# Patient Record
Sex: FEMALE | Race: WHITE | NOT HISPANIC OR LATINO | Employment: UNEMPLOYED | URBAN - METROPOLITAN AREA
[De-identification: names, ages, dates, MRNs, and addresses within clinical notes are randomized per-mention and may not be internally consistent; named-entity substitution may affect disease eponyms.]

---

## 2018-03-18 ENCOUNTER — APPOINTMENT (OUTPATIENT)
Dept: GENERAL RADIOLOGY | Facility: HOSPITAL | Age: 38
End: 2018-03-18

## 2018-03-18 PROCEDURE — 72050 X-RAY EXAM NECK SPINE 4/5VWS: CPT | Performed by: EMERGENCY MEDICINE

## 2018-05-31 ENCOUNTER — APPOINTMENT (OUTPATIENT)
Dept: OTHER | Facility: HOSPITAL | Age: 38
End: 2018-05-31

## 2018-05-31 ENCOUNTER — CONSULT (OUTPATIENT)
Dept: ONCOLOGY | Facility: CLINIC | Age: 38
End: 2018-05-31

## 2018-05-31 VITALS
DIASTOLIC BLOOD PRESSURE: 67 MMHG | WEIGHT: 164.2 LBS | RESPIRATION RATE: 16 BRPM | HEIGHT: 65 IN | BODY MASS INDEX: 27.36 KG/M2 | TEMPERATURE: 98.7 F | OXYGEN SATURATION: 100 % | HEART RATE: 76 BPM | SYSTOLIC BLOOD PRESSURE: 101 MMHG

## 2018-05-31 DIAGNOSIS — I63.9 CEREBROVASCULAR ACCIDENT (CVA), UNSPECIFIED MECHANISM (HCC): ICD-10-CM

## 2018-05-31 DIAGNOSIS — R21 SKIN MACULE OR MACULAR RASH: ICD-10-CM

## 2018-05-31 DIAGNOSIS — D64.9 ANEMIA, UNSPECIFIED TYPE: Primary | ICD-10-CM

## 2018-05-31 DIAGNOSIS — H53.8 BLURRY VISION, BILATERAL: ICD-10-CM

## 2018-05-31 DIAGNOSIS — D50.9 IRON DEFICIENCY ANEMIA, UNSPECIFIED IRON DEFICIENCY ANEMIA TYPE: Primary | ICD-10-CM

## 2018-05-31 DIAGNOSIS — E53.8 B12 DEFICIENCY: ICD-10-CM

## 2018-05-31 LAB
ALBUMIN SERPL-MCNC: 4.7 G/DL (ref 3.5–5.2)
ALBUMIN/GLOB SERPL: 1.4 G/DL
ALP SERPL-CCNC: 73 U/L (ref 39–117)
ALT SERPL W P-5'-P-CCNC: 13 U/L (ref 1–33)
ANION GAP SERPL CALCULATED.3IONS-SCNC: 12.3 MMOL/L
AST SERPL-CCNC: 17 U/L (ref 1–32)
BASOPHILS # BLD AUTO: 0.05 10*3/MM3 (ref 0–0.2)
BASOPHILS NFR BLD AUTO: 0.6 % (ref 0–1.5)
BILIRUB SERPL-MCNC: 0.8 MG/DL (ref 0.1–1.2)
BUN BLD-MCNC: 8 MG/DL (ref 6–20)
BUN/CREAT SERPL: 12.7 (ref 7–25)
CALCIUM SPEC-SCNC: 9.2 MG/DL (ref 8.6–10.5)
CHLORIDE SERPL-SCNC: 101 MMOL/L (ref 98–107)
CHROMATIN AB SERPL-ACNC: <10 IU/ML (ref 0–14)
CO2 SERPL-SCNC: 25.7 MMOL/L (ref 22–29)
CREAT BLD-MCNC: 0.63 MG/DL (ref 0.57–1)
DEPRECATED RDW RBC AUTO: 40.5 FL (ref 37–54)
EOSINOPHIL # BLD AUTO: 0.19 10*3/MM3 (ref 0–0.7)
EOSINOPHIL NFR BLD AUTO: 2.1 % (ref 0.3–6.2)
ERYTHROCYTE [DISTWIDTH] IN BLOOD BY AUTOMATED COUNT: 13.2 % (ref 11.7–13)
F5 GENE MUT ANL BLD/T: NORMAL
FACTOR II, DNA ANALYSIS: NORMAL
FERRITIN SERPL-MCNC: 12.5 NG/ML (ref 13–150)
FOLATE SERPL-MCNC: >20 NG/ML (ref 4.78–24.2)
GFR SERPL CREATININE-BSD FRML MDRD: 106 ML/MIN/1.73
GLOBULIN UR ELPH-MCNC: 3.4 GM/DL
GLUCOSE BLD-MCNC: 83 MG/DL (ref 65–99)
HCT VFR BLD AUTO: 36 % (ref 35.6–45.5)
HGB BLD-MCNC: 12 G/DL (ref 11.9–15.5)
IMM GRANULOCYTES # BLD: 0.02 10*3/MM3 (ref 0–0.03)
IMM GRANULOCYTES NFR BLD: 0.2 % (ref 0–0.5)
IRON 24H UR-MRATE: 56 MCG/DL (ref 37–145)
IRON SATN MFR SERPL: 12 % (ref 20–50)
LYMPHOCYTES # BLD AUTO: 2.85 10*3/MM3 (ref 0.9–4.8)
LYMPHOCYTES NFR BLD AUTO: 31.6 % (ref 19.6–45.3)
MCH RBC QN AUTO: 27.9 PG (ref 26.9–32)
MCHC RBC AUTO-ENTMCNC: 33.3 G/DL (ref 32.4–36.3)
MCV RBC AUTO: 83.7 FL (ref 80.5–98.2)
MONOCYTES # BLD AUTO: 0.56 10*3/MM3 (ref 0.2–1.2)
MONOCYTES NFR BLD AUTO: 6.2 % (ref 5–12)
NEUTROPHILS # BLD AUTO: 5.34 10*3/MM3 (ref 1.9–8.1)
NEUTROPHILS NFR BLD AUTO: 59.3 % (ref 42.7–76)
NRBC BLD MANUAL-RTO: 0 /100 WBC (ref 0–0)
PLATELET # BLD AUTO: 293 10*3/MM3 (ref 140–500)
PMV BLD AUTO: 9.5 FL (ref 6–12)
POTASSIUM BLD-SCNC: 3.7 MMOL/L (ref 3.5–5.2)
PROT SERPL-MCNC: 8.1 G/DL (ref 6–8.5)
RBC # BLD AUTO: 4.3 10*6/MM3 (ref 3.9–5.2)
SODIUM BLD-SCNC: 139 MMOL/L (ref 136–145)
TIBC SERPL-MCNC: 463 MCG/DL (ref 298–536)
TRANSFERRIN SERPL-MCNC: 311 MG/DL (ref 200–360)
VIT B12 BLD-MCNC: 255 PG/ML (ref 211–946)
WBC NRBC COR # BLD: 9.01 10*3/MM3 (ref 4.5–10.7)

## 2018-05-31 PROCEDURE — 86235 NUCLEAR ANTIGEN ANTIBODY: CPT | Performed by: INTERNAL MEDICINE

## 2018-05-31 PROCEDURE — 82728 ASSAY OF FERRITIN: CPT | Performed by: INTERNAL MEDICINE

## 2018-05-31 PROCEDURE — 99245 OFF/OP CONSLTJ NEW/EST HI 55: CPT | Performed by: INTERNAL MEDICINE

## 2018-05-31 PROCEDURE — 85303 CLOT INHIBIT PROT C ACTIVITY: CPT | Performed by: INTERNAL MEDICINE

## 2018-05-31 PROCEDURE — 86147 CARDIOLIPIN ANTIBODY EA IG: CPT | Performed by: INTERNAL MEDICINE

## 2018-05-31 PROCEDURE — 81241 F5 GENE: CPT | Performed by: INTERNAL MEDICINE

## 2018-05-31 PROCEDURE — 86038 ANTINUCLEAR ANTIBODIES: CPT | Performed by: INTERNAL MEDICINE

## 2018-05-31 PROCEDURE — 86431 RHEUMATOID FACTOR QUANT: CPT | Performed by: INTERNAL MEDICINE

## 2018-05-31 PROCEDURE — 82607 VITAMIN B-12: CPT | Performed by: INTERNAL MEDICINE

## 2018-05-31 PROCEDURE — 36415 COLL VENOUS BLD VENIPUNCTURE: CPT

## 2018-05-31 PROCEDURE — 85025 COMPLETE CBC W/AUTO DIFF WBC: CPT | Performed by: INTERNAL MEDICINE

## 2018-05-31 PROCEDURE — 86146 BETA-2 GLYCOPROTEIN ANTIBODY: CPT | Performed by: INTERNAL MEDICINE

## 2018-05-31 PROCEDURE — 84466 ASSAY OF TRANSFERRIN: CPT | Performed by: INTERNAL MEDICINE

## 2018-05-31 PROCEDURE — 85613 RUSSELL VIPER VENOM DILUTED: CPT | Performed by: INTERNAL MEDICINE

## 2018-05-31 PROCEDURE — 85300 ANTITHROMBIN III ACTIVITY: CPT | Performed by: INTERNAL MEDICINE

## 2018-05-31 PROCEDURE — 83540 ASSAY OF IRON: CPT | Performed by: INTERNAL MEDICINE

## 2018-05-31 PROCEDURE — 81240 F2 GENE: CPT | Performed by: INTERNAL MEDICINE

## 2018-05-31 PROCEDURE — 85306 CLOT INHIBIT PROT S FREE: CPT | Performed by: INTERNAL MEDICINE

## 2018-05-31 PROCEDURE — 82746 ASSAY OF FOLIC ACID SERUM: CPT | Performed by: INTERNAL MEDICINE

## 2018-05-31 PROCEDURE — 85732 THROMBOPLASTIN TIME PARTIAL: CPT | Performed by: INTERNAL MEDICINE

## 2018-05-31 PROCEDURE — 80053 COMPREHEN METABOLIC PANEL: CPT | Performed by: INTERNAL MEDICINE

## 2018-05-31 PROCEDURE — 86148 ANTI-PHOSPHOLIPID ANTIBODY: CPT | Performed by: INTERNAL MEDICINE

## 2018-05-31 PROCEDURE — 86225 DNA ANTIBODY NATIVE: CPT | Performed by: INTERNAL MEDICINE

## 2018-06-02 LAB
CARDIOLIPIN IGG SER IA-ACNC: <9 GPL U/ML (ref 0–14)
CARDIOLIPIN IGM SER IA-ACNC: 14 MPL U/ML (ref 0–12)

## 2018-06-03 LAB
AT III PPP CHRO-ACNC: 103 % (ref 90–134)
PROT C PPP-ACNC: 117 % (ref 86–163)
PROT S ACT/NOR PPP: 81 % (ref 70–127)
PROT S FREE PPP-ACNC: 90 % (ref 49–138)

## 2018-06-04 LAB
ANA SER QL: NEGATIVE
CENTROMERE B AB SER-ACNC: <0.2 AI (ref 0–0.9)
CHROMATIN AB SERPL-ACNC: 0.2 AI (ref 0–0.9)
DSDNA AB SER-ACNC: 2 IU/ML (ref 0–9)
ENA JO1 AB SER-ACNC: <0.2 AI (ref 0–0.9)
ENA RNP AB SER-ACNC: 0.3 AI (ref 0–0.9)
ENA SCL70 AB SER-ACNC: 0.5 AI (ref 0–0.9)
ENA SM AB SER-ACNC: <0.2 AI (ref 0–0.9)
ENA SS-A AB SER-ACNC: <0.2 AI (ref 0–0.9)
ENA SS-B AB SER-ACNC: <0.2 AI (ref 0–0.9)
Lab: NORMAL

## 2018-06-04 NOTE — PROGRESS NOTES
.     REASON FOR CONSULTATION:     Provide an opinion on any further workup or treatment of anemia and possible blood clots                             REQUESTING PHYSICIAN: Marii Bravo MD     RECORDS OBTAINED:  Records of the patients history including those obtained from the referring provider were reviewed and summarized in detail.    HISTORY OF PRESENT ILLNESS:  The patient is a 38 y.o. year old female  who is here for follow-up with the above-mentioned history.    Patient presented for evaluation reported by her primary care physician Dr. Barvo because of recurrent blurry visions.  Patient also has abnormal brain MRI examination on multiple occasions.  She was origin of diagnosis of multiple sclerosis, however was told by urologist Dr. Khanna that she did not have multiple sclerosis.  Most recently patient was seen by a different neurologist Dr. Scott that her MRI images did not fit with typical MS changes.  This patient has chronic migraine headache, and was taking Topamax low-dose with good results but with side effects and the medicine was recently changed to Trokendl XR.     This patient also has history of iron deficiency for which she has been taking oral iron for many years.  She also had a vitamin B12 deficiency.  Her laboratory study on 11/20/2017 clearly reported iron deficiency with a ferritin 8 ng/mL, and that she was already mildly anemic with hemoglobin 11.3, and MCV 82.9. She had a normal WBC 6700 including neutrophils 3850 and lymphocytes 2190, monocytes 420.  Her platelets was 323,000.     Laboratory study on 3/23/2018 reported hemoglobin 11.6, MCV 83.1 MCHC 33.6.  Her WBC was 7900 including neutrophils 5240, lymphocytes 1980, monocytes 500.  Her platelets were 341,000.  Her iron studies reported free iron 106 TIBC 365 and iron saturation 29%, vitamin B12 at 242 pg/mL and vitamin D 20 ng/mL.  There was no ferritin level nor folic acid level.          Past Medical History:    Diagnosis Date   • Anemia     B12 anemia during pregnancy in    • Blurry vision, bilateral    • Cervical cancer 1965    Stage 0    • H/O HPV in female    • H/O Incomplete RBBB 2013   • H/O Raynaud phenomenon     Probable at age 31 during pregnancy   • History of prior pregnancies     x3   • History of syncope    • MS (multiple sclerosis)    • POTS (postural orthostatic tachycardia syndrome)    • Stroke     Blood clot, age 26   • SVT (supraventricular tachycardia)      Past Surgical History:   Procedure Laterality Date   • CERVICAL BIOPSY  W/ LOOP ELECTRODE EXCISION       OB/GYN history.  Menarche age 17.  .  No use of birth control pill use.  No IUD.     HEMATOLOGIC/ONCOLOGIC HISTORY:  (History from previous dates can be found in the separate document.)  See history of present illness.      MEDICATIONS    Current Outpatient Prescriptions:   •  BABY ASPIRIN PO, Take  by mouth., Disp: , Rfl:   •  CRANBERRY PO, Take  by mouth., Disp: , Rfl:   •  IRON PO, Take  by mouth., Disp: , Rfl:     ALLERGIES:     Allergies   Allergen Reactions   • Codeine Rash     vomiting       SOCIAL HISTORY:       Social History     Social History   • Marital status:      Spouse name: Bebo   • Number of children: 3   • Years of education: College      Occupational History   • , Artist       Social History Main Topics   • Smoking status: Never Smoker   • Smokeless tobacco: Never Used   • Alcohol use Yes      Comment: wine Usually in holidays and bodies, less than 1 drink every 2 weeks    • Drug use: No    • Sexual activity: Not on file         FAMILY HISTORY:  Family History   Problem Relation Age of Onset   • Cancer Mother Diagnosed of uterine cancer at age 38 post surgery.  Currently age 64 in 2018 no details.      • Other Mother, Also has chronic anemia.  Gallbladder stone.           Heart murmur   • ADD / ADHD Brother    Family history of strokes in grandmother also had Alzheimer's disease.   Gallbladder  "stone in mother, grandmother and 3 aunts.     REVIEW OF SYSTEMS:  Review of Systems   Constitutional: Negative for chills, fatigue and fever.   HENT: Positive for sore throat. Negative for congestion, facial swelling and rhinorrhea.    Eyes: Positive for visual disturbance.   Respiratory: Positive for cough and shortness of breath.    Cardiovascular: Positive for chest pain, palpitations and leg swelling.   Gastrointestinal: Negative for abdominal pain, blood in stool, diarrhea and nausea.   Endocrine: Negative for polyuria.   Genitourinary: Negative for dysuria, frequency and hematuria.   Musculoskeletal: Positive for back pain. Negative for arthralgias and joint swelling.   Allergic/Immunologic: Negative for immunocompromised state.   Neurological: Positive for dizziness, numbness and headaches. Negative for syncope and speech difficulty.   Hematological: Negative for adenopathy. Does not bruise/bleed easily.   Psychiatric/Behavioral: Negative for agitation and confusion.              Vitals:    05/31/18 1349   BP: 101/67   Pulse: 76   Resp: 16   Temp: 98.7 °F (37.1 °C)   TempSrc: Oral   SpO2: 100%   Weight: 74.5 kg (164 lb 3.2 oz)   Height: 164.5 cm (64.76\")  Comment: new ht   PainSc: 0-No pain     Current Status 5/31/2018   ECOG score 0      PHYSICAL EXAM:      CONSTITUTIONAL:  Well-developed and well-nourished young female.  No distress, looks comfortable.  EYES:  Conjunctiva and lids unremarkable.  PERRLA  EARS,NOSE,MOUTH,THROAT:  Ears and nose appear unremarkable.  Lips, teeth, gums appear unremarkable.  RESPIRATORY:  Normal respiratory effort.  Lungs clear to auscultation bilaterally.  CARDIOVASCULAR:  Normal S1, S2.  No murmurs rubs or gallops.  GASTROINTESTINAL: Abdomen appears unremarkable.  Nontender.  No hepatomegaly.  No splenomegaly.   LYMPHATIC:  No cervical, supraclavicular, axillary lymphadenopathy.  MUSCULOSKELETAL:  No significant lower extremity edema.   Unremarkable gait.  No cyanosis or " clubbing.  SKIN:  Warm.  Patient has malar rashes on her face  PSYCHIATRIC:  Normal judgment and insight.  Normal mood and affect.      RECENT LABS:    Lab Results   Component Value Date    WBC 9.01 05/31/2018    HGB 12.0 05/31/2018    HCT 36.0 05/31/2018    MCV 83.7 05/31/2018     05/31/2018     Lab Results   Component Value Date    NEUTROABS 5.34 05/31/2018           Assessment/Plan      1.  Blurry vision, bilateral, ? Cerebrovascular accident (CVA), multiple sclerosis?  This patient has multiple major complaints, and was thought related to multiple sclerosis, however she was told by 2 different neurologists that the MRI changes did not not fit with multiple sclerosis.  Instead it may be more of stroke.  This patient also has intermittent lower extremity edema although no previous evidence of venous thrombosis.  I do agree this patient needs workup for hypercoagulable status for further evaluation.      - Factor V Leiden mutation  - Factor II, DNA Analysis  - Protein C Activity  - Protein S Functional  - Anticardiolipin Antibody, IgG / M, Qn  - Lupus Anticoagulant Reflex  - Beta-2 Glycoprotein Antibodies  - Antithrombin III  - Protein S Antigen, Free  - Phosphatidylserine Antibodies  - Comprehensive Metabolic Panel      2.  Iron deficiency anemia, unspecified iron deficiency anemia type.  Patient reports she is being on oral iron treatment for several years and has been compliant.  However she has persistent iron deficiency.  I recommended checking her iron studies again.  This patient may need intravenous iron therapy as she likely has poor oral iron absorption.   - Ferritin  - Iron Profile  - Comprehensive Metabolic Panel    3.  B12 deficiency.  This patient has history of B12 deficiency and the was given B12 injection previously.  However she has not received a B12 in the past couple of years.  She was recently instructed to start taking oral vitamin B12, however she only took it irregularly.  We'll  check a B12 level together with folic acid level.  - Vitamin B12  - Folate    4.  Facial skin malar rash.  This is a very faint and the patient is not well aware of this.  She denies pruritus.  Suspect patient may have autoimmune disease.  We'll check laboratory study for evaluation.  - Antinuclear Antibodies Direct  - WANG Comprehensive Panel  - Rheumatoid Factor, Quant      PLAN:   1.  Laboratory study as mentioned above.  2.  Patient returns in 2 weeks for reevaluation.      More than 80 min was spent for patient to care, over half of that time were for counseling.    CRISTIAN YANG M.D., Ph.D.    5/31/2018      CC: Marii Bravo MD        Dictated using Dragon Dictation.

## 2018-06-05 LAB
B2 GLYCOPROT1 IGA SER-ACNC: <9 GPI IGA UNITS (ref 0–25)
B2 GLYCOPROT1 IGG SER-ACNC: <9 GPI IGG UNITS (ref 0–20)
B2 GLYCOPROT1 IGM SER-ACNC: <9 GPI IGM UNITS (ref 0–32)
PS IGA SER-ACNC: 2 APS IGA (ref 0–20)
PS IGG SER-ACNC: 2 GPS IGG (ref 0–11)
PS IGM SER-ACNC: 57 MPS IGM (ref 0–25)

## 2018-06-06 LAB
LA NT PLATELET PPP: 45.9 SEC (ref 0–51.9)
LUPUS ANTICOAGULANT REFLEX: NORMAL
SCREEN DRVVT: 45.8 SEC (ref 0–47)

## 2018-06-14 ENCOUNTER — APPOINTMENT (OUTPATIENT)
Dept: OTHER | Facility: HOSPITAL | Age: 38
End: 2018-06-14

## 2018-06-14 ENCOUNTER — OFFICE VISIT (OUTPATIENT)
Dept: ONCOLOGY | Facility: CLINIC | Age: 38
End: 2018-06-14

## 2018-06-14 VITALS
DIASTOLIC BLOOD PRESSURE: 69 MMHG | TEMPERATURE: 99.5 F | RESPIRATION RATE: 16 BRPM | SYSTOLIC BLOOD PRESSURE: 104 MMHG | HEART RATE: 90 BPM | BODY MASS INDEX: 27.19 KG/M2 | WEIGHT: 163.2 LBS | HEIGHT: 65 IN | OXYGEN SATURATION: 99 %

## 2018-06-14 DIAGNOSIS — H53.8 BLURRY VISION, BILATERAL: ICD-10-CM

## 2018-06-14 DIAGNOSIS — D68.59 HYPERCOAGULATION SYNDROME (HCC): Primary | ICD-10-CM

## 2018-06-14 DIAGNOSIS — I63.9 CEREBROVASCULAR ACCIDENT (CVA), UNSPECIFIED MECHANISM (HCC): ICD-10-CM

## 2018-06-14 DIAGNOSIS — D50.9 IRON DEFICIENCY ANEMIA, UNSPECIFIED IRON DEFICIENCY ANEMIA TYPE: ICD-10-CM

## 2018-06-14 DIAGNOSIS — E53.8 B12 DEFICIENCY: ICD-10-CM

## 2018-06-14 DIAGNOSIS — R21 SKIN MACULE OR MACULAR RASH: ICD-10-CM

## 2018-06-14 DIAGNOSIS — E53.8 B12 DEFICIENCY: Primary | ICD-10-CM

## 2018-06-14 LAB
BASOPHILS # BLD AUTO: 0.03 10*3/MM3 (ref 0–0.2)
BASOPHILS NFR BLD AUTO: 0.4 % (ref 0–1.5)
DEPRECATED RDW RBC AUTO: 39.5 FL (ref 37–54)
EOSINOPHIL # BLD AUTO: 0.15 10*3/MM3 (ref 0–0.7)
EOSINOPHIL NFR BLD AUTO: 1.9 % (ref 0.3–6.2)
ERYTHROCYTE [DISTWIDTH] IN BLOOD BY AUTOMATED COUNT: 13.2 % (ref 11.7–13)
HCT VFR BLD AUTO: 35.2 % (ref 35.6–45.5)
HGB BLD-MCNC: 11.9 G/DL (ref 11.9–15.5)
IMM GRANULOCYTES # BLD: 0.03 10*3/MM3 (ref 0–0.03)
IMM GRANULOCYTES NFR BLD: 0.4 % (ref 0–0.5)
LYMPHOCYTES # BLD AUTO: 2.42 10*3/MM3 (ref 0.9–4.8)
LYMPHOCYTES NFR BLD AUTO: 30.6 % (ref 19.6–45.3)
MCH RBC QN AUTO: 28.2 PG (ref 26.9–32)
MCHC RBC AUTO-ENTMCNC: 33.8 G/DL (ref 32.4–36.3)
MCV RBC AUTO: 83.4 FL (ref 80.5–98.2)
MONOCYTES # BLD AUTO: 0.44 10*3/MM3 (ref 0.2–1.2)
MONOCYTES NFR BLD AUTO: 5.6 % (ref 5–12)
NEUTROPHILS # BLD AUTO: 4.84 10*3/MM3 (ref 1.9–8.1)
NEUTROPHILS NFR BLD AUTO: 61.1 % (ref 42.7–76)
NRBC BLD MANUAL-RTO: 0 /100 WBC (ref 0–0)
PLATELET # BLD AUTO: 297 10*3/MM3 (ref 140–500)
PMV BLD AUTO: 9.3 FL (ref 6–12)
RBC # BLD AUTO: 4.22 10*6/MM3 (ref 3.9–5.2)
WBC NRBC COR # BLD: 7.91 10*3/MM3 (ref 4.5–10.7)

## 2018-06-14 PROCEDURE — 36415 COLL VENOUS BLD VENIPUNCTURE: CPT

## 2018-06-14 PROCEDURE — 99215 OFFICE O/P EST HI 40 MIN: CPT | Performed by: INTERNAL MEDICINE

## 2018-06-14 PROCEDURE — 85025 COMPLETE CBC W/AUTO DIFF WBC: CPT | Performed by: INTERNAL MEDICINE

## 2018-06-14 NOTE — PROGRESS NOTES
.     REASON FOR CONSULTATION:     1.  Provide an opinion on any further workup or treatment of anemia and possible blood clots with suspected episodes of strokes.   2.  Laboratory study on 5/31/2018 reported significantly elevated anti-phosphatidylserine IgM 57, and marginally elevated anticardiolipin IgM 14.  All others were negative.   3.  Laboratory tests confirmed vitamin B12 deficiency and iron deficiency on 5/31/2018.                                   HISTORY OF PRESENT ILLNESS:  The patient is a 38 y.o. year old female who is here for follow-up with the above-mentioned history.    The patient presented today for reevaluation and to discuss laboratory results obtained on 05/31/2018 when I saw her for the 1st time for evaluation of possible thrombosis as well as persistent anemia.  The patient reports no changes in her clinical condition the past 6 days.  No recent episodes of blurry vision.  She is on low-dose aspirin 81 mg daily.  The patient also has been on low-dose oral iron 28 mg daily with good compliance.  She was not able to tolerate the large dose of ferrous sulfate at 65 mg tablets because of significant constipation.  The patient is not really compliant with oral vitamin B12 supplementation.    Laboratory studies we obtained on 05/31/2018 showed significantly elevated antiphosphatidylserine antibody IgM subtype at 57, but negative for IgG and IgA subtype.  She is also marginally positive for anticardiolipin IgM at 14, but negative for IgG subtype.  She was tested negative for other hypercoagulable tests including anti-Beta-2 glycoprotein 1 antibodies, negative for lupus anticoagulant and normal antithrombin 103%, normal protein C activity 117%, normal protein S activity 81% with free protein S antigen 90%.  She is also negative for factor II mutation and factor V Leiden mutation.  We also checked her for comprehensive rheumatoid panel which is all negative as well as negative for the rheumatoid  factor and direct WANG.  She also had iron deficiency, ferritin 12.5, iron saturation 12% 2018.         Past Medical History:   Diagnosis Date   • Anemia     B12 anemia during pregnancy in    • Blurry vision, bilateral    • Cervical cancer 1965    Stage 0    • H/O HPV in female    • H/O Incomplete RBBB 2013   • H/O Raynaud phenomenon     Probable at age 31 during pregnancy   • History of prior pregnancies     x3   • History of syncope    • MS (multiple sclerosis)    • POTS (postural orthostatic tachycardia syndrome)    • Stroke     Blood clot, age 26   • SVT (supraventricular tachycardia)    • Tattoos      Past Surgical History:   Procedure Laterality Date   • CERVICAL BIOPSY  W/ LOOP ELECTRODE EXCISION       OB/GYN history.  Menarche age 17.  .  No use of birth control pill use.  No IUD.     HEMATOLOGIC/ONCOLOGIC HISTORY:  The patient is a 38 y.o. year old female who is here for initial evaluation with the above-mentioned history on 2018.    Patient presented for evaluation reported by her primary care physician Dr. Bravo because of recurrent episodes of blurry visions.  Patient also has abnormal brain MRI examination on multiple occasions.  She was origin of diagnosis of multiple sclerosis, however was told by urologist Dr. Khanna that she did not have multiple sclerosis.  Most recently patient was seen by a different neurologist Dr. Scott that her MRI images did not fit with typical MS changes, instead it may be changes related to thrombotic stroke.  This patient has chronic migraine headache, and was taking Topamax low-dose with good results but with side effects and the medicine was recently changed to Trokendl XR.     This patient also has history of iron deficiency for which she has been taking oral iron for many years.  She also had vitamin B12 deficiency.  Her laboratory study on 2017 clearly reported iron deficiency with ferritin 8 ng/mL, and she was already mildly anemic  with hemoglobin 11.3, and MCV 82.9. She had a normal WBC 6700 including neutrophils 3850 and lymphocytes 2190, monocytes 420.  Her platelets was 323,000.     Laboratory study on 3/23/2018 reported hemoglobin 11.6, MCV 83.1 MCHC 33.6.  Her WBC was 7900 including neutrophils 5240, lymphocytes 1980, monocytes 500.  Her platelets were 341,000.  Her iron studies reported free iron 106 TIBC 365 and iron saturation 29%, vitamin B12 at 242 pg/mL and vitamin D 20 ng/mL.  There was no ferritin level nor folic acid level.          MEDICATIONS    Current Outpatient Prescriptions:   •  BABY ASPIRIN PO, Take  by mouth., Disp: , Rfl:   •  CRANBERRY PO, Take  by mouth., Disp: , Rfl:   •  IRON PO, Take  by mouth., Disp: , Rfl:     ALLERGIES:     Allergies   Allergen Reactions   • Codeine Rash     vomiting       SOCIAL HISTORY:       Social History     Social History   • Marital status:      Spouse name: Bebo   • Number of children: 3   • Years of education: College      Occupational History   • , Artist       Social History Main Topics   • Smoking status: Never Smoker   • Smokeless tobacco: Never Used   • Alcohol use Yes      Comment: wine Usually in holidays and bodies, less than 1 drink every 2 weeks    • Drug use: No    • Sexual activity: Not on file         FAMILY HISTORY:  Family History   Problem Relation Age of Onset   • Cancer Mother Diagnosed of uterine cancer at age 38 post surgery.  Currently age 64 in 2018 no details.      • Other Mother, Also has chronic anemia.  Gallbladder stone.           Heart murmur   • ADD / ADHD Brother    Family history of strokes in grandmother also had Alzheimer's disease.   Gallbladder stone in mother, grandmother and 3 aunts.     REVIEW OF SYSTEMS:  Review of Systems   Constitutional: Negative for chills, fatigue and fever.   HENT: Positive for sore throat. Negative for congestion, facial swelling and rhinorrhea.    Eyes: Positive for visual disturbance.   Respiratory:  "Positive for cough and shortness of breath.    Cardiovascular: Positive for chest pain, palpitations and leg swelling.   Gastrointestinal: Negative for abdominal pain, blood in stool, diarrhea and nausea.   Endocrine: Negative for polyuria.   Genitourinary: Negative for dysuria, frequency and hematuria.   Musculoskeletal: Positive for back pain. Negative for arthralgias and joint swelling.   Allergic/Immunologic: Negative for immunocompromised state.   Neurological: Positive for dizziness, numbness and headaches. Negative for syncope and speech difficulty.   Hematological: Negative for adenopathy. Does not bruise/bleed easily.   Psychiatric/Behavioral: Negative for agitation and confusion.              Vitals:    06/14/18 0930   BP: 104/69   Pulse: 90   Resp: 16   Temp: 99.5 °F (37.5 °C)   TempSrc: Oral   SpO2: 99%   Weight: 74 kg (163 lb 3.2 oz)   Height: 164.5 cm (64.76\")   PainSc: 0-No pain     Current Status 6/14/2018   ECOG score 0      PHYSICAL EXAM:      CONSTITUTIONAL:  Well-developed and well-nourished young female.  No distress, looks comfortable.  EYES:  Conjunctiva and lids unremarkable.  PERRLA  EARS,NOSE,MOUTH,THROAT:  Ears and nose appear unremarkable.  Lips, teeth, gums appear unremarkable.  RESPIRATORY:  Normal respiratory effort.  Lungs clear to auscultation bilaterally.  CARDIOVASCULAR:  Normal S1, S2.  No murmurs rubs or gallops.  GASTROINTESTINAL: Abdomen appears unremarkable.  Nontender.  No hepatomegaly.  No splenomegaly.   LYMPHATIC:  No cervical, supraclavicular, axillary lymphadenopathy.  MUSCULOSKELETAL:  No significant lower extremity edema.   Unremarkable gait.  No cyanosis or clubbing.  SKIN:  Warm.  Patient has malar rashes on her face  PSYCHIATRIC:  Normal judgment and insight.  Normal mood and affect.      RECENT LABS:    Lab Results   Component Value Date    WBC 7.91 06/14/2018    HGB 11.9 06/14/2018    HCT 35.2 (L) 06/14/2018    MCV 83.4 06/14/2018     06/14/2018     Lab " Results   Component Value Date    NEUTROABS 4.84 06/14/2018     Lab Results   Component Value Date    WCIZYBTM79 255 05/31/2018     Lab Results   Component Value Date    FOLATE >20.00 05/31/2018     Lab Results   Component Value Date    IRON 56 05/31/2018    TIBC 463 05/31/2018    FERRITIN 12.50 (L) 05/31/2018   Iron saturation 12%      Assessment/Plan      1.  Blurry vision, bilateral, ? Cerebrovascular accident (CVA), multiple sclerosis?  This patient has multiple major complaints, and was thought related to multiple sclerosis, however she was told by 2 different neurologists that the MRI changes did not not fit with multiple sclerosis.  Instead it may be more of stroke.  This patient also has intermittent lower extremity edema although no previous evidence of venous thrombosis.     I discussed with the patient, with her clinical symptomology with multiple episodes of blurred vision and MRI examination showed possible effect from previous strokes together with abnormal laboratory tests with significantly elevated antiphosphatidylserine antibody IGM, she is highly suspicious for antiphospholipid syndrome.  I did point out that this abnormality needs to be retested in 3 months per recommendation, only if she is persistently positive for the antibody, she will meet criteria for diagnosis.  Nevertheless, based on her symptomology I would start her on anticoagulation.  I discussed with her the options and I recommended starting Eliquis 5 mg twice a day without a loading dose since she does not have acute episodes of stroke.  I did point out that if in the future she becomes pregnant, she would notify us as soon as possible and we will switch her to Lovenox since Eliquis and other new generation oral anticoagulant medication has no safety records in pregnant women.  She voiced understanding.      2.  Iron deficiency anemia.      This patient has been on low-dose oral iron once a day for a few years with good compliance.   She could not tolerate high-dose ferrous sulfate because of significant constipation.  She does have persistent iron deficiency and clearly shown by laboratory study on 05/31/2018 with ferritin 12.5, iron saturation 12%.  I asked her to increase the oral iron to 3 times a day.  She could use a stool softener with Senokot 2 tablets twice a day and on top of that could still use MiraLAX or other such as fiber and she voiced understanding.  If she is not able to tolerate the increased dose of oral iron treatment, we will arrange for the patient to have intravenous iron therapy.  I did point out there are small incidents of allergic reaction associated with intravenous iron therapy.     , unspecified iron deficiency anemia type.  Patient reports she is being on oral iron treatment for several years and has been compliant.  However she has persistent iron deficiency.  I recommended checking her iron studies again.  This patient may need intravenous iron therapy as she likely has poor oral iron absorption.   - Ferritin  - Iron Profile  - Comprehensive Metabolic Panel    3.  B12 deficiency.  This patient has history of B12 deficiency and the was given B12 injection previously.  However she has not received a B12 in the past couple of years.     This patient has persistent vitamin B12 deficiency and she is not taking B12 daily.  I discussed with her that we will try avoiding using vitamin B12 intramuscular injection because of the risk of intramuscular hematoma as we will start her on anticoagulation medicine.  I would prefer she can be persistent in taking oral vitamin B12 daily at 1000 mcg and we will monitor her labs.  Unless she is not responding to oral vitamin B12 then we can potentially switch her to subcutaneous B12 injection, which is not the best way to give her vitamin B12.  The patient is agreeable to taking vitamin B12 more religiously.      4.  Facial skin malar rash.  This is very faint facial skin rash.   She denies pruritus.  She has completely negative laboratory studies for Lupus disease.  We'll monitor this.  If she has worsening skin rash,  She will need dermatology evaluation.    PLAN:      1.  Start Eliquis 5 mg twice a day.  She could continue to take low-dose aspirin.  She needs to be cautious for injury or bleeding.   2.  Increase oral iron to 3 times a day.  Use stool softener judiciously.  3.  Take vitamin B12 oral 1000 mcg daily.   4.  I will bring the patient back for reevaluation in 3 months with laboratory study 1 week earlier including repeating antiphosphatidylserine antibodies, anticardiolipin antibodies, ferritin, iron profile, vitamin B12 level and CBC.      More than 45 min was spent for patient to care, over half of that time were for counseling.    CRISTIAN YANG M.D., Ph.D.    6/14/2018      CC: Marii Bravo MD        Dictated using Dragon Dictation.

## 2018-08-15 ENCOUNTER — APPOINTMENT (OUTPATIENT)
Dept: CT IMAGING | Facility: HOSPITAL | Age: 38
End: 2018-08-15

## 2018-08-15 ENCOUNTER — HOSPITAL ENCOUNTER (EMERGENCY)
Facility: HOSPITAL | Age: 38
Discharge: HOME | End: 2018-08-15
Attending: EMERGENCY MEDICINE | Admitting: EMERGENCY MEDICINE

## 2018-08-15 VITALS
BODY MASS INDEX: 27.31 KG/M2 | WEIGHT: 160 LBS | RESPIRATION RATE: 16 BRPM | OXYGEN SATURATION: 98 % | DIASTOLIC BLOOD PRESSURE: 60 MMHG | HEIGHT: 64 IN | TEMPERATURE: 98.2 F | SYSTOLIC BLOOD PRESSURE: 100 MMHG | HEART RATE: 72 BPM

## 2018-08-15 DIAGNOSIS — R51.9 NONINTRACTABLE HEADACHE, UNSPECIFIED CHRONICITY PATTERN, UNSPECIFIED HEADACHE TYPE: Primary | ICD-10-CM

## 2018-08-15 PROCEDURE — 99284 EMERGENCY DEPT VISIT MOD MDM: CPT

## 2018-08-15 PROCEDURE — 25010000002 ONDANSETRON PER 1 MG

## 2018-08-15 PROCEDURE — 99282 EMERGENCY DEPT VISIT SF MDM: CPT | Performed by: EMERGENCY MEDICINE

## 2018-08-15 PROCEDURE — 25010000002 METHYLPREDNISOLONE PER 40 MG: Performed by: EMERGENCY MEDICINE

## 2018-08-15 PROCEDURE — 70450 CT HEAD/BRAIN W/O DYE: CPT

## 2018-08-15 PROCEDURE — 96374 THER/PROPH/DIAG INJ IV PUSH: CPT

## 2018-08-15 PROCEDURE — 96375 TX/PRO/DX INJ NEW DRUG ADDON: CPT

## 2018-08-15 RX ORDER — ONDANSETRON 2 MG/ML
INJECTION INTRAMUSCULAR; INTRAVENOUS
Status: COMPLETED
Start: 2018-08-15 | End: 2018-08-15

## 2018-08-15 RX ORDER — ACETAMINOPHEN 500 MG
1000 TABLET ORAL ONCE
Status: COMPLETED | OUTPATIENT
Start: 2018-08-15 | End: 2018-08-15

## 2018-08-15 RX ORDER — METHYLPREDNISOLONE SODIUM SUCCINATE 40 MG/ML
80 INJECTION, POWDER, LYOPHILIZED, FOR SOLUTION INTRAMUSCULAR; INTRAVENOUS ONCE
Status: COMPLETED | OUTPATIENT
Start: 2018-08-15 | End: 2018-08-15

## 2018-08-15 RX ORDER — ONDANSETRON 2 MG/ML
4 INJECTION INTRAMUSCULAR; INTRAVENOUS ONCE
Status: COMPLETED | OUTPATIENT
Start: 2018-08-15 | End: 2018-08-15

## 2018-08-15 RX ORDER — SODIUM CHLORIDE 0.9 % (FLUSH) 0.9 %
10 SYRINGE (ML) INJECTION AS NEEDED
Status: DISCONTINUED | OUTPATIENT
Start: 2018-08-15 | End: 2018-08-15 | Stop reason: HOSPADM

## 2018-08-15 RX ADMIN — METHYLPREDNISOLONE SODIUM SUCCINATE 80 MG: 40 INJECTION, POWDER, FOR SOLUTION INTRAMUSCULAR; INTRAVENOUS at 04:04

## 2018-08-15 RX ADMIN — ONDANSETRON 4 MG: 2 INJECTION INTRAMUSCULAR; INTRAVENOUS at 04:04

## 2018-08-15 RX ADMIN — ACETAMINOPHEN 1000 MG: 500 TABLET, FILM COATED ORAL at 04:06

## 2018-08-15 NOTE — ED PROVIDER NOTES
Subjective   History of Present Illness  History of Present Illness    Chief complaint: Head pressure and blurry vision    Location: Crown of head    Quality/Severity:  Moderate    Timing/Duration: Sudden onset  approximately 8 hours prior to arrival    Modifying Factors: Supine position increases discomfort    Associated Symptoms: Neck stiffness      Narrative: The patient is a 38-year-old white female who presents as noted above.  The patient states that she was at rest when she had the sudden onset of severe head pressure associated with some nausea, neck stiffness and blurred vision.  The patient relates that she had a similar episode in March of this year with no specific diagnosis given although the record indicates the episode was possibly due to multiple sclerosis or possibly an ischemic event.    Review of Systems   Constitutional: Negative for activity change, appetite change, fatigue and fever.   HENT: Negative for congestion.    Eyes: Positive for visual disturbance.   Respiratory: Negative for cough, shortness of breath and wheezing.    Cardiovascular: Negative for chest pain, palpitations and leg swelling.   Gastrointestinal: Positive for nausea. Negative for abdominal pain, diarrhea and vomiting.   Endocrine: Negative for polydipsia.   Genitourinary: Negative for difficulty urinating, dysuria, flank pain, frequency and urgency.   Musculoskeletal: Positive for neck stiffness. Negative for back pain.   Skin: Negative for rash.   Neurological: Positive for headaches. Negative for dizziness and weakness.   Psychiatric/Behavioral: Negative for confusion.   All other systems reviewed and are negative.      Past Medical History:   Diagnosis Date   • Anemia     B12 anemia during pregnancy in 2014   • Antiphospholipid syndrome (CMS/HCC)    • Blurry vision, bilateral    • Cervical cancer (CMS/HCC) 1965    Stage 0    • H/O HPV in female    • H/O Incomplete RBBB 09/2013   • H/O Raynaud phenomenon     Probable at  age 31 during pregnancy   • History of prior pregnancies     x3   • History of syncope    • MS (multiple sclerosis) (CMS/HCC)    • POTS (postural orthostatic tachycardia syndrome) 2014   • Stroke (CMS/HCC)     Blood clot, age 26   • SVT (supraventricular tachycardia) (CMS/HCC)    • Tattoos        Allergies   Allergen Reactions   • Codeine Rash     vomiting       Past Surgical History:   Procedure Laterality Date   • CERVICAL BIOPSY  W/ LOOP ELECTRODE EXCISION         Family History   Problem Relation Age of Onset   • Other Mother         Heart murmur   • Uterine cancer Mother 38   • ADD / ADHD Brother    • Stroke Other    • Alzheimer's disease Other        Social History     Social History   • Marital status:      Spouse name: Bebo   • Number of children: 3   • Years of education: College     Occupational History   •       Social History Main Topics   • Smoking status: Never Smoker   • Smokeless tobacco: Never Used   • Alcohol use Yes      Comment: wine   • Drug use: No     Other Topics Concern   • Not on file           Objective   Physical Exam   Constitutional: She is oriented to person, place, and time. She appears well-developed and well-nourished.   HENT:   Head: Normocephalic and atraumatic.   Eyes: Pupils are equal, round, and reactive to light. Conjunctivae and EOM are normal.   Neck: No thyromegaly present.   Patient expresses discomfort with flexion of neck.   Cardiovascular: Normal rate, regular rhythm and normal heart sounds.    No murmur heard.  Pulmonary/Chest: Effort normal and breath sounds normal. No respiratory distress. She has no wheezes. She has no rales.   Abdominal: Soft. Bowel sounds are normal. She exhibits no distension. There is no tenderness.   Musculoskeletal: Normal range of motion. She exhibits no edema or tenderness.   Lymphadenopathy:     She has no cervical adenopathy.   Neurological: She is alert and oriented to person, place, and time. No cranial nerve deficit.    Skin: Skin is warm and dry. No rash noted.   Psychiatric: She has a normal mood and affect. Her behavior is normal. Thought content normal.   Nursing note and vitals reviewed.      Procedures           ED Course  ED Course as of Aug 15 0518   Wed Aug 15, 2018   0406 A thorough review of the patient's medical record was undertaken.  The patient did have CTA and MRI of the brain in April of this year.  Although the scans were not completely normal they did not definitively show an exact diagnosis.  The patient was recently seen by oncology and started on eliquis for possible antiphospholipid syndrome.  Due to the fact that the patient is anticoagulated and is complaining of some neck stiffness along with her head pressure there is a possibility that she may have suffered a subarachnoid hemorrhage.  [ML]   0505 Radiology wet read on the head CT showed no acute findings.  [ML]   0514 Negative CT results explained to patient.  She was reassured that there was no evidence of acute bleeding are a major ischemic infarct at this time.  Patient stated that she did feel slightly better after treatment in the emergency department.  Patient was strongly advised to follow-up with her primary care provider and warnings were discussed.  [ML]      ED Course User Index  [ML] Luis Tomas MD                  MDM  Number of Diagnoses or Management Options  Nonintractable headache, unspecified chronicity pattern, unspecified headache type: new and requires workup     Amount and/or Complexity of Data Reviewed  Tests in the radiology section of CPT®: ordered and reviewed  Review and summarize past medical records: yes  Independent visualization of images, tracings, or specimens: yes    Risk of Complications, Morbidity, and/or Mortality  Presenting problems: high  Diagnostic procedures: high  Management options: moderate    Patient Progress  Patient progress: stable        Final diagnoses:   Nonintractable headache, unspecified  chronicity pattern, unspecified headache type            Luis Tomas MD  08/15/18 0522

## 2018-08-31 ENCOUNTER — APPOINTMENT (OUTPATIENT)
Dept: OTHER | Facility: HOSPITAL | Age: 38
End: 2018-08-31

## 2018-09-04 ENCOUNTER — TELEPHONE (OUTPATIENT)
Dept: ONCOLOGY | Facility: CLINIC | Age: 38
End: 2018-09-04

## 2018-09-06 ENCOUNTER — APPOINTMENT (OUTPATIENT)
Dept: OTHER | Facility: HOSPITAL | Age: 38
End: 2018-09-06

## 2018-09-06 ENCOUNTER — APPOINTMENT (OUTPATIENT)
Dept: ONCOLOGY | Facility: CLINIC | Age: 38
End: 2018-09-06

## 2018-09-13 ENCOUNTER — LAB (OUTPATIENT)
Dept: OTHER | Facility: HOSPITAL | Age: 38
End: 2018-09-13

## 2018-09-13 DIAGNOSIS — D50.9 IRON DEFICIENCY ANEMIA, UNSPECIFIED IRON DEFICIENCY ANEMIA TYPE: ICD-10-CM

## 2018-09-13 DIAGNOSIS — D68.59 HYPERCOAGULATION SYNDROME (HCC): ICD-10-CM

## 2018-09-13 DIAGNOSIS — E53.8 B12 DEFICIENCY: ICD-10-CM

## 2018-09-13 LAB
BASOPHILS # BLD AUTO: 0.03 10*3/MM3 (ref 0–0.2)
BASOPHILS NFR BLD AUTO: 0.5 % (ref 0–1.5)
DEPRECATED RDW RBC AUTO: 39.5 FL (ref 37–54)
EOSINOPHIL # BLD AUTO: 0.13 10*3/MM3 (ref 0–0.7)
EOSINOPHIL NFR BLD AUTO: 2 % (ref 0.3–6.2)
ERYTHROCYTE [DISTWIDTH] IN BLOOD BY AUTOMATED COUNT: 13.1 % (ref 11.7–13)
FERRITIN SERPL-MCNC: 8 NG/ML (ref 13–150)
HCT VFR BLD AUTO: 35 % (ref 35.6–45.5)
HGB BLD-MCNC: 11.2 G/DL (ref 11.9–15.5)
IMM GRANULOCYTES # BLD: 0.03 10*3/MM3 (ref 0–0.03)
IMM GRANULOCYTES NFR BLD: 0.5 % (ref 0–0.5)
IRON 24H UR-MRATE: 29 MCG/DL (ref 37–145)
IRON SATN MFR SERPL: 7 % (ref 20–50)
LYMPHOCYTES # BLD AUTO: 1.94 10*3/MM3 (ref 0.9–4.8)
LYMPHOCYTES NFR BLD AUTO: 29.9 % (ref 19.6–45.3)
MCH RBC QN AUTO: 27.4 PG (ref 26.9–32)
MCHC RBC AUTO-ENTMCNC: 32 G/DL (ref 32.4–36.3)
MCV RBC AUTO: 85.6 FL (ref 80.5–98.2)
MONOCYTES # BLD AUTO: 0.4 10*3/MM3 (ref 0.2–1.2)
MONOCYTES NFR BLD AUTO: 6.2 % (ref 5–12)
NEUTROPHILS # BLD AUTO: 3.96 10*3/MM3 (ref 1.9–8.1)
NEUTROPHILS NFR BLD AUTO: 60.9 % (ref 42.7–76)
NRBC BLD MANUAL-RTO: 0 /100 WBC (ref 0–0)
PLATELET # BLD AUTO: 292 10*3/MM3 (ref 140–500)
PMV BLD AUTO: 9.2 FL (ref 6–12)
RBC # BLD AUTO: 4.09 10*6/MM3 (ref 3.9–5.2)
TIBC SERPL-MCNC: 414 MCG/DL (ref 298–536)
TRANSFERRIN SERPL-MCNC: 278 MG/DL (ref 200–360)
VIT B12 BLD-MCNC: 236 PG/ML (ref 211–946)
WBC NRBC COR # BLD: 6.49 10*3/MM3 (ref 4.5–10.7)

## 2018-09-13 PROCEDURE — 86147 CARDIOLIPIN ANTIBODY EA IG: CPT | Performed by: INTERNAL MEDICINE

## 2018-09-13 PROCEDURE — 85025 COMPLETE CBC W/AUTO DIFF WBC: CPT | Performed by: INTERNAL MEDICINE

## 2018-09-13 PROCEDURE — 82728 ASSAY OF FERRITIN: CPT | Performed by: INTERNAL MEDICINE

## 2018-09-13 PROCEDURE — 83540 ASSAY OF IRON: CPT | Performed by: INTERNAL MEDICINE

## 2018-09-13 PROCEDURE — 86148 ANTI-PHOSPHOLIPID ANTIBODY: CPT | Performed by: INTERNAL MEDICINE

## 2018-09-13 PROCEDURE — 36415 COLL VENOUS BLD VENIPUNCTURE: CPT

## 2018-09-13 PROCEDURE — 84466 ASSAY OF TRANSFERRIN: CPT | Performed by: INTERNAL MEDICINE

## 2018-09-13 PROCEDURE — 82607 VITAMIN B-12: CPT | Performed by: INTERNAL MEDICINE

## 2018-09-15 LAB
CARDIOLIPIN IGG SER IA-ACNC: <9 GPL U/ML (ref 0–14)
CARDIOLIPIN IGM SER IA-ACNC: 9 MPL U/ML (ref 0–12)

## 2018-09-19 LAB
PS IGA SER-ACNC: 2 APS IGA (ref 0–20)
PS IGG SER-ACNC: 2 GPS IGG (ref 0–11)
PS IGM SER-ACNC: 65 MPS IGM (ref 0–25)

## 2018-09-20 ENCOUNTER — OFFICE VISIT (OUTPATIENT)
Dept: ONCOLOGY | Facility: CLINIC | Age: 38
End: 2018-09-20

## 2018-09-20 ENCOUNTER — APPOINTMENT (OUTPATIENT)
Dept: OTHER | Facility: HOSPITAL | Age: 38
End: 2018-09-20

## 2018-09-20 VITALS
HEART RATE: 80 BPM | OXYGEN SATURATION: 100 % | SYSTOLIC BLOOD PRESSURE: 99 MMHG | DIASTOLIC BLOOD PRESSURE: 69 MMHG | RESPIRATION RATE: 16 BRPM | WEIGHT: 158 LBS | TEMPERATURE: 98.2 F | BODY MASS INDEX: 26.33 KG/M2 | HEIGHT: 65 IN

## 2018-09-20 DIAGNOSIS — D50.9 IRON DEFICIENCY ANEMIA, UNSPECIFIED IRON DEFICIENCY ANEMIA TYPE: Primary | ICD-10-CM

## 2018-09-20 DIAGNOSIS — E53.8 B12 DEFICIENCY: ICD-10-CM

## 2018-09-20 DIAGNOSIS — IMO0001 ADVERSE EFFECT OF IRON OR ITS COMPOUND, INITIAL ENCOUNTER: ICD-10-CM

## 2018-09-20 DIAGNOSIS — R21 SKIN MACULE OR MACULAR RASH: ICD-10-CM

## 2018-09-20 PROCEDURE — 99215 OFFICE O/P EST HI 40 MIN: CPT | Performed by: INTERNAL MEDICINE

## 2018-09-20 PROCEDURE — G0463 HOSPITAL OUTPT CLINIC VISIT: HCPCS | Performed by: INTERNAL MEDICINE

## 2018-09-20 RX ORDER — METHYLPREDNISOLONE SODIUM SUCCINATE 125 MG/2ML
125 INJECTION, POWDER, LYOPHILIZED, FOR SOLUTION INTRAMUSCULAR; INTRAVENOUS AS NEEDED
Status: CANCELLED | OUTPATIENT
Start: 2018-09-27

## 2018-09-20 RX ORDER — SODIUM CHLORIDE 9 MG/ML
250 INJECTION, SOLUTION INTRAVENOUS ONCE
Status: CANCELLED | OUTPATIENT
Start: 2018-09-27 | End: 2018-09-27

## 2018-09-20 RX ORDER — DIPHENHYDRAMINE HYDROCHLORIDE 50 MG/ML
50 INJECTION INTRAMUSCULAR; INTRAVENOUS AS NEEDED
Status: CANCELLED | OUTPATIENT
Start: 2018-09-27

## 2018-09-20 NOTE — PROGRESS NOTES
.     REASON FOR CONSULTATION:     1.  Provide an opinion on any further workup or treatment of anemia and possible blood clots with suspected episodes of strokes.   2.  Laboratory study on 5/31/2018 reported significantly elevated anti-phosphatidylserine IgM at 57, and marginally elevated anticardiolipin IgM 14.  All others were negative.   3.  Laboratory tests confirmed vitamin B12 deficiency and iron deficiency on 5/31/2018.    4.  Repeated laboratory study on 9/13/2018 showed persistently significantly elevated anti-phosphatidylserine IgM at 65.  She also has persistent significant iron deficiency and vitamin B12 deficiency.                                 HISTORY OF PRESENT ILLNESS:  The patient is a 38 y.o. year old female who is here for follow-up with the above-mentioned history, and to discuss laboratory results obtained a week ago.  Patient is accompanied by her  who helped with the history also.    Patient reports she has no recurrence of blurry vision, no headaches and dizziness since she started on Eliquis 5 mg twice a day in middle June 2018.  She feels more fit.  Her  also reports patient is more energetic in the past 3 months.  Patient reports no easy bleeding or bruising.  She does note somewhat increased volume of her menses.    Patient reports she was not able to tolerate even low-dose oral iron with significant constipation.  She also reports vitamin B12 causes palpitation.  So currently she is not taking oral iron or vitamin B-12.      Laboratory results on 9/13/2018 reported persistently elevated anti-phosphatidylserine at 65, however resolution of mildly elevated anticardiolipin IgM antibody.  She has persistent iron deficiency with a ferritin 8 ng/mL, iron saturation 7%, both of which actually worse than those levels on 5/31/2018.  Her B12 level also is low at 236 pg/mL.  She has worsening anemia with hemoglobin 11.2 but maintains normal WBC 6500 and platelets 292,000.      Past Medical History:   Diagnosis Date   • Anemia     B12 anemia during pregnancy in    • Antiphospholipid syndrome (CMS/HCC)    • Blurry vision, bilateral    • Cervical cancer (CMS/HCC) 1965    Stage 0    • H/O HPV in female    • H/O Incomplete RBBB 2013   • H/O Raynaud phenomenon     Probable at age 31 during pregnancy   • History of prior pregnancies     x3   • History of syncope    • MS (multiple sclerosis) (CMS/HCC)    • POTS (postural orthostatic tachycardia syndrome)    • Stroke (CMS/HCC)     Blood clot, age 26   • SVT (supraventricular tachycardia) (CMS/HCC)    • Tattoos      Past Surgical History:   Procedure Laterality Date   • CERVICAL BIOPSY  W/ LOOP ELECTRODE EXCISION       OB/GYN history.  Menarche age 17.  .  No use of birth control pill use.  No IUD.     HEMATOLOGIC/ONCOLOGIC HISTORY:  The patient is a 38 y.o. year old female who is here for initial evaluation with the above-mentioned history on 2018.    Patient presented for evaluation reported by her primary care physician Dr. Bravo because of recurrent episodes of blurry visions.  Patient also has abnormal brain MRI examination on multiple occasions.  She was origin of diagnosis of multiple sclerosis, however was told by urologist Dr. Khanna that she did not have multiple sclerosis.  Most recently patient was seen by a different neurologist Dr. Scott that her MRI images did not fit with typical MS changes, instead it may be changes related to thrombotic stroke.  This patient has chronic migraine headache, and was taking Topamax low-dose with good results but with side effects and the medicine was recently changed to Trokendl XR.     This patient also has history of iron deficiency for which she has been taking oral iron for many years.  She also had vitamin B12 deficiency.  Her laboratory study on 2017 clearly reported iron deficiency with ferritin 8 ng/mL, and she was already mildly anemic with hemoglobin 11.3,  and MCV 82.9. She had a normal WBC 6700 including neutrophils 3850 and lymphocytes 2190, monocytes 420.  Her platelets was 323,000.     Laboratory study on 3/23/2018 reported hemoglobin 11.6, MCV 83.1 MCHC 33.6.  Her WBC was 7900 including neutrophils 5240, lymphocytes 1980, monocytes 500.  Her platelets were 341,000.  Her iron studies reported free iron 106 TIBC 365 and iron saturation 29%, vitamin B12 at 242 pg/mL and vitamin D 20 ng/mL.  There was no ferritin level nor folic acid level.      Laboratory studies we obtained on 05/31/2018 showed significantly elevated antiphosphatidylserine antibody IgM subtype at 57, but negative for IgG and IgA subtype.  She is also marginally positive for anticardiolipin IgM at 14, but negative for IgG subtype.  She was tested negative for other hypercoagulable tests including anti-Beta-2 glycoprotein 1 antibodies, negative for lupus anticoagulant and normal antithrombin 103%, normal protein C activity 117%, normal protein S activity 81% with free protein S antigen 90%.  She is also negative for factor II mutation and factor V Leiden mutation.  We also checked her for comprehensive rheumatoid panel which is all negative as well as negative for the rheumatoid factor and direct WANG.  She also had iron deficiency, ferritin 12.5, iron saturation 12% 05/31/2018.     The patient presented on 6/14/2018 for reevaluation and to discuss laboratory results obtained on 05/31/2018 when I saw her for the 1st time for evaluation of possible thrombosis as well as persistent anemia.  The patient reports no changes in her clinical condition the past 6 days.  No recent episodes of blurry vision.  She is on low-dose aspirin 81 mg daily.  The patient also has been on low-dose oral iron 28 mg daily with good compliance.  She was not able to tolerate the large dose of ferrous sulfate at 65 mg tablets because of significant constipation.  The patient is not really compliant with oral vitamin B12  supplementation.    We started patient on Eliquis 5 mg twice a day on 6/14/2018.  This patient is likely having antiphospholipid antibody syndrome.    MEDICATIONS    Current Outpatient Prescriptions:   •  apixaban (ELIQUIS) 5 MG tablet tablet, Take 1 tablet by mouth Every 12 (Twelve) Hours. (Patient taking differently: Take 2.5 mg by mouth 2 (Two) Times a Day. Takes 2.5 in the am, and 2.5 mg at night.), Disp: 60 tablet, Rfl: 3    ALLERGIES:     Allergies   Allergen Reactions   • Codeine Rash     vomiting       SOCIAL HISTORY:       Social History     Social History   • Marital status:      Spouse name: Bebo   • Number of children: 3   • Years of education: College      Occupational History   • , Artist       Social History Main Topics   • Smoking status: Never Smoker   • Smokeless tobacco: Never Used   • Alcohol use Yes      Comment: wine Usually in holidays and bodies, less than 1 drink every 2 weeks    • Drug use: No    • Sexual activity: Not on file         FAMILY HISTORY:  Family History   Problem Relation Age of Onset   • Cancer Mother Diagnosed of uterine cancer at age 38 post surgery.  Currently age 64 in 2018 no details.      • Other Mother, Also has chronic anemia.  Gallbladder stone.           Heart murmur   • ADD / ADHD Brother    Family history of strokes in grandmother also had Alzheimer's disease.   Gallbladder stone in mother, grandmother and 3 aunts.     REVIEW OF SYSTEMS:  Review of Systems   Constitutional: Negative for chills, fatigue and fever.   HENT: Negative for congestion, facial swelling and rhinorrhea.    Respiratory: Negative for shortness of breath.    Cardiovascular: Positive for palpitations. Negative for chest pain and leg swelling.   Gastrointestinal: Negative for abdominal pain, blood in stool, diarrhea and nausea.   Endocrine: Negative for polyuria.   Genitourinary: Negative for dysuria, frequency and hematuria.   Musculoskeletal: Positive for back pain. Negative  "for arthralgias and joint swelling.   Allergic/Immunologic: Negative for immunocompromised state.   Neurological: Positive for numbness. Negative for dizziness, syncope, speech difficulty and headaches.   Hematological: Negative for adenopathy. Does not bruise/bleed easily.   Psychiatric/Behavioral: Negative for agitation and confusion.            Vitals:    09/20/18 0907   BP: 99/69   Pulse: 80   Resp: 16   Temp: 98.2 °F (36.8 °C)   TempSrc: Oral   SpO2: 100%   Weight: 71.7 kg (158 lb)   Height: 164.5 cm (64.76\")   PainSc: 0-No pain     Current Status 9/20/2018   ECOG score 0      PHYSICAL EXAM:      CONSTITUTIONAL:  Well-developed and well-nourished female.  No distress, looks comfortable.  EYES:  Conjunctiva and lids unremarkable.  PERRLA  EARS,NOSE,MOUTH,THROAT:  Ears and nose appear unremarkable.  Oral mucosa moist.  Lips appear unremarkable.  RESPIRATORY:  Normal respiratory effort.  Lungs clear to auscultation bilaterally.  CARDIOVASCULAR:  Regular rhythm and rate.  Normal S1, S2.  No murmurs.      GASTROINTESTINAL: Abdomen appears unremarkable.  Nontender.  No hepatomegaly.  No splenomegaly.  Bowel sounds normal.  LYMPHATIC:  No cervical, supraclavicular lymphadenopathy.  MUSCULOSKELETAL:  No significant lower extremity edema.   Unremarkable gait.  No cyanosis or clubbing.  SKIN:  Warm.  No rashes.    PSYCHIATRIC:  Normal judgment and insight.  Normal mood and affect.      RECENT LABS:    Lab Results   Component Value Date    WBC 6.49 09/13/2018    HGB 11.2 (L) 09/13/2018    HCT 35.0 (L) 09/13/2018    MCV 85.6 09/13/2018     09/13/2018     Lab Results   Component Value Date    NEUTROABS 3.96 09/13/2018     Lab Results   Component Value Date    [facility] 236 09/13/2018     Lab Results   Component Value Date    FOLATE >20.00 05/31/2018     Lab Results   Component Value Date    IRON 29 (L) 09/13/2018    TIBC 414 09/13/2018    FERRITIN 8.00 (L) 09/13/2018   Iron saturation 7%    Phosphatidylserine " Antibodies   Order: [id number]   Status:  Final result   Visible to patient:  No (Not Released)   Dx:  Hypercoagulation syndrome (CMS/HCC)    Ref Range & Units 7d ago   Antiphosphatidylserine IgM 0 - 25 MPS IgM 65     Antiphosphatidylserine IgA 0 - 20 APS IgA 2    Antiphosphatidylserine IgG 0 - 11 GPS IgG 2    Resulting Agency  [facility]             Anticardiolipin Antibody, IgG / M, Qn   Order: [id number]   Status:  Final result   Visible to patient:  No (Not Released)   Dx:  Hypercoagulation syndrome (CMS/HCC)    Ref Range & Units 7d ago   Anticardiolipin IgG 0 - 14 GPL U/mL <9    Comment:                           Negative:              <15                             Indeterminate:     15 - 20                             Low-Med Positive: >20 - 80                             High Positive:         >80   Anticardiolipin IgM 0 - 12 MPL U/mL 9    Comment:                           Negative:              <13                             Indeterminate:     13 - 20                             Low-Med Positive: >20 - 80                             High Positive:         >80   Resulting Agency  [facility]               Assessment/Plan      1.  Antiphospholipid antibody syndrome, with significantly anti-phosphatidylserine IgM antibody twice more than 3 months apart.  This patient had symptoms with blurry vision, bilateral, ? Cerebrovascular accident (CVA), multiple sclerosis?  This patient has multiple major complaints, and was thought related to multiple sclerosis, however she was told by 2 different neurologists that the MRI changes did not fit with multiple sclerosis.  Instead it may be more of stroke.  This patient also has intermittent lower extremity edema although no previous evidence of venous thrombosis.     Her laboratory studies in May 2018 reported significantly elevated antiphosphatidylserine antibody IgM at 57.  Will repeat laboratory study on 9/13/2018, she had a persistently elevated anti-phosphatidylserine  antibody IgM 65.  So she has antiphospholipid syndrome.      Since we started her on Eliquis 5 mg twice a day in middle June 2018, she reports no recurrence of blurry vision, and headaches.  She feels overall improved functionality.      I discussed with the patient and her , she will need long term anticoagulation.  We'll repeat her anti-phosphatidylserine IgM again in the future may be one year later.    Patient also asked whether she could have more child, as they are planning to have.  I discussed with him briefly, that she needs to be switched to Lovenox anticoagulation as soon as she is aware of pregnancy.  At that usually can be managed very safely throughout her pregnancy.        2.  Iron deficiency anemia.  Patient previously was not able to tolerate high-dose ferrous sulfate because of significant constipation.  So she switch her to low-dose oral iron once a day for a few years with good compliance.  In July because of recurrent on deficiency, I asked to increase to 3 times a day however she is not able to tolerate it because of constipation problem.      We repeated her laboratory study again on 9/13/2018, she actually has worsening iron saturation and ferritin level.  She also reports somewhat increased menstrual volume since she is being on oral Eliquis for anticoagulation.      With the above reason, I recommended intravenous iron therapy with injectofar for 2 weekly doses.  I explained to her potential allergic reaction and his she may be given IV steroids if that happens.      3.  B12 deficiency.  This patient has history of B12 deficiency and the was given B12 injection previously.  However she has not received a B12 in the past couple of years.  Recently I instructed her to restart taking oral vitamin B12, however she reports that vitamin B12 causes palpitation which is unheard of.  I asked patient to retry to see if she can tolerate.  As I discussed with her previously, and again today that  we will try to avoid using vitamin B12 intramuscular injection because of the risk of intramuscular hematoma as she is on anticoagulation medicine.     If she is not tolerated again, or not responding to oral vitamin B12 then we can potentially switch her to subcutaneous B12 injection, which is not the best way to give her vitamin B12.  The patient is agreeable to try again Vitamin B12 by mouth..      4.  Facial skin malar rash.  This is very faint facial skin rash.  She denies pruritus.  She has completely negative laboratory studies for Lupus disease.  We'll monitor this.  If she has worsening skin rash,  She will need dermatology evaluation.      PLAN:      1.  Continue Eliquis 5 mg twice a day.  She could continue to take low-dose aspirin.  She needs to be cautious for injury or bleeding.   2.  Arrange intravenous injectofar weekly for 2 doses.  3.  Restart vitamin B12 oral 1000 mcg daily.   4.  I will bring the patient back for reevaluation in 3 months with laboratory study including ferritin, iron profile, vitamin B12 level and CBC.    5.  Patient will inform us as soon as she is aware of pregnancy.  We'll switch her to Lovenox anticoagulation weight-based every 12 hours.      More than 40 min was spent for patient to care, over half of that time were for counseling.    CRISTIAN YANG M.D., Ph.D.    9/19/2018      CC: Marii Bravo MD        Dictated using Dragon Dictation.

## 2018-09-27 ENCOUNTER — INFUSION (OUTPATIENT)
Dept: ONCOLOGY | Facility: HOSPITAL | Age: 38
End: 2018-09-27

## 2018-09-27 VITALS
WEIGHT: 158.4 LBS | TEMPERATURE: 98 F | OXYGEN SATURATION: 100 % | HEART RATE: 78 BPM | BODY MASS INDEX: 26.55 KG/M2 | DIASTOLIC BLOOD PRESSURE: 73 MMHG | SYSTOLIC BLOOD PRESSURE: 109 MMHG

## 2018-09-27 DIAGNOSIS — D50.9 IRON DEFICIENCY ANEMIA, UNSPECIFIED IRON DEFICIENCY ANEMIA TYPE: ICD-10-CM

## 2018-09-27 DIAGNOSIS — IMO0001 ADVERSE EFFECT OF IRON OR ITS COMPOUND, INITIAL ENCOUNTER: Primary | ICD-10-CM

## 2018-09-27 PROCEDURE — 25010000002 FERRIC CARBOXYMALTOSE 750 MG/15ML SOLUTION 15 ML VIAL: Performed by: INTERNAL MEDICINE

## 2018-09-27 PROCEDURE — 96374 THER/PROPH/DIAG INJ IV PUSH: CPT | Performed by: INTERNAL MEDICINE

## 2018-09-27 RX ORDER — SODIUM CHLORIDE 9 MG/ML
250 INJECTION, SOLUTION INTRAVENOUS ONCE
Status: CANCELLED | OUTPATIENT
Start: 2018-09-27 | End: 2018-09-27

## 2018-09-27 RX ORDER — DIPHENHYDRAMINE HYDROCHLORIDE 50 MG/ML
50 INJECTION INTRAMUSCULAR; INTRAVENOUS AS NEEDED
Status: CANCELLED | OUTPATIENT
Start: 2018-09-27

## 2018-09-27 RX ORDER — SODIUM CHLORIDE 9 MG/ML
250 INJECTION, SOLUTION INTRAVENOUS ONCE
Status: COMPLETED | OUTPATIENT
Start: 2018-09-27 | End: 2018-09-27

## 2018-09-27 RX ORDER — METHYLPREDNISOLONE SODIUM SUCCINATE 125 MG/2ML
125 INJECTION, POWDER, LYOPHILIZED, FOR SOLUTION INTRAMUSCULAR; INTRAVENOUS AS NEEDED
Status: CANCELLED | OUTPATIENT
Start: 2018-09-27

## 2018-09-27 RX ADMIN — FERRIC CARBOXYMALTOSE INJECTION 750 MG: 50 INJECTION, SOLUTION INTRAVENOUS at 09:10

## 2018-09-27 RX ADMIN — SODIUM CHLORIDE 250 ML: 900 INJECTION, SOLUTION INTRAVENOUS at 09:08

## 2018-10-04 ENCOUNTER — INFUSION (OUTPATIENT)
Dept: ONCOLOGY | Facility: HOSPITAL | Age: 38
End: 2018-10-04

## 2018-10-04 VITALS
BODY MASS INDEX: 26.48 KG/M2 | DIASTOLIC BLOOD PRESSURE: 58 MMHG | TEMPERATURE: 98.2 F | OXYGEN SATURATION: 100 % | HEART RATE: 86 BPM | WEIGHT: 158 LBS | SYSTOLIC BLOOD PRESSURE: 94 MMHG

## 2018-10-04 DIAGNOSIS — D50.9 IRON DEFICIENCY ANEMIA, UNSPECIFIED IRON DEFICIENCY ANEMIA TYPE: ICD-10-CM

## 2018-10-04 DIAGNOSIS — IMO0001 ADVERSE EFFECT OF IRON OR ITS COMPOUND, INITIAL ENCOUNTER: Primary | ICD-10-CM

## 2018-10-04 LAB
BASOPHILS # BLD AUTO: 0.04 10*3/MM3 (ref 0–0.2)
BASOPHILS NFR BLD AUTO: 0.4 % (ref 0–1.5)
DEPRECATED RDW RBC AUTO: 41.3 FL (ref 37–54)
EOSINOPHIL # BLD AUTO: 0.16 10*3/MM3 (ref 0–0.7)
EOSINOPHIL NFR BLD AUTO: 1.7 % (ref 0.3–6.2)
ERYTHROCYTE [DISTWIDTH] IN BLOOD BY AUTOMATED COUNT: 13.7 % (ref 11.7–13)
HCT VFR BLD AUTO: 31.2 % (ref 35.6–45.5)
HGB BLD-MCNC: 10.1 G/DL (ref 11.9–15.5)
IMM GRANULOCYTES # BLD: 0.03 10*3/MM3 (ref 0–0.03)
IMM GRANULOCYTES NFR BLD: 0.3 % (ref 0–0.5)
LYMPHOCYTES # BLD AUTO: 2.72 10*3/MM3 (ref 0.9–4.8)
LYMPHOCYTES NFR BLD AUTO: 29 % (ref 19.6–45.3)
MCH RBC QN AUTO: 27.8 PG (ref 26.9–32)
MCHC RBC AUTO-ENTMCNC: 32.4 G/DL (ref 32.4–36.3)
MCV RBC AUTO: 86 FL (ref 80.5–98.2)
MONOCYTES # BLD AUTO: 0.46 10*3/MM3 (ref 0.2–1.2)
MONOCYTES NFR BLD AUTO: 4.9 % (ref 5–12)
NEUTROPHILS # BLD AUTO: 5.96 10*3/MM3 (ref 1.9–8.1)
NEUTROPHILS NFR BLD AUTO: 63.7 % (ref 42.7–76)
NRBC BLD MANUAL-RTO: 0 /100 WBC (ref 0–0)
PLATELET # BLD AUTO: 316 10*3/MM3 (ref 140–500)
PMV BLD AUTO: 8.9 FL (ref 6–12)
RBC # BLD AUTO: 3.63 10*6/MM3 (ref 3.9–5.2)
WBC NRBC COR # BLD: 9.37 10*3/MM3 (ref 4.5–10.7)

## 2018-10-04 PROCEDURE — 85025 COMPLETE CBC W/AUTO DIFF WBC: CPT | Performed by: INTERNAL MEDICINE

## 2018-10-04 PROCEDURE — 96374 THER/PROPH/DIAG INJ IV PUSH: CPT | Performed by: INTERNAL MEDICINE

## 2018-10-04 PROCEDURE — 25010000002 FERRIC CARBOXYMALTOSE 750 MG/15ML SOLUTION 15 ML VIAL: Performed by: INTERNAL MEDICINE

## 2018-10-04 RX ORDER — METHYLPREDNISOLONE SODIUM SUCCINATE 125 MG/2ML
125 INJECTION, POWDER, LYOPHILIZED, FOR SOLUTION INTRAMUSCULAR; INTRAVENOUS AS NEEDED
Status: CANCELLED | OUTPATIENT
Start: 2018-10-04

## 2018-10-04 RX ORDER — DIPHENHYDRAMINE HYDROCHLORIDE 50 MG/ML
50 INJECTION INTRAMUSCULAR; INTRAVENOUS AS NEEDED
Status: CANCELLED | OUTPATIENT
Start: 2018-10-04

## 2018-10-04 RX ORDER — SODIUM CHLORIDE 9 MG/ML
250 INJECTION, SOLUTION INTRAVENOUS ONCE
Status: COMPLETED | OUTPATIENT
Start: 2018-10-04 | End: 2018-10-04

## 2018-10-04 RX ORDER — SODIUM CHLORIDE 9 MG/ML
250 INJECTION, SOLUTION INTRAVENOUS ONCE
Status: CANCELLED | OUTPATIENT
Start: 2018-10-04 | End: 2018-10-04

## 2018-10-04 RX ADMIN — SODIUM CHLORIDE 250 ML: 900 INJECTION, SOLUTION INTRAVENOUS at 09:55

## 2018-10-04 RX ADMIN — FERRIC CARBOXYMALTOSE INJECTION 750 MG: 50 INJECTION, SOLUTION INTRAVENOUS at 09:55

## 2018-10-05 ENCOUNTER — TELEPHONE (OUTPATIENT)
Dept: ONCOLOGY | Facility: CLINIC | Age: 38
End: 2018-10-05

## 2018-10-05 NOTE — TELEPHONE ENCOUNTER
I called and spoke with patient this evening.  SHe has been holding Eliquis last night and today, bleeding is slowing down.  This is not on her regular menstral cycle, she just had it about 2 weeks ago.  She has appointment to see her GP, likely needs to be referred to GYN for further evaluation.  I asked her to keep us updated.      CRISTIAN YANG M.D., Ph.D.    10/05/2018

## 2018-10-22 RX ORDER — APIXABAN 5 MG/1
TABLET, FILM COATED ORAL
Qty: 60 TABLET | Refills: 2 | Status: SHIPPED | OUTPATIENT
Start: 2018-10-22 | End: 2019-06-20

## 2018-12-13 ENCOUNTER — OFFICE VISIT (OUTPATIENT)
Dept: ONCOLOGY | Facility: CLINIC | Age: 38
End: 2018-12-13

## 2018-12-13 ENCOUNTER — LAB (OUTPATIENT)
Dept: OTHER | Facility: HOSPITAL | Age: 38
End: 2018-12-13

## 2018-12-13 VITALS
DIASTOLIC BLOOD PRESSURE: 74 MMHG | RESPIRATION RATE: 14 BRPM | BODY MASS INDEX: 26.82 KG/M2 | OXYGEN SATURATION: 99 % | TEMPERATURE: 98 F | HEART RATE: 68 BPM | WEIGHT: 161 LBS | HEIGHT: 65 IN | SYSTOLIC BLOOD PRESSURE: 108 MMHG

## 2018-12-13 DIAGNOSIS — R20.0 NUMBNESS OF FINGERS OF BOTH HANDS: ICD-10-CM

## 2018-12-13 DIAGNOSIS — E53.8 B12 DEFICIENCY: ICD-10-CM

## 2018-12-13 DIAGNOSIS — D68.59 HYPERCOAGULATION SYNDROME (HCC): ICD-10-CM

## 2018-12-13 DIAGNOSIS — IMO0001 ADVERSE EFFECT OF IRON OR ITS COMPOUND, INITIAL ENCOUNTER: ICD-10-CM

## 2018-12-13 DIAGNOSIS — D50.9 IRON DEFICIENCY ANEMIA, UNSPECIFIED IRON DEFICIENCY ANEMIA TYPE: Primary | ICD-10-CM

## 2018-12-13 DIAGNOSIS — H53.8 BLURRY VISION, BILATERAL: ICD-10-CM

## 2018-12-13 DIAGNOSIS — D50.9 IRON DEFICIENCY ANEMIA, UNSPECIFIED IRON DEFICIENCY ANEMIA TYPE: ICD-10-CM

## 2018-12-13 DIAGNOSIS — I63.9 CEREBROVASCULAR ACCIDENT (CVA), UNSPECIFIED MECHANISM (HCC): ICD-10-CM

## 2018-12-13 LAB
BASOPHILS # BLD AUTO: 0.04 10*3/MM3 (ref 0–0.2)
BASOPHILS NFR BLD AUTO: 0.5 % (ref 0–1.5)
DEPRECATED RDW RBC AUTO: 39.7 FL (ref 37–54)
EOSINOPHIL # BLD AUTO: 0.14 10*3/MM3 (ref 0–0.7)
EOSINOPHIL NFR BLD AUTO: 1.8 % (ref 0.3–6.2)
ERYTHROCYTE [DISTWIDTH] IN BLOOD BY AUTOMATED COUNT: 12.3 % (ref 11.7–13)
FERRITIN SERPL-MCNC: 206.1 NG/ML (ref 13–150)
HCT VFR BLD AUTO: 38.3 % (ref 35.6–45.5)
HGB BLD-MCNC: 12.8 G/DL (ref 11.9–15.5)
IMM GRANULOCYTES # BLD: 0.03 10*3/MM3 (ref 0–0.03)
IMM GRANULOCYTES NFR BLD: 0.4 % (ref 0–0.5)
IRON 24H UR-MRATE: 45 MCG/DL (ref 37–145)
IRON SATN MFR SERPL: 18 % (ref 20–50)
LYMPHOCYTES # BLD AUTO: 2.31 10*3/MM3 (ref 0.9–4.8)
LYMPHOCYTES NFR BLD AUTO: 30.1 % (ref 19.6–45.3)
MCH RBC QN AUTO: 29.4 PG (ref 26.9–32)
MCHC RBC AUTO-ENTMCNC: 33.4 G/DL (ref 32.4–36.3)
MCV RBC AUTO: 88 FL (ref 80.5–98.2)
MONOCYTES # BLD AUTO: 0.39 10*3/MM3 (ref 0.2–1.2)
MONOCYTES NFR BLD AUTO: 5.1 % (ref 5–12)
NEUTROPHILS # BLD AUTO: 4.76 10*3/MM3 (ref 1.9–8.1)
NEUTROPHILS NFR BLD AUTO: 62.1 % (ref 42.7–76)
NRBC BLD MANUAL-RTO: 0 /100 WBC (ref 0–0)
PLATELET # BLD AUTO: 262 10*3/MM3 (ref 140–500)
PMV BLD AUTO: 9.1 FL (ref 6–12)
RBC # BLD AUTO: 4.35 10*6/MM3 (ref 3.9–5.2)
TIBC SERPL-MCNC: 252 MCG/DL (ref 298–536)
TRANSFERRIN SERPL-MCNC: 169 MG/DL (ref 200–360)
VIT B12 BLD-MCNC: 264 PG/ML (ref 211–946)
WBC NRBC COR # BLD: 7.67 10*3/MM3 (ref 4.5–10.7)

## 2018-12-13 PROCEDURE — 82728 ASSAY OF FERRITIN: CPT | Performed by: INTERNAL MEDICINE

## 2018-12-13 PROCEDURE — 82607 VITAMIN B-12: CPT | Performed by: INTERNAL MEDICINE

## 2018-12-13 PROCEDURE — 36415 COLL VENOUS BLD VENIPUNCTURE: CPT

## 2018-12-13 PROCEDURE — 99215 OFFICE O/P EST HI 40 MIN: CPT | Performed by: INTERNAL MEDICINE

## 2018-12-13 PROCEDURE — 83540 ASSAY OF IRON: CPT | Performed by: INTERNAL MEDICINE

## 2018-12-13 PROCEDURE — 85025 COMPLETE CBC W/AUTO DIFF WBC: CPT | Performed by: INTERNAL MEDICINE

## 2018-12-13 PROCEDURE — 84466 ASSAY OF TRANSFERRIN: CPT | Performed by: INTERNAL MEDICINE

## 2018-12-13 RX ORDER — PNV NO.95/FERROUS FUM/FOLIC AC 28MG-0.8MG
TABLET ORAL WEEKLY
COMMUNITY
End: 2021-03-09 | Stop reason: ALTCHOICE

## 2018-12-13 NOTE — PROGRESS NOTES
.     REASON FOR CONSULTATION:     1.  Provide an opinion on any further workup or treatment of anemia and possible blood clots with suspected episodes of strokes.   2.  Laboratory study on 5/31/2018 reported significantly elevated anti-phosphatidylserine IgM at 57, and marginally elevated anticardiolipin IgM 14.  All others were negative.   3.  Laboratory tests confirmed vitamin B12 deficiency and iron deficiency on 5/31/2018.    4.  Repeated laboratory study on 9/13/2018 showed persistently significantly elevated anti-phosphatidylserine IgM at 65.  She also has persistent significant iron deficiency and vitamin B12 deficiency.   5.  Patient was not able to tolerate oral iron due to significant constipation, she was given 2 doses Injectafer in end of September 2018.                                    HISTORY OF PRESENT ILLNESS:  The patient is a 38 y.o. year old female who is here for follow-up after her recent IV Injectafer at the end of September and early October.  She reports improved energy level.  The patient also had episodes of significant vaginal bleeding at that time.  She was seen by her OB-GYN physician and there was no pathology discovery.  The patient reports this has now resolved.  She now has regular cycles but heavy volume, more than previously before she was started on anticoagulation.      The patient reports she is tolerating Eliquis anticoagulation otherwise.  Overall she feels better with less frequent visual problems; however, still has this at times.  She also reports numbness involving her extremities.  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  This is very frustrating for her.  She denies nausea or vomiting.     Laboratory study today reported normalized hemoglobin at 12.4, MCV 88.0, MCHC 33.4.  She maintains normal platelets at 262,000 and WBC 7670.  Her iron study showed significantly improved ferritin 206 ng/mL and iron saturation 18%.  Her  vitamin B12 level is pending.      I discussed with the patient and recommended neurology consultation because of her recurrent dizziness and numbness involving her hands.  She has been on anticoagulation and has not had recurrent stroke.  She also reports improved symptomology, but still has some residual problem which still interferes with her quality of life. I think she needs a neurology consultation for reevaluation.  The patient is agreeable.    Patient reports that she sometimes take oral iron once a day but not on a daily basis because of constipation.        Past Medical History:   Diagnosis Date   • Anemia     B12 anemia during pregnancy in    • Antiphospholipid syndrome (CMS/HCC)    • Blurry vision, bilateral    • Cervical cancer (CMS/HCC) 1965    Stage 0    • H/O HPV in female    • H/O Incomplete RBBB 2013   • H/O Raynaud phenomenon     Probable at age 31 during pregnancy   • History of prior pregnancies     x3   • History of syncope    • MS (multiple sclerosis) (CMS/HCC)    • POTS (postural orthostatic tachycardia syndrome)    • Stroke (CMS/HCC)     Blood clot, age 26   • SVT (supraventricular tachycardia) (CMS/HCC)    • Tattoos      Past Surgical History:   Procedure Laterality Date   • CERVICAL BIOPSY  W/ LOOP ELECTRODE EXCISION       OB/GYN history.  Menarche age 17.  .  No use of birth control pill use.  No IUD.     HEMATOLOGIC/ONCOLOGIC HISTORY:  The patient is a 38 y.o. year old female who is here for initial evaluation with the above-mentioned history on 2018.    Patient presented for evaluation reported by her primary care physician Dr. Bravo because of recurrent episodes of blurry visions.  Patient also has abnormal brain MRI examination on multiple occasions.  She was origin of diagnosis of multiple sclerosis, however was told by urologist Dr. Khanna that she did not have multiple sclerosis.  Most recently patient was seen by a different neurologist Dr. Scott that her  MRI images did not fit with typical MS changes, instead it may be changes related to thrombotic stroke.  This patient has chronic migraine headache, and was taking Topamax low-dose with good results but with side effects and the medicine was recently changed to Trokendl XR.     This patient also has history of iron deficiency for which she has been taking oral iron for many years.  She also had vitamin B12 deficiency.  Her laboratory study on 11/20/2017 clearly reported iron deficiency with ferritin 8 ng/mL, and she was already mildly anemic with hemoglobin 11.3, and MCV 82.9. She had a normal WBC 6700 including neutrophils 3850 and lymphocytes 2190, monocytes 420.  Her platelets was 323,000.     Laboratory study on 3/23/2018 reported hemoglobin 11.6, MCV 83.1 MCHC 33.6.  Her WBC was 7900 including neutrophils 5240, lymphocytes 1980, monocytes 500.  Her platelets were 341,000.  Her iron studies reported free iron 106 TIBC 365 and iron saturation 29%, vitamin B12 at 242 pg/mL and vitamin D 20 ng/mL.  There was no ferritin level nor folic acid level.      Laboratory studies we obtained on 05/31/2018 showed significantly elevated antiphosphatidylserine antibody IgM subtype at 57, but negative for IgG and IgA subtype.  She is also marginally positive for anticardiolipin IgM at 14, but negative for IgG subtype.  She was tested negative for other hypercoagulable tests including anti-Beta-2 glycoprotein 1 antibodies, negative for lupus anticoagulant and normal antithrombin 103%, normal protein C activity 117%, normal protein S activity 81% with free protein S antigen 90%.  She is also negative for factor II mutation and factor V Leiden mutation.  We also checked her for comprehensive rheumatoid panel which is all negative as well as negative for the rheumatoid factor and direct WANG.  She also had iron deficiency, ferritin 12.5, iron saturation 12% 05/31/2018.     The patient presented on 6/14/2018 for reevaluation and to  discuss laboratory results obtained on 05/31/2018 when I saw her for the 1st time for evaluation of possible thrombosis as well as persistent anemia.  The patient reports no changes in her clinical condition the past 6 days.  No recent episodes of blurry vision.  She is on low-dose aspirin 81 mg daily.  The patient also has been on low-dose oral iron 28 mg daily with good compliance.  She was not able to tolerate the large dose of ferrous sulfate at 65 mg tablets because of significant constipation.  The patient is not really compliant with oral vitamin B12 supplementation.    We started patient on Eliquis 5 mg twice a day on 6/14/2018.  This patient is likely having antiphospholipid antibody syndrome.    In September 2018, patient reports she has no recurrence of blurry vision, no headaches and dizziness since she started on Eliquis 5 mg twice a day in middle June 2018.  She feels more fit.  Her  also reports patient is more energetic in the past 3 months.  Patient reports no easy bleeding or bruising.  She does note somewhat increased volume of her menses.    Patient reports she was not able to tolerate even low-dose oral iron with significant constipation.  She also reports vitamin B12 causes palpitation.  So currently she is not taking oral iron or vitamin B-12.      Laboratory results on 9/13/2018 reported persistently elevated anti-phosphatidylserine IgM at 65, however resolution of mildly elevated anticardiolipin IgM antibody.  She has persistent iron deficiency with ferritin 8 ng/mL, iron saturation 7%, both of which actually worse than those levels on 5/31/2018.  Her B12 level also is low at 236 pg/mL.  She has worsening anemia with hemoglobin 11.2 but maintains normal WBC 6500 and platelets 292,000.       MEDICATIONS    Current Outpatient Medications:   •  ELIQUIS 5 MG tablet tablet, TAKE ONE TABLET BY MOUTH EVERY 12 HOURS, Disp: 60 tablet, Rfl: 2  •  Ferrous Sulfate (IRON) 325 (65 Fe) MG tablet,  Take  by mouth., Disp: , Rfl:     ALLERGIES:     Allergies   Allergen Reactions   • Codeine Rash     vomiting       SOCIAL HISTORY:       Social History     Social History   • Marital status:      Spouse name: Bebo   • Number of children: 3   • Years of education: College      Occupational History   • , Artist       Social History Main Topics   • Smoking status: Never Smoker   • Smokeless tobacco: Never Used   • Alcohol use Yes      Comment: wine Usually in holidays and bodies, less than 1 drink every 2 weeks    • Drug use: No    • Sexual activity: Not on file         FAMILY HISTORY:  Family History   Problem Relation Age of Onset   • Cancer Mother Diagnosed of uterine cancer at age 38 post surgery.  Currently age 64 in 2018 no details.      • Other Mother, Also has chronic anemia.  Gallbladder stone.           Heart murmur   • ADD / ADHD Brother    Family history of strokes in grandmother also had Alzheimer's disease.   Gallbladder stone in mother, grandmother and 3 aunts.     REVIEW OF SYSTEMS:  Review of Systems   Constitutional: Negative for chills, fatigue and fever.   HENT: Negative for congestion, facial swelling and rhinorrhea.    Eyes: Positive for visual disturbance.   Respiratory: Negative for shortness of breath.    Cardiovascular: Positive for palpitations. Negative for chest pain and leg swelling.   Gastrointestinal: Negative for abdominal pain, blood in stool, diarrhea and nausea.   Endocrine: Negative for polyuria.   Genitourinary: Negative for dysuria, frequency and hematuria.   Musculoskeletal: Negative for arthralgias, back pain and joint swelling.        Trouble with her hands   Allergic/Immunologic: Negative for immunocompromised state.   Neurological: Negative for dizziness, syncope, speech difficulty, numbness and headaches.   Hematological: Negative for adenopathy. Does not bruise/bleed easily.   Psychiatric/Behavioral: Negative for agitation and confusion.           "  Vitals:    12/13/18 0914   BP: 108/74   Pulse: 68   Resp: 14   Temp: 98 °F (36.7 °C)   SpO2: 99%   Weight: 73 kg (161 lb)   Height: 164.5 cm (64.76\")   PainSc: 0-No pain     Current Status 12/13/2018   ECOG score 0      PHYSICAL EXAM:      CONSTITUTIONAL:  Well-developed and well-nourished female.  No distress, looks comfortable.  EYES:  Conjunctiva and lids unremarkable.  PERRLA  EARS,NOSE,MOUTH,THROAT:  Ears and nose appear unremarkable.  Oral mucosa moist.  Lips appear unremarkable.  RESPIRATORY:  Normal respiratory effort.  Lungs clear to auscultation bilaterally.  CARDIOVASCULAR:  Regular rhythm and rate.  Normal S1, S2.  No murmurs.      GASTROINTESTINAL: Abdomen appears unremarkable.  Nontender.  No hepatomegaly.  No splenomegaly.  Bowel sounds normal.  LYMPHATIC:  No cervical, supraclavicular lymphadenopathy.  MUSCULOSKELETAL:  No significant lower extremity edema.   Unremarkable gait.  No cyanosis or clubbing.  SKIN:  Warm.  No rashes.    PSYCHIATRIC:  Normal judgment and insight.  Normal mood and affect.      RECENT LABS:    Lab Results   Component Value Date    WBC 7.67 12/13/2018    HGB 12.8 12/13/2018    HCT 38.3 12/13/2018    MCV 88.0 12/13/2018     12/13/2018     Lab Results   Component Value Date    NEUTROABS 4.76 12/13/2018       Lab Results   Component Value Date    IRON 29 (L) 09/13/2018    TIBC 414 09/13/2018    FERRITIN 8.00 (L) 09/13/2018   Iron saturation 7%    Lab Results   Component Value Date    IRON 45 12/13/2018    TIBC 252 (L) 12/13/2018    FERRITIN 206.10 (H) 12/13/2018     Iron saturation 18% on 12/13/2018        Assessment/Plan      1.  Antiphospholipid antibody syndrome, with significantly anti-phosphatidylserine IgM antibody twice more than 3 months apart.  This patient had symptoms with blurry vision, bilateral, ? Cerebrovascular accident (CVA).  This patient has multiple major complaints, and was thought related to multiple sclerosis, however she was told by 2 different " neurologists that the MRI changes did not fit with multiple sclerosis.  Instead it may be more of stroke.  This patient also has intermittent lower extremity edema although no previous evidence of venous thrombosis.     Her laboratory studies in May 2018 reported significantly elevated antiphosphatidylserine antibody IgM at 57.  We repeated laboratory study on 9/13/2018, she had persistently elevated anti-phosphatidylserine antibody IgM 65.  So she has antiphospholipid syndrome.      Since we started her on Eliquis 5 mg twice a day in middle June 2018, she initially reports resolution of blurry vision.  However now she claims she has recurrent pleural effusion from time to time.   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       Patients who have reevaluation by neurologist.  However she does not want to go back to her previous neurologist..  So I will refer patient to neurologist at [facility].     2.  Iron deficiency anemia.  Patient previously was not able to tolerate high-dose ferrous sulfate because of significant constipation.  So she switch her to low-dose oral iron once a day for a few years with good compliance.  In July because of recurrent on deficiency, I asked to increase to 3 times a day however she is not able to tolerate it because of constipation problem.      We repeated her laboratory study again on 9/13/2018, she actually has worsening iron saturation and ferritin level.  She also reports somewhat increased menstrual volume since she is being on oral Eliquis for anticoagulation.      With the above reason, we treated her with 2 doses of intravenous iron with Injectofar in end of September and early October 2018.  Patient has great response.  She reports only taking oral iron once a day sometimes.     3.  B12 deficiency.  This patient has history of B12 deficiency and was given B12 injection previously.   However she has not received B12 in the past couple of years.  I instructed her to restart taking oral vitamin B12, however she reported that vitamin B12 causes palpitation. Patient tried again but still not able to tolerate.     As I discussed with her previously, we would try to avoid using vitamin B12 intramuscular injection because of the risk of intramuscular hematoma as she is on anticoagulation.     We are waiting for her vitamin B12 level.  We can potentially switch her to subcutaneous B12 injection.        PLAN:      1.  Continue Eliquis 5 mg twice a day.  She needs to be cautious for injury or bleeding.   2.  Refer patient for neurologic consultation at the [facility] [place].   3.  Waiting for vitamin B12 level.   4.  Repeat laboratory studies in 3 months including ferritin, iron profile, and CBC.    5.  Patient will inform us as soon as she is aware of pregnancy.  We'll switch her to Lovenox anticoagulation weight-based every 12 hours.   6.  Patient will attending 6 months for M.D. Evaluation, we'll repeat laboratory studies for CBC, ferritin, iron profile and vitamin B12 level.      More than 40 min was spent for patient to care, over half of that time were for counseling.    CRISTIAN YANG M.D., Ph.D.    12/13/2018      CC: Marii Bravo MD        Dictated using Dragon Dictation.

## 2019-01-21 RX ORDER — APIXABAN 5 MG/1
TABLET, FILM COATED ORAL
Qty: 60 TABLET | Refills: 6 | Status: SHIPPED | OUTPATIENT
Start: 2019-01-21 | End: 2019-09-26

## 2019-03-07 ENCOUNTER — LAB (OUTPATIENT)
Dept: OTHER | Facility: HOSPITAL | Age: 39
End: 2019-03-07

## 2019-03-07 DIAGNOSIS — D50.9 IRON DEFICIENCY ANEMIA, UNSPECIFIED IRON DEFICIENCY ANEMIA TYPE: ICD-10-CM

## 2019-03-07 LAB
BASOPHILS # BLD AUTO: 0.04 10*3/MM3 (ref 0–0.2)
BASOPHILS NFR BLD AUTO: 0.6 % (ref 0–1.5)
DEPRECATED RDW RBC AUTO: 39.1 FL (ref 37–54)
EOSINOPHIL # BLD AUTO: 0.13 10*3/MM3 (ref 0–0.4)
EOSINOPHIL NFR BLD AUTO: 2 % (ref 0.3–6.2)
ERYTHROCYTE [DISTWIDTH] IN BLOOD BY AUTOMATED COUNT: 11.9 % (ref 12.3–15.4)
FERRITIN SERPL-MCNC: 123.9 NG/ML (ref 13–150)
HCT VFR BLD AUTO: 34.8 % (ref 34–46.6)
HGB BLD-MCNC: 11.5 G/DL (ref 12–15.9)
IMM GRANULOCYTES # BLD AUTO: 0.02 10*3/MM3 (ref 0–0.05)
IMM GRANULOCYTES NFR BLD AUTO: 0.3 % (ref 0–0.5)
IRON 24H UR-MRATE: 49 MCG/DL (ref 37–145)
IRON SATN MFR SERPL: 17 % (ref 20–50)
LYMPHOCYTES # BLD AUTO: 1.9 10*3/MM3 (ref 0.7–3.1)
LYMPHOCYTES NFR BLD AUTO: 28.8 % (ref 19.6–45.3)
MCH RBC QN AUTO: 29.3 PG (ref 26.6–33)
MCHC RBC AUTO-ENTMCNC: 33 G/DL (ref 31.5–35.7)
MCV RBC AUTO: 88.5 FL (ref 79–97)
MONOCYTES # BLD AUTO: 0.35 10*3/MM3 (ref 0.1–0.9)
MONOCYTES NFR BLD AUTO: 5.3 % (ref 5–12)
NEUTROPHILS # BLD AUTO: 4.15 10*3/MM3 (ref 1.4–7)
NEUTROPHILS NFR BLD AUTO: 63 % (ref 42.7–76)
NRBC BLD AUTO-RTO: 0 /100 WBC (ref 0–0)
PLATELET # BLD AUTO: 277 10*3/MM3 (ref 140–450)
PMV BLD AUTO: 9.2 FL (ref 6–12)
RBC # BLD AUTO: 3.93 10*6/MM3 (ref 3.77–5.28)
TIBC SERPL-MCNC: 291 MCG/DL (ref 298–536)
TRANSFERRIN SERPL-MCNC: 195 MG/DL (ref 200–360)
WBC NRBC COR # BLD: 6.59 10*3/MM3 (ref 3.4–10.8)

## 2019-03-07 PROCEDURE — 83540 ASSAY OF IRON: CPT | Performed by: INTERNAL MEDICINE

## 2019-03-07 PROCEDURE — 36415 COLL VENOUS BLD VENIPUNCTURE: CPT

## 2019-03-07 PROCEDURE — 85025 COMPLETE CBC W/AUTO DIFF WBC: CPT | Performed by: INTERNAL MEDICINE

## 2019-03-07 PROCEDURE — 84466 ASSAY OF TRANSFERRIN: CPT | Performed by: INTERNAL MEDICINE

## 2019-03-07 PROCEDURE — 82728 ASSAY OF FERRITIN: CPT | Performed by: INTERNAL MEDICINE

## 2019-06-20 ENCOUNTER — OFFICE VISIT (OUTPATIENT)
Dept: ONCOLOGY | Facility: CLINIC | Age: 39
End: 2019-06-20

## 2019-06-20 ENCOUNTER — LAB (OUTPATIENT)
Dept: OTHER | Facility: HOSPITAL | Age: 39
End: 2019-06-20

## 2019-06-20 VITALS
HEIGHT: 65 IN | TEMPERATURE: 98.3 F | WEIGHT: 166.2 LBS | BODY MASS INDEX: 27.69 KG/M2 | RESPIRATION RATE: 14 BRPM | HEART RATE: 82 BPM | OXYGEN SATURATION: 100 % | SYSTOLIC BLOOD PRESSURE: 108 MMHG | DIASTOLIC BLOOD PRESSURE: 74 MMHG

## 2019-06-20 DIAGNOSIS — D50.9 IRON DEFICIENCY ANEMIA, UNSPECIFIED IRON DEFICIENCY ANEMIA TYPE: ICD-10-CM

## 2019-06-20 DIAGNOSIS — I63.9 CEREBROVASCULAR ACCIDENT (CVA), UNSPECIFIED MECHANISM (HCC): ICD-10-CM

## 2019-06-20 DIAGNOSIS — D68.59 HYPERCOAGULATION SYNDROME (HCC): ICD-10-CM

## 2019-06-20 DIAGNOSIS — E53.8 B12 DEFICIENCY: ICD-10-CM

## 2019-06-20 DIAGNOSIS — R00.2 PALPITATION: Primary | ICD-10-CM

## 2019-06-20 LAB
BASOPHILS # BLD AUTO: 0.02 10*3/MM3 (ref 0–0.2)
BASOPHILS NFR BLD AUTO: 0.3 % (ref 0–1.5)
DEPRECATED RDW RBC AUTO: 39.7 FL (ref 37–54)
EOSINOPHIL # BLD AUTO: 0.14 10*3/MM3 (ref 0–0.4)
EOSINOPHIL NFR BLD AUTO: 2.3 % (ref 0.3–6.2)
ERYTHROCYTE [DISTWIDTH] IN BLOOD BY AUTOMATED COUNT: 12.3 % (ref 12.3–15.4)
FERRITIN SERPL-MCNC: 36.8 NG/ML (ref 13–150)
HCT VFR BLD AUTO: 37.8 % (ref 34–46.6)
HGB BLD-MCNC: 12.4 G/DL (ref 12–15.9)
IMM GRANULOCYTES # BLD AUTO: 0.02 10*3/MM3 (ref 0–0.05)
IMM GRANULOCYTES NFR BLD AUTO: 0.3 % (ref 0–0.5)
IRON 24H UR-MRATE: 34 MCG/DL (ref 37–145)
IRON SATN MFR SERPL: 10 % (ref 20–50)
LYMPHOCYTES # BLD AUTO: 1.92 10*3/MM3 (ref 0.7–3.1)
LYMPHOCYTES NFR BLD AUTO: 31.5 % (ref 19.6–45.3)
MCH RBC QN AUTO: 28.8 PG (ref 26.6–33)
MCHC RBC AUTO-ENTMCNC: 32.8 G/DL (ref 31.5–35.7)
MCV RBC AUTO: 87.7 FL (ref 79–97)
MONOCYTES # BLD AUTO: 0.37 10*3/MM3 (ref 0.1–0.9)
MONOCYTES NFR BLD AUTO: 6.1 % (ref 5–12)
NEUTROPHILS # BLD AUTO: 3.62 10*3/MM3 (ref 1.7–7)
NEUTROPHILS NFR BLD AUTO: 59.5 % (ref 42.7–76)
NRBC BLD AUTO-RTO: 0 /100 WBC (ref 0–0.2)
PLATELET # BLD AUTO: 296 10*3/MM3 (ref 140–450)
PMV BLD AUTO: 9 FL (ref 6–12)
RBC # BLD AUTO: 4.31 10*6/MM3 (ref 3.77–5.28)
TIBC SERPL-MCNC: 326 MCG/DL (ref 298–536)
TRANSFERRIN SERPL-MCNC: 219 MG/DL (ref 200–360)
VIT B12 BLD-MCNC: 404 PG/ML (ref 211–946)
WBC NRBC COR # BLD: 6.09 10*3/MM3 (ref 3.4–10.8)

## 2019-06-20 PROCEDURE — 82728 ASSAY OF FERRITIN: CPT | Performed by: INTERNAL MEDICINE

## 2019-06-20 PROCEDURE — 99215 OFFICE O/P EST HI 40 MIN: CPT | Performed by: INTERNAL MEDICINE

## 2019-06-20 PROCEDURE — 82607 VITAMIN B-12: CPT | Performed by: INTERNAL MEDICINE

## 2019-06-20 PROCEDURE — 36415 COLL VENOUS BLD VENIPUNCTURE: CPT

## 2019-06-20 PROCEDURE — 83540 ASSAY OF IRON: CPT | Performed by: INTERNAL MEDICINE

## 2019-06-20 PROCEDURE — 84466 ASSAY OF TRANSFERRIN: CPT | Performed by: INTERNAL MEDICINE

## 2019-06-20 PROCEDURE — 85025 COMPLETE CBC W/AUTO DIFF WBC: CPT | Performed by: INTERNAL MEDICINE

## 2019-06-20 RX ORDER — SODIUM CHLORIDE 9 MG/ML
250 INJECTION, SOLUTION INTRAVENOUS ONCE
Status: CANCELLED | OUTPATIENT
Start: 2019-06-25 | End: 2019-06-27

## 2019-06-20 RX ORDER — DIPHENHYDRAMINE HYDROCHLORIDE 50 MG/ML
50 INJECTION INTRAMUSCULAR; INTRAVENOUS AS NEEDED
Status: CANCELLED | OUTPATIENT
Start: 2019-06-25

## 2019-06-20 RX ORDER — METHYLPREDNISOLONE SODIUM SUCCINATE 125 MG/2ML
125 INJECTION, POWDER, LYOPHILIZED, FOR SOLUTION INTRAMUSCULAR; INTRAVENOUS AS NEEDED
Status: CANCELLED | OUTPATIENT
Start: 2019-06-25

## 2019-06-20 NOTE — PROGRESS NOTES
.     REASON FOR CONSULTATION:     1.  Provide an opinion on any further workup or treatment of anemia and possible blood clots with suspected episodes of strokes.   2.  Laboratory study on 5/31/2018 reported significantly elevated anti-phosphatidylserine IgM at 57, and marginally elevated anticardiolipin IgM 14.  All others were negative.   3.  Laboratory tests confirmed vitamin B12 deficiency and iron deficiency on 5/31/2018.    4.  Repeated laboratory study on 9/13/2018 showed persistently significantly elevated anti-phosphatidylserine IgM at 65.  She also has persistent significant iron deficiency and vitamin B12 deficiency.   5.  Patient was not able to tolerate oral iron due to significant constipation, she was given 2 doses Injectafer in end of September 2018, with significant improved iron panel and normalization of hemoglobin in December 2018.                                    HISTORY OF PRESENT ILLNESS:  The patient is a 39 y.o. year old female who is here for follow-up. The patient is accompanied by her  today.    The patient reports she tolerates Eliquis 2.5 mg every 12 hours. She is on low dose Eliquis because of heavy menses. She continues to have regular cycle but heavy menses lasting for about 8 days. The patient complains of palpitations recently, and was seen by a cardiologist, who prescribed her metoprolol 25 mg, however she has not started it yet.     The patient also reports she has residual symptoms from her previous stroke. She also reports improved symptomology, but still has some residual problem which still interferes with her quality of life.  She was seen by a neurologist associated with [facility], and she prefers to transfer care to a neurologist within the [facility], to get all of her care under one organization. She also requested a cardiologist within the [facility].    The patient reports no fever, no sweating, no chills. Her  performance status is ECOG 1. Her laboratory study today reported normal hemoglobin 12.4, MCV 87.7, normal platelets 296,000 and WBC 6090.    She reports only taking oral iron once a day sometimes but with significant constipation.     The patient reports she is not on oral vitamin B12 supplementation because of side-effects, she reports oral vitamin B12 makes her feeling ill. She increased oral meat intake instead.     Laboratory study for iron study and B12 level are pending currently.       Past Medical History:   Diagnosis Date   • Anemia     B12 anemia during pregnancy in    • Antiphospholipid syndrome (CMS/HCC)    • Blurry vision, bilateral    • Cervical cancer (CMS/HCC) 1965    Stage 0    • H/O HPV in female    • H/O Incomplete RBBB 2013   • H/O Raynaud phenomenon     Probable at age 31 during pregnancy   • History of prior pregnancies     x3   • History of syncope    • MS (multiple sclerosis) (CMS/HCC)    • POTS (postural orthostatic tachycardia syndrome)    • Stroke (CMS/HCC)     Blood clot, age 26   • SVT (supraventricular tachycardia) (CMS/HCC)    • Tattoos      Past Surgical History:   Procedure Laterality Date   • CERVICAL BIOPSY  W/ LOOP ELECTRODE EXCISION       OB/GYN history.  Menarche age 17.  .  No use of birth control pill use.  No IUD.     HEMATOLOGIC/ONCOLOGIC HISTORY:  The patient is a 38 y.o. year old female who is here for initial evaluation with the above-mentioned history on 2018.    Patient presented for evaluation reported by her primary care physician Dr. Bravo because of recurrent episodes of blurry visions.  Patient also has abnormal brain MRI examination on multiple occasions.  She was origin of diagnosis of multiple sclerosis, however was told by urologist Dr. Khanna that she did not have multiple sclerosis.  Most recently patient was seen by a different neurologist Dr. Scott that her MRI images did not fit with typical MS changes, instead it may be changes  related to thrombotic stroke.  This patient has chronic migraine headache, and was taking Topamax low-dose with good results but with side effects and the medicine was recently changed to Trokendl XR.     This patient also has history of iron deficiency for which she has been taking oral iron for many years.  She also had vitamin B12 deficiency.  Her laboratory study on 11/20/2017 clearly reported iron deficiency with ferritin 8 ng/mL, and she was already mildly anemic with hemoglobin 11.3, and MCV 82.9. She had a normal WBC 6700 including neutrophils 3850 and lymphocytes 2190, monocytes 420.  Her platelets was 323,000.     Laboratory study on 3/23/2018 reported hemoglobin 11.6, MCV 83.1 MCHC 33.6.  Her WBC was 7900 including neutrophils 5240, lymphocytes 1980, monocytes 500.  Her platelets were 341,000.  Her iron studies reported free iron 106 TIBC 365 and iron saturation 29%, vitamin B12 at 242 pg/mL and vitamin D 20 ng/mL.  There was no ferritin level nor folic acid level.      Laboratory studies we obtained on 05/31/2018 showed significantly elevated antiphosphatidylserine antibody IgM subtype at 57, but negative for IgG and IgA subtype.  She is also marginally positive for anticardiolipin IgM at 14, but negative for IgG subtype.  She was tested negative for other hypercoagulable tests including anti-Beta-2 glycoprotein 1 antibodies, negative for lupus anticoagulant and normal antithrombin 103%, normal protein C activity 117%, normal protein S activity 81% with free protein S antigen 90%.  She is also negative for factor II mutation and factor V Leiden mutation.  We also checked her for comprehensive rheumatoid panel which is all negative as well as negative for the rheumatoid factor and direct WANG.  She also had iron deficiency, ferritin 12.5, iron saturation 12% 05/31/2018.     The patient presented on 6/14/2018 for reevaluation and to discuss laboratory results obtained on 05/31/2018 when I saw her for the  1st time for evaluation of possible thrombosis as well as persistent anemia.  The patient reports no changes in her clinical condition the past 6 days.  No recent episodes of blurry vision.  She is on low-dose aspirin 81 mg daily.  The patient also has been on low-dose oral iron 28 mg daily with good compliance.  She was not able to tolerate the large dose of ferrous sulfate at 65 mg tablets because of significant constipation.  The patient is not really compliant with oral vitamin B12 supplementation.    We started patient on Eliquis 5 mg twice a day on 6/14/2018.  This patient is likely having antiphospholipid antibody syndrome.    In September 2018, patient reports she has no recurrence of blurry vision, no headaches and dizziness since she started on Eliquis 5 mg twice a day in middle June 2018.  She feels more fit.  Her  also reports patient is more energetic in the past 3 months.  Patient reports no easy bleeding or bruising.  She does note somewhat increased volume of her menses.    Patient reports she was not able to tolerate even low-dose oral iron with significant constipation.  She also reports vitamin B12 causes palpitation.  So currently she is not taking oral iron or vitamin B-12.      Laboratory results on 9/13/2018 reported persistently elevated anti-phosphatidylserine IgM at 65, however resolution of mildly elevated anticardiolipin IgM antibody.  She has persistent iron deficiency with ferritin 8 ng/mL, iron saturation 7%, both of which actually worse than those levels on 5/31/2018.  Her B12 level also is low at 236 pg/mL.  She has worsening anemia with hemoglobin 11.2 but maintains normal WBC 6500 and platelets 292,000.     Patient had IV Injectafer at the end of September and early October.  She reports improved energy level.      Laboratory studies on 12/13/2018 reported normalized hemoglobin at 12.8, MCV 88.0, MCHC 33.4.  She maintains normal platelets at 262,000 and WBC 7670.  Her iron  study showed significantly improved ferritin 206 ng/mL and iron saturation 18%.  Her vitamin B12 level 264 pg/mL.        MEDICATIONS    Current Outpatient Medications:   •  ELIQUIS 5 MG tablet tablet, TAKE ONE TABLET BY MOUTH EVERY 12 HOURS, Disp: 60 tablet, Rfl: 6  •  Ferrous Sulfate (IRON) 325 (65 Fe) MG tablet, Take  by mouth., Disp: , Rfl:   •  metoprolol tartrate (LOPRESSOR) 25 MG tablet, , Disp: , Rfl:   (She takes decreased to Eliquis two-point every 12 hours, is not started on metoprolol).     ALLERGIES:     Allergies   Allergen Reactions   • Codeine Rash     vomiting       SOCIAL HISTORY:       Social History     Social History   • Marital status:      Spouse name: Bebo   • Number of children: 3   • Years of education: College      Occupational History   • , Artist       Social History Main Topics   • Smoking status: Never Smoker   • Smokeless tobacco: Never Used   • Alcohol use Yes      Comment: wine Usually in holidays, less than 1 drink every 2 weeks    • Drug use: No    • Sexual activity: Not on file         FAMILY HISTORY:  Family History   Problem Relation Age of Onset   • Cancer Mother Diagnosed of uterine cancer at age 38 post surgery.  Currently age 64 in 2018 no details.      • Other Mother, Also has chronic anemia.  Gallbladder stone.           Heart murmur   • ADD / ADHD Brother    Family history of strokes in grandmother also had Alzheimer's disease.   Gallbladder stone in mother, grandmother and 3 aunts.     REVIEW OF SYSTEMS:  Review of Systems   Constitutional: Negative for chills, fatigue and fever.   HENT: Negative for congestion, facial swelling and rhinorrhea.    Eyes: Positive for visual disturbance.   Respiratory: Negative for shortness of breath.    Cardiovascular: Positive for palpitations. Negative for chest pain and leg swelling.   Gastrointestinal: Negative for abdominal pain, blood in stool, diarrhea and nausea.   Endocrine: Negative for polyuria.  "  Genitourinary: Negative for dysuria, frequency and hematuria.   Musculoskeletal: Negative for arthralgias, back pain and joint swelling.        Trouble with her hands   Allergic/Immunologic: Negative for immunocompromised state.   Neurological: Negative for dizziness, syncope, speech difficulty, numbness and headaches.   Hematological: Negative for adenopathy. Does not bruise/bleed easily.   Psychiatric/Behavioral: Negative for agitation and confusion.            Vitals:    06/20/19 0902   BP: 108/74   Pulse: 82   Resp: 14   Temp: 98.3 °F (36.8 °C)   SpO2: 100%   Weight: 75.4 kg (166 lb 3.2 oz)   Height: 164 cm (64.57\")  Comment: new ht. without shoes   PainSc:   2   PainLoc: Comment: chest area     Current Status 6/20/2019   ECOG score 0      PHYSICAL EXAM:      CONSTITUTIONAL:  Well-developed and well-nourished female.  No distress, looks comfortable.  EYES:  Conjunctiva and lids unremarkable.  PERRLA  EARS,NOSE,MOUTH,THROAT:  Ears and nose appear unremarkable.  Oral mucosa moist.  Lips appear unremarkable.  RESPIRATORY:  Normal respiratory effort.  Lungs clear to auscultation bilaterally.  CARDIOVASCULAR:  Regular rhythm and rate.  Normal S1, S2.  No murmurs.      GASTROINTESTINAL: Abdomen appears unremarkable.  Nontender.  No hepatomegaly.  No splenomegaly.  Bowel sounds normal.  LYMPHATIC:  No cervical, supraclavicular lymphadenopathy.  MUSCULOSKELETAL:  No significant lower extremity edema.   Unremarkable gait.  No cyanosis or clubbing.  SKIN:  Warm.  No rashes.    PSYCHIATRIC:  Normal judgment and insight.  Normal mood and affect.      RECENT LABS:    Lab Results   Component Value Date    WBC 6.09 06/20/2019    HGB 12.4 06/20/2019    HCT 37.8 06/20/2019    MCV 87.7 06/20/2019     06/20/2019     Lab Results   Component Value Date    NEUTROABS 3.62 06/20/2019       Lab Results   Component Value Date    IRON 34 (L) 06/20/2019    TIBC 326 06/20/2019    FERRITIN 36.80 06/20/2019   Iron saturation " 7%        Assessment/Plan      1.  Antiphospholipid antibody syndrome, with significantly anti-phosphatidylserine IgM antibody twice more than 3 months apart.  This patient had symptoms with blurry vision, bilateral, ? Cerebrovascular accident (CVA).  This patient has multiple major complaints, and was thought related to multiple sclerosis, however she was told by 2 different neurologists that the MRI changes did not fit with multiple sclerosis.  Instead it may be more of stroke.  This patient also has intermittent lower extremity edema although no previous evidence of venous thrombosis.     Her laboratory studies in May 2018 reported significantly elevated antiphosphatidylserine antibody IgM at 57.  We repeated laboratory study on 9/13/2018, she had persistently elevated anti-phosphatidylserine antibody IgM 65.  So she has antiphospholipid syndrome.      Since we started her on Eliquis 5 mg twice a day in middle June 2018, she initially reports resolution of blurry vision.  However now she claims she has recurrent pleural effusion from time to time.   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       Patients who have reevaluation by neurologist.  However she does not want to go back to her previous neurologist..  So I will refer patient to neurologist at [facility].     2.  Iron deficiency anemia.  Patient previously was not able to tolerate high-dose ferrous sulfate because of significant constipation.  So she switch her to low-dose oral iron once a day for a few years with good compliance.  In July because of recurrent on deficiency, I asked to increase to 3 times a day however she is not able to tolerate it because of constipation problem.      We repeated her laboratory study again on 9/13/2018, she actually has worsening iron saturation and ferritin level.  She also reports somewhat increased menstrual volume since she is  being on oral Eliquis for anticoagulation.      With the above reason, we treated her with 2 doses of intravenous iron with Injectofar in end of September and early October 2018.  Patient has great response.  She reports only taking oral iron once a day sometimes but with significant constipation.     3.  B12 deficiency.  This patient has history of B12 deficiency and was given B12 injection previously.  However she has not received B12 in the past couple of years.  I instructed her to restart taking oral vitamin B12, however she reported that vitamin B12 causes palpitation. Patient tried again but still not able to tolerate.     As I discussed with her previously, we would try to avoid using vitamin B12 intramuscular injection because of the risk of intramuscular hematoma as she is on anticoagulation.     We are waiting for her vitamin B12 level.        PLAN:      1.  Continue Eliquis 2.5 mg twice a day.  She needs to be cautious for injury or bleeding.   2.  Waiting for lab results of iron profile ferritin and vitamin B12.  I will call her with results.  3.  Refer patient for neurologic consultation at the [facility].   4.  Consult cardiology service for evaluation of palpitation and management.   5.  Patient will return in 6 months for M.D. Evaluation, we'll repeat laboratory studies for CBC, ferritin, iron profile and vitamin B12 level.    Addendum:   Lab Results   Component Value Date    [id number] 06/20/2019     Lab Results   Component Value Date    IRON 34 (L) 06/20/2019    TIBC 326 06/20/2019    FERRITIN 36.80 06/20/2019   Iron saturation 10% on 6/20/2019.    Lab results returned after she left the clinic.  I called and spoke to patient, recommended IV iron infusion again with Injectafer weekly for 2 doses, she has recurrent iron deficiency, most likely due to her heavy menses.  Patient is not able to tolerate oral iron.  Her vitamin B12 level has improved, she will  continue current management, she could try sublingual vitamin B12 if she desires.        CRISTIAN YANG M.D., Ph.D.    6/20/2019.      CC: Marii Bravo MD        Dictated using Dragon Dictation.

## 2019-06-24 RX ORDER — PROCHLORPERAZINE MALEATE 10 MG
10 TABLET ORAL ONCE
Status: CANCELLED
Start: 2019-06-25 | End: 2019-06-25

## 2019-06-24 NOTE — PROGRESS NOTES
Compazine 10 mg PO added to therapy plan for each dose of Injectafer as premedication, per protocol Dr. Burgos

## 2019-06-25 ENCOUNTER — INFUSION (OUTPATIENT)
Dept: ONCOLOGY | Facility: HOSPITAL | Age: 39
End: 2019-06-25

## 2019-06-25 VITALS
HEART RATE: 78 BPM | WEIGHT: 167.6 LBS | BODY MASS INDEX: 28.27 KG/M2 | DIASTOLIC BLOOD PRESSURE: 63 MMHG | OXYGEN SATURATION: 99 % | SYSTOLIC BLOOD PRESSURE: 102 MMHG | TEMPERATURE: 98.4 F

## 2019-06-25 DIAGNOSIS — IMO0001 ADVERSE EFFECT OF IRON OR ITS COMPOUND, INITIAL ENCOUNTER: ICD-10-CM

## 2019-06-25 DIAGNOSIS — D50.9 IRON DEFICIENCY ANEMIA, UNSPECIFIED IRON DEFICIENCY ANEMIA TYPE: Primary | ICD-10-CM

## 2019-06-25 PROCEDURE — 25010000002 FERRIC CARBOXYMALTOSE 750 MG/15ML SOLUTION 15 ML VIAL: Performed by: INTERNAL MEDICINE

## 2019-06-25 PROCEDURE — 96374 THER/PROPH/DIAG INJ IV PUSH: CPT | Performed by: INTERNAL MEDICINE

## 2019-06-25 RX ORDER — PROCHLORPERAZINE MALEATE 10 MG
10 TABLET ORAL ONCE
Status: CANCELLED
Start: 2019-06-25 | End: 2019-06-25

## 2019-06-25 RX ORDER — SODIUM CHLORIDE 9 MG/ML
250 INJECTION, SOLUTION INTRAVENOUS ONCE
Status: COMPLETED | OUTPATIENT
Start: 2019-06-25 | End: 2019-06-25

## 2019-06-25 RX ORDER — DIPHENHYDRAMINE HYDROCHLORIDE 50 MG/ML
50 INJECTION INTRAMUSCULAR; INTRAVENOUS AS NEEDED
Status: CANCELLED | OUTPATIENT
Start: 2019-06-25

## 2019-06-25 RX ORDER — SODIUM CHLORIDE 9 MG/ML
250 INJECTION, SOLUTION INTRAVENOUS ONCE
Status: CANCELLED | OUTPATIENT
Start: 2019-06-25 | End: 2019-06-25

## 2019-06-25 RX ORDER — METHYLPREDNISOLONE SODIUM SUCCINATE 125 MG/2ML
125 INJECTION, POWDER, LYOPHILIZED, FOR SOLUTION INTRAMUSCULAR; INTRAVENOUS AS NEEDED
Status: CANCELLED | OUTPATIENT
Start: 2019-06-25

## 2019-06-25 RX ORDER — PROCHLORPERAZINE MALEATE 10 MG
10 TABLET ORAL ONCE
Status: DISCONTINUED | OUTPATIENT
Start: 2019-06-25 | End: 2019-06-25 | Stop reason: HOSPADM

## 2019-06-25 RX ADMIN — SODIUM CHLORIDE 250 ML: 900 INJECTION, SOLUTION INTRAVENOUS at 14:12

## 2019-06-25 RX ADMIN — FERRIC CARBOXYMALTOSE INJECTION 750 MG: 50 INJECTION, SOLUTION INTRAVENOUS at 14:12

## 2019-07-02 ENCOUNTER — INFUSION (OUTPATIENT)
Dept: ONCOLOGY | Facility: HOSPITAL | Age: 39
End: 2019-07-02

## 2019-07-02 VITALS
BODY MASS INDEX: 28.4 KG/M2 | OXYGEN SATURATION: 100 % | TEMPERATURE: 98.6 F | HEART RATE: 77 BPM | DIASTOLIC BLOOD PRESSURE: 69 MMHG | RESPIRATION RATE: 14 BRPM | WEIGHT: 168.4 LBS | SYSTOLIC BLOOD PRESSURE: 102 MMHG

## 2019-07-02 DIAGNOSIS — IMO0001 ADVERSE EFFECT OF IRON OR ITS COMPOUND, INITIAL ENCOUNTER: ICD-10-CM

## 2019-07-02 DIAGNOSIS — D50.9 IRON DEFICIENCY ANEMIA, UNSPECIFIED IRON DEFICIENCY ANEMIA TYPE: Primary | ICD-10-CM

## 2019-07-02 PROCEDURE — 96374 THER/PROPH/DIAG INJ IV PUSH: CPT | Performed by: INTERNAL MEDICINE

## 2019-07-02 PROCEDURE — 25010000002 FERRIC CARBOXYMALTOSE 750 MG/15ML SOLUTION 15 ML VIAL: Performed by: INTERNAL MEDICINE

## 2019-07-02 RX ORDER — METHYLPREDNISOLONE SODIUM SUCCINATE 125 MG/2ML
125 INJECTION, POWDER, LYOPHILIZED, FOR SOLUTION INTRAMUSCULAR; INTRAVENOUS AS NEEDED
Status: CANCELLED | OUTPATIENT
Start: 2019-07-02

## 2019-07-02 RX ORDER — PROCHLORPERAZINE MALEATE 10 MG
10 TABLET ORAL ONCE
Status: DISCONTINUED | OUTPATIENT
Start: 2019-07-02 | End: 2019-07-02 | Stop reason: HOSPADM

## 2019-07-02 RX ORDER — PROCHLORPERAZINE MALEATE 10 MG
10 TABLET ORAL ONCE
Status: CANCELLED
Start: 2019-07-02 | End: 2019-07-02

## 2019-07-02 RX ORDER — SODIUM CHLORIDE 9 MG/ML
250 INJECTION, SOLUTION INTRAVENOUS ONCE
Status: CANCELLED | OUTPATIENT
Start: 2019-07-02 | End: 2019-07-02

## 2019-07-02 RX ORDER — DIPHENHYDRAMINE HYDROCHLORIDE 50 MG/ML
50 INJECTION INTRAMUSCULAR; INTRAVENOUS AS NEEDED
Status: CANCELLED | OUTPATIENT
Start: 2019-07-02

## 2019-07-02 RX ORDER — SODIUM CHLORIDE 9 MG/ML
250 INJECTION, SOLUTION INTRAVENOUS ONCE
Status: COMPLETED | OUTPATIENT
Start: 2019-07-02 | End: 2019-07-02

## 2019-07-02 RX ADMIN — SODIUM CHLORIDE 250 ML: 900 INJECTION, SOLUTION INTRAVENOUS at 14:05

## 2019-07-02 RX ADMIN — FERRIC CARBOXYMALTOSE INJECTION 750 MG: 50 INJECTION, SOLUTION INTRAVENOUS at 14:05

## 2019-07-17 ENCOUNTER — TELEPHONE (OUTPATIENT)
Dept: ONCOLOGY | Facility: CLINIC | Age: 39
End: 2019-07-17

## 2019-07-17 NOTE — TELEPHONE ENCOUNTER
Pt. States she has APS and she received a message that pts who have this diagnosis should not take eliquis.  There is black box warning concerning the drug and that diagnosis.  Pt. Has been on eliquis for over a year and she hasn't had any problems.  Informed pt. Dr. otoole is out of the office until 7/29/19.  Pt. Doesn't want me to take this concern to another md.   States she is comfortable waiting for dr. otoole to return.  States she is not having any issues.  Pt. Instructed to call if she changes her mind and she wants me to run it by another physician.  Understanding noted.

## 2019-07-17 NOTE — TELEPHONE ENCOUNTER
----- Message from Katarzyna Thompson sent at 7/17/2019  9:08 AM EDT -----  Contact: [phone]  Pt is calling about the warning about taking Eliquis

## 2019-07-30 ENCOUNTER — TELEPHONE (OUTPATIENT)
Dept: ONCOLOGY | Facility: CLINIC | Age: 39
End: 2019-07-30

## 2019-07-30 NOTE — TELEPHONE ENCOUNTER
Informed pt. I did s/w dr. otoole about pt. Receiving a black box warning concerning pts with the dx of APS shouldn't be taking eliquis.   Informed pt. That dr. otoole feels  it is safe for her to be taking it.  Pt. Denies having any issues with it.  Pt. Instructed to call for any problems.  understanding noted.

## 2019-08-01 ENCOUNTER — OFFICE VISIT (OUTPATIENT)
Dept: CARDIOLOGY | Facility: CLINIC | Age: 39
End: 2019-08-01

## 2019-08-01 VITALS
WEIGHT: 168 LBS | DIASTOLIC BLOOD PRESSURE: 60 MMHG | HEIGHT: 64 IN | BODY MASS INDEX: 28.68 KG/M2 | SYSTOLIC BLOOD PRESSURE: 98 MMHG | HEART RATE: 68 BPM

## 2019-08-01 DIAGNOSIS — R00.2 PALPITATION: Primary | ICD-10-CM

## 2019-08-01 PROCEDURE — 93000 ELECTROCARDIOGRAM COMPLETE: CPT | Performed by: INTERNAL MEDICINE

## 2019-08-01 PROCEDURE — 99204 OFFICE O/P NEW MOD 45 MIN: CPT | Performed by: INTERNAL MEDICINE

## 2019-08-01 RX ORDER — ACYCLOVIR 400 MG/1
400 TABLET ORAL
COMMUNITY
End: 2021-01-29

## 2019-08-01 NOTE — PROGRESS NOTES
Date of Office Visit: 2019  Encounter Provider: Deborah Riggins MD  Place of Service: [facility] CARDIOLOGY  Patient Name: Sharyn Jones  :1980    Chief complaint  Consult requested by Dr. Bravo for evaluation of palpitations, shortness of breath with history of supraventricular tachycardia    History of Present Illness  Patient is a 39-year-old female with history of stroke, autonomic dysfunction antiphospholipid syndrome, Raynaud's syndrome multiple sclerosis, POTs and supraventricular tachycardia.  She has a history of supraventricular tachycardia dating back to .  She had deferred treatment with calcium channel blockers and more recently beta-blockers.  A treadmill exercise stress test was negative that was negative.  A tilt table was positive for vasovagal syncope in . In  she was seen by [facility] for mild bradycardia.  Echocardiogram was normal.  Holter showed one episode of supraventricular tachycardia no significant bradycardia arrhythmia she was subsequently seen by Dr. Gayle though it is not clear that she did this.  I have no records.    Got a year ago she had a stroke with vision loss and slurred speech and paralysis.  She was eventually found to have antiphospholipid syndrome and has been on Eliquis since then.  Her dose has been modified due to anemia felt to be due to heavy bleeding with menses.  She states that she has had a fair number of palpitations but since starting Eliquis a year ago this had improved though 1 month ago she had 3 to 4 days of pressure type sensation associated with palpitations that occurred intermittently throughout the day.  She was short of breath with this she did not seek any medical attention.  She eventually was seen by Dr. Keyes and had an echocardiogram and EKG that he told her was normal.  She was noted to be anemic and was given iron infusions and subsequently had improvement of her symptoms.   She now has palpitations to sporadically throughout the day once or twice.  They lasts for few seconds or not bothersome to him back to her baseline.  She is otherwise active walking a mile every morning and denies any exertional chest pain, shortness of breath, palpitations syncope near syncope.  She does have episodes of dizziness associated with hypotension and has learned to modify her activities accordingly.  She also has liberalized her salt and wear support stockings which seem to help.    Past Medical History:   Diagnosis Date   • Anemia     B12 anemia during pregnancy in 2014   • Antiphospholipid syndrome (CMS/HCC)    • Autonomic dysfunction    • Blurry vision, bilateral    • Cervical cancer (CMS/HCC) 1965    Stage 0    • H/O HPV in female    • H/O Incomplete RBBB 09/2013   • H/O Raynaud phenomenon     Probable at age 31 during pregnancy   • History of prior pregnancies     x3   • History of syncope    • MS (multiple sclerosis) (CMS/HCC)    • Palpitation    • POTS (postural orthostatic tachycardia syndrome) 2014   • Stroke (CMS/[facility])     Blood clot, age 26   • SVT (supraventricular tachycardia) (CMS/[facility])    • Tattoos      Past Surgical History:   Procedure Laterality Date   • CERVICAL BIOPSY  W/ LOOP ELECTRODE EXCISION       Outpatient Medications Prior to Visit   Medication Sig Dispense Refill   • acyclovir (ZOVIRAX) 400 MG tablet Take 400 mg by mouth Every 4 (Four) Hours While Awake. Take no more than 5 doses a day.     • ELIQUIS 5 MG tablet tablet TAKE ONE TABLET BY MOUTH EVERY 12 HOURS 60 tablet 6   • Ferrous Sulfate (IRON) 325 (65 Fe) MG tablet Take  by mouth.     • metoprolol tartrate (LOPRESSOR) 25 MG tablet        No facility-administered medications prior to visit.        Allergies as of 08/01/2019 - Reviewed 08/01/2019   Allergen Reaction Noted   • Codeine Rash 03/18/2018     Social History     Socioeconomic History   • Marital status:      Spouse name: Bebo   • Number of children: 3   •  "Years of education: College   • Highest education level: Not on file   Occupational History   • Occupation:    Tobacco Use   • Smoking status: Never Smoker   • Smokeless tobacco: Never Used   Substance and Sexual Activity   • Alcohol use: Yes     Comment: wine   • Drug use: No     Family History   Problem Relation Age of Onset   • Other Mother         Heart murmur   • Uterine cancer Mother 38   • ADD / ADHD Brother    • Stroke Other    • Alzheimer's disease Other      Review of Systems   Constitution: Negative for fever, malaise/fatigue, weight gain and weight loss.   HENT: Negative for ear pain, hearing loss, nosebleeds and sore throat.    Eyes: Positive for blurred vision. Negative for double vision, pain, vision loss in left eye and vision loss in right eye.   Cardiovascular: Positive for dyspnea on exertion and leg swelling.        See history of present illness.   Respiratory: Positive for cough and shortness of breath. Negative for sleep disturbances due to breathing, snoring and wheezing.    Endocrine: Positive for heat intolerance. Negative for cold intolerance and polyuria.   Skin: Negative for itching, poor wound healing and rash.   Musculoskeletal: Negative for joint pain, joint swelling and myalgias.   Gastrointestinal: Negative for abdominal pain, diarrhea, hematochezia, nausea and vomiting.   Genitourinary: Negative for hematuria and hesitancy.   Neurological: Positive for paresthesias. Negative for numbness and seizures.   Psychiatric/Behavioral: Negative for depression. The patient is not nervous/anxious.         Objective:     Vitals:    08/01/19 1145 08/01/19 1146   BP: 104/60 98/60   BP Location: Left arm Right arm   Pulse: 68    Weight: 76.2 kg (168 lb)    Height: 162.6 cm (64\")      Body mass index is 28.84 kg/m².    Physical Exam   Constitutional: She is oriented to person, place, and time. She appears well-developed and well-nourished.   HENT:   Head: Normocephalic.   Nose: Nose " normal.   Mouth/Throat: Oropharynx is clear and moist.   Eyes: Conjunctivae and EOM are normal. Pupils are equal, round, and reactive to light. Right eye exhibits no discharge. No scleral icterus.   Neck: Normal range of motion. Neck supple. No JVD present. No thyromegaly present.   Cardiovascular: Normal rate, regular rhythm, normal heart sounds and intact distal pulses. Exam reveals no gallop and no friction rub.   No murmur heard.  Pulses:       Carotid pulses are 2+ on the right side, and 2+ on the left side.       Radial pulses are 2+ on the right side, and 2+ on the left side.        Femoral pulses are 2+ on the right side, and 2+ on the left side.       Popliteal pulses are 2+ on the right side, and 2+ on the left side.        Dorsalis pedis pulses are 2+ on the right side, and 2+ on the left side.        Posterior tibial pulses are 2+ on the right side, and 2+ on the left side.   Pulmonary/Chest: Effort normal and breath sounds normal. No respiratory distress. She has no wheezes. She has no rales.   Abdominal: Soft. Bowel sounds are normal. She exhibits no distension. There is no hepatosplenomegaly. There is no tenderness. There is no rebound.   Musculoskeletal: Normal range of motion. She exhibits no edema or tenderness.   Neurological: She is alert and oriented to person, place, and time.   Skin: Skin is warm and dry. No rash noted. No erythema.   Psychiatric: She has a normal mood and affect. Her behavior is normal. Judgment and thought content normal.   Vitals reviewed.    Lab Review:     ECG 12 Lead  Date/Time: 8/1/2019 11:47 AM  Performed by: Deborah Riggins MD  Authorized by: Deborah Riggins MD   Comparison: compared with previous ECG   Similar to previous ECG  Rhythm: sinus rhythm  Other findings: poor R wave progression    Clinical impression: abnormal EKG          Assessment:       Diagnosis Plan   1. Palpitation  ECG 12 Lead     Plan:       1.  Palpitations.  Now back to baseline.  Will get a copy of  the echocardiogram and EKG from Dr. Keyes's office.  For now as her symptoms are back to baseline we will continue with the current regimen if they recur she will call and we will get an EKG and place a Ziopatch for 2 weeks.  At this point it is unclear if her palpitations represent SVT but likely does.  We also discussed up on the box treatment option but at this point her symptoms have abated and will observe for now.  Would like to document arrhythmia if they recur prior to further treatment.  2.  Paroxysmal supraventricular tachycardia  3.  Prior stroke (not multiple sclerosis)  4.  Chronic migraine headaches.  She denies any symptoms of sleep apnea  5.  Antiphospholipid syndrome. Followed by Dr. Tobar  6.  Chronic anticoagulant therapy started in June 2018  7.  Chronic iron deficiency anemia with heavy menses  8.  B12 deficiency  9.  Questionable gravid state.  She missed one.  And wonders if she is pregnant.  I have encouraged her to check this soon and let Dr. Orellana know as anticoagulation may need to be modified accordingly.     Your medication list           Accurate as of 8/1/19 11:59 PM. If you have any questions, ask your nurse or doctor.               CONTINUE taking these medications      Instructions Last Dose Given Next Dose Due   acyclovir 400 MG tablet  Commonly known as:  ZOVIRAX      Take 400 mg by mouth Every 4 (Four) Hours While Awake. Take no more than 5 doses a day.       ELIQUIS 5 MG tablet tablet  Generic drug:  apixaban      TAKE ONE TABLET BY MOUTH EVERY 12 HOURS       Iron 325 (65 Fe) MG tablet      Take  by mouth.          STOP taking these medications    metoprolol tartrate 25 MG tablet  Commonly known as:  LOPRESSOR  Stopped by:  Deborah Riggins MD             Dictated utilizing Dragon dictation

## 2019-09-24 ENCOUNTER — TELEPHONE (OUTPATIENT)
Dept: CARDIOLOGY | Facility: CLINIC | Age: 39
End: 2019-09-24

## 2019-09-24 DIAGNOSIS — G45.9 TIA (TRANSIENT ISCHEMIC ATTACK): Primary | ICD-10-CM

## 2019-09-24 NOTE — TELEPHONE ENCOUNTER
Info to [facility] and they will work on getting this taken care of.    Jose Luis- Can you please make sure this pt is on Dr Riggins's schedule for Thursday at 8:30 and call to let the pt know.

## 2019-09-24 NOTE — TELEPHONE ENCOUNTER
Received a note faxed from Dr Khanna re: pt had a TIA on 9/19/2019 and needs to have a SHAKILA.  When do you want to see her?  I have uploaded the note in Epic.

## 2019-09-24 NOTE — TELEPHONE ENCOUNTER
See if she can see me on Thursday at 815 or 830 as a follow-up visit.  Need the echocardiogram that was done this past year at [facility] (need disc that we previously asked for) and also then arrange for me to do a SHAKILA on her at 7 AM on this Friday (my CEC day) at the office.pedro luis

## 2019-09-25 ENCOUNTER — TRANSCRIBE ORDERS (OUTPATIENT)
Dept: CARDIOLOGY | Facility: CLINIC | Age: 39
End: 2019-09-25

## 2019-09-25 DIAGNOSIS — Z13.6 SCREENING FOR ISCHEMIC HEART DISEASE: ICD-10-CM

## 2019-09-25 DIAGNOSIS — Z01.810 PRE-OPERATIVE CARDIOVASCULAR EXAMINATION: Primary | ICD-10-CM

## 2019-09-25 NOTE — TELEPHONE ENCOUNTER
Catie- Can you make sure this pt gets on Dr Riggins's schedule for tomorrow at 8:30 for a followup per Dr Riggins.  Please call the pt.      Thank you Phani!

## 2019-09-25 NOTE — TELEPHONE ENCOUNTER
Patient has been scheduled for SHAKILA on 9/27/19   [place] [facility] [place] been called with instructions.   trm

## 2019-09-26 ENCOUNTER — OFFICE VISIT (OUTPATIENT)
Dept: CARDIOLOGY | Facility: CLINIC | Age: 39
End: 2019-09-26

## 2019-09-26 VITALS
HEART RATE: 66 BPM | HEIGHT: 64 IN | BODY MASS INDEX: 28.82 KG/M2 | WEIGHT: 168.8 LBS | SYSTOLIC BLOOD PRESSURE: 100 MMHG | DIASTOLIC BLOOD PRESSURE: 60 MMHG

## 2019-09-26 DIAGNOSIS — D68.59 HYPERCOAGULATION SYNDROME (HCC): ICD-10-CM

## 2019-09-26 DIAGNOSIS — G45.9 TIA (TRANSIENT ISCHEMIC ATTACK): Primary | ICD-10-CM

## 2019-09-26 DIAGNOSIS — I63.9 CEREBROVASCULAR ACCIDENT (CVA), UNSPECIFIED MECHANISM (HCC): ICD-10-CM

## 2019-09-26 DIAGNOSIS — R00.2 PALPITATION: ICD-10-CM

## 2019-09-26 DIAGNOSIS — R00.2 PALPITATIONS: ICD-10-CM

## 2019-09-26 PROCEDURE — 93000 ELECTROCARDIOGRAM COMPLETE: CPT | Performed by: INTERNAL MEDICINE

## 2019-09-26 PROCEDURE — 99215 OFFICE O/P EST HI 40 MIN: CPT | Performed by: INTERNAL MEDICINE

## 2019-09-26 RX ORDER — UBIDECARENONE 75 MG
50 CAPSULE ORAL DAILY
COMMUNITY

## 2019-09-26 NOTE — TELEPHONE ENCOUNTER
I saw patient today and they prefer to do SHAKILA next Tuesday instead of tomorrow.  Please arrange and okay to do at the hospital after 8 AM., pedro luis

## 2019-09-29 DIAGNOSIS — G45.9 TIA (TRANSIENT ISCHEMIC ATTACK): Primary | ICD-10-CM

## 2019-10-01 ENCOUNTER — HOSPITAL ENCOUNTER (OUTPATIENT)
Dept: CARDIOLOGY | Facility: HOSPITAL | Age: 39
Discharge: HOME | End: 2019-10-01
Admitting: INTERNAL MEDICINE

## 2019-10-01 VITALS
SYSTOLIC BLOOD PRESSURE: 102 MMHG | OXYGEN SATURATION: 98 % | RESPIRATION RATE: 16 BRPM | BODY MASS INDEX: 28.68 KG/M2 | DIASTOLIC BLOOD PRESSURE: 61 MMHG | HEIGHT: 64 IN | WEIGHT: 168 LBS | HEART RATE: 83 BPM

## 2019-10-01 DIAGNOSIS — G45.9 TIA (TRANSIENT ISCHEMIC ATTACK): ICD-10-CM

## 2019-10-01 PROCEDURE — 25010000002 MIDAZOLAM PER 1 MG: Performed by: INTERNAL MEDICINE

## 2019-10-01 PROCEDURE — 93325 DOPPLER ECHO COLOR FLOW MAPG: CPT | Performed by: INTERNAL MEDICINE

## 2019-10-01 PROCEDURE — 99152 MOD SED SAME PHYS/QHP 5/>YRS: CPT

## 2019-10-01 PROCEDURE — 93320 DOPPLER ECHO COMPLETE: CPT | Performed by: INTERNAL MEDICINE

## 2019-10-01 PROCEDURE — 93325 DOPPLER ECHO COLOR FLOW MAPG: CPT

## 2019-10-01 PROCEDURE — 93312 ECHO TRANSESOPHAGEAL: CPT

## 2019-10-01 PROCEDURE — 93312 ECHO TRANSESOPHAGEAL: CPT | Performed by: INTERNAL MEDICINE

## 2019-10-01 PROCEDURE — 93320 DOPPLER ECHO COMPLETE: CPT

## 2019-10-01 RX ORDER — SODIUM CHLORIDE 9 MG/ML
INJECTION, SOLUTION INTRAVENOUS
Status: COMPLETED | OUTPATIENT
Start: 2019-10-01 | End: 2019-10-01

## 2019-10-01 RX ORDER — LIDOCAINE HYDROCHLORIDE 20 MG/ML
SOLUTION OROPHARYNGEAL
Status: COMPLETED | OUTPATIENT
Start: 2019-10-01 | End: 2019-10-01

## 2019-10-01 RX ORDER — MIDAZOLAM HYDROCHLORIDE 1 MG/ML
INJECTION INTRAMUSCULAR; INTRAVENOUS
Status: COMPLETED | OUTPATIENT
Start: 2019-10-01 | End: 2019-10-01

## 2019-10-01 RX ADMIN — BENZOCAINE, BUTAMBEN, AND TETRACAINE HYDROCHLORIDE 1 SPRAY: .028; .004; .004 AEROSOL, SPRAY TOPICAL at 10:28

## 2019-10-01 RX ADMIN — SODIUM CHLORIDE 50 ML/HR: 9 INJECTION, SOLUTION INTRAVENOUS at 10:13

## 2019-10-01 RX ADMIN — LIDOCAINE HYDROCHLORIDE 10 ML: 20 SOLUTION ORAL; TOPICAL at 10:14

## 2019-10-01 RX ADMIN — MIDAZOLAM HYDROCHLORIDE 1 MG: 1 INJECTION, SOLUTION INTRAMUSCULAR; INTRAVENOUS at 10:34

## 2019-10-01 RX ADMIN — MIDAZOLAM HYDROCHLORIDE 1 MG: 1 INJECTION, SOLUTION INTRAMUSCULAR; INTRAVENOUS at 10:37

## 2019-10-02 LAB
BH CV ECHO MEAS - BSA(HAYCOCK): 1.9 M^2
BH CV ECHO MEAS - BSA: 1.8 M^2
BH CV ECHO MEAS - BZI_BMI: 28.8 KILOGRAMS/M^2
BH CV ECHO MEAS - BZI_METRIC_HEIGHT: 162.6 CM
BH CV ECHO MEAS - BZI_METRIC_WEIGHT: 76.2 KG
BH CV VAS BP LEFT ARM: NORMAL MMHG
LV EF 2D ECHO EST: 65 %

## 2019-10-06 PROBLEM — G45.9 TIA (TRANSIENT ISCHEMIC ATTACK): Status: ACTIVE | Noted: 2019-10-06

## 2019-10-10 ENCOUNTER — TELEPHONE (OUTPATIENT)
Dept: CARDIOLOGY | Facility: CLINIC | Age: 39
End: 2019-10-10

## 2019-10-22 RX ORDER — APIXABAN 5 MG/1
TABLET, FILM COATED ORAL
Qty: 60 TABLET | Refills: 1 | Status: SHIPPED | OUTPATIENT
Start: 2019-10-22 | End: 2020-01-08

## 2019-10-22 NOTE — TELEPHONE ENCOUNTER
The patient regarding test results.  She has not had any more palpitations after starting B12 in fact feels quite well.  She notes that she missed 2 doses of Eliquis before the episode of TIA in September 2019.  I reviewed with her that it is unlikely that TIAs occurred from her arrhythmia entheses brief SVT) though antiphospholipid syndrome and PFO may be more likely culprits.  She will discuss the dosing of Eliquis with Dr. Khanna.  She will also discuss need for bridging with Dr. Khanna for future procedures.

## 2019-11-11 NOTE — PROGRESS NOTES
Patient with complaints of vaginal bleeding . Reviewed with Dr Joya and  Patient instructed to hold eliquis today and Friday and then resume at 2.5mg twice daily. Also  Instructed to call for any further problems with bleeding . Verbalized understanding.    Green (Altered Mental Status/Behavior)

## 2019-12-19 ENCOUNTER — APPOINTMENT (OUTPATIENT)
Dept: ONCOLOGY | Facility: CLINIC | Age: 39
End: 2019-12-19

## 2019-12-19 ENCOUNTER — APPOINTMENT (OUTPATIENT)
Dept: OTHER | Facility: HOSPITAL | Age: 39
End: 2019-12-19

## 2020-01-08 RX ORDER — APIXABAN 5 MG/1
TABLET, FILM COATED ORAL
Qty: 60 TABLET | Refills: 0 | Status: SHIPPED | OUTPATIENT
Start: 2020-01-08 | End: 2020-02-24 | Stop reason: SDUPTHER

## 2020-01-23 ENCOUNTER — OFFICE VISIT (OUTPATIENT)
Dept: ONCOLOGY | Facility: CLINIC | Age: 40
End: 2020-01-23

## 2020-01-23 ENCOUNTER — LAB (OUTPATIENT)
Dept: OTHER | Facility: HOSPITAL | Age: 40
End: 2020-01-23

## 2020-01-23 VITALS
DIASTOLIC BLOOD PRESSURE: 76 MMHG | WEIGHT: 170.3 LBS | SYSTOLIC BLOOD PRESSURE: 115 MMHG | HEART RATE: 93 BPM | TEMPERATURE: 98.1 F | OXYGEN SATURATION: 97 % | RESPIRATION RATE: 16 BRPM | BODY MASS INDEX: 28.37 KG/M2 | HEIGHT: 65 IN

## 2020-01-23 DIAGNOSIS — I63.9 CEREBROVASCULAR ACCIDENT (CVA), UNSPECIFIED MECHANISM (HCC): ICD-10-CM

## 2020-01-23 DIAGNOSIS — D68.59 HYPERCOAGULATION SYNDROME (HCC): Primary | ICD-10-CM

## 2020-01-23 DIAGNOSIS — D50.9 IRON DEFICIENCY ANEMIA, UNSPECIFIED IRON DEFICIENCY ANEMIA TYPE: ICD-10-CM

## 2020-01-23 DIAGNOSIS — E53.8 B12 DEFICIENCY: ICD-10-CM

## 2020-01-23 DIAGNOSIS — D50.0 IRON DEFICIENCY ANEMIA DUE TO CHRONIC BLOOD LOSS: ICD-10-CM

## 2020-01-23 LAB
BASOPHILS # BLD AUTO: 0.04 10*3/MM3 (ref 0–0.2)
BASOPHILS NFR BLD AUTO: 0.4 % (ref 0–1.5)
DEPRECATED RDW RBC AUTO: 38.9 FL (ref 37–54)
EOSINOPHIL # BLD AUTO: 0.13 10*3/MM3 (ref 0–0.4)
EOSINOPHIL NFR BLD AUTO: 1.3 % (ref 0.3–6.2)
ERYTHROCYTE [DISTWIDTH] IN BLOOD BY AUTOMATED COUNT: 12.2 % (ref 12.3–15.4)
FERRITIN SERPL-MCNC: 284.6 NG/ML (ref 13–150)
HCT VFR BLD AUTO: 37.3 % (ref 34–46.6)
HGB BLD-MCNC: 12.4 G/DL (ref 12–15.9)
IMM GRANULOCYTES # BLD AUTO: 0.03 10*3/MM3 (ref 0–0.05)
IMM GRANULOCYTES NFR BLD AUTO: 0.3 % (ref 0–0.5)
IRON 24H UR-MRATE: 59 MCG/DL (ref 37–145)
IRON SATN MFR SERPL: 21 % (ref 20–50)
LYMPHOCYTES # BLD AUTO: 2.5 10*3/MM3 (ref 0.7–3.1)
LYMPHOCYTES NFR BLD AUTO: 25.9 % (ref 19.6–45.3)
MCH RBC QN AUTO: 29.2 PG (ref 26.6–33)
MCHC RBC AUTO-ENTMCNC: 33.2 G/DL (ref 31.5–35.7)
MCV RBC AUTO: 88 FL (ref 79–97)
MONOCYTES # BLD AUTO: 0.58 10*3/MM3 (ref 0.1–0.9)
MONOCYTES NFR BLD AUTO: 6 % (ref 5–12)
NEUTROPHILS # BLD AUTO: 6.36 10*3/MM3 (ref 1.7–7)
NEUTROPHILS NFR BLD AUTO: 66.1 % (ref 42.7–76)
NRBC BLD AUTO-RTO: 0 /100 WBC (ref 0–0.2)
PLATELET # BLD AUTO: 310 10*3/MM3 (ref 140–450)
PMV BLD AUTO: 8.7 FL (ref 6–12)
RBC # BLD AUTO: 4.24 10*6/MM3 (ref 3.77–5.28)
TIBC SERPL-MCNC: 286 MCG/DL (ref 298–536)
TRANSFERRIN SERPL-MCNC: 192 MG/DL (ref 200–360)
VIT B12 BLD-MCNC: 1724 PG/ML (ref 211–946)
WBC NRBC COR # BLD: 9.64 10*3/MM3 (ref 3.4–10.8)

## 2020-01-23 PROCEDURE — 82607 VITAMIN B-12: CPT | Performed by: INTERNAL MEDICINE

## 2020-01-23 PROCEDURE — 36415 COLL VENOUS BLD VENIPUNCTURE: CPT

## 2020-01-23 PROCEDURE — 84466 ASSAY OF TRANSFERRIN: CPT | Performed by: INTERNAL MEDICINE

## 2020-01-23 PROCEDURE — 83540 ASSAY OF IRON: CPT | Performed by: INTERNAL MEDICINE

## 2020-01-23 PROCEDURE — 85025 COMPLETE CBC W/AUTO DIFF WBC: CPT | Performed by: INTERNAL MEDICINE

## 2020-01-23 PROCEDURE — 82728 ASSAY OF FERRITIN: CPT | Performed by: INTERNAL MEDICINE

## 2020-01-23 PROCEDURE — 99214 OFFICE O/P EST MOD 30 MIN: CPT | Performed by: INTERNAL MEDICINE

## 2020-01-23 NOTE — PROGRESS NOTES
.     REASON FOR CONSULTATION:     1.  Provide an opinion on any further workup or treatment of anemia and possible blood clots with suspected episodes of strokes.   2.  Laboratory study on 5/31/2018 reported significantly elevated anti-phosphatidylserine IgM at 57, and marginally elevated anticardiolipin IgM 14.  All others were negative.   3.  Laboratory tests confirmed vitamin B12 deficiency and iron deficiency on 5/31/2018.    4.  Repeated laboratory study on 9/13/2018 showed persistently significantly elevated anti-phosphatidylserine IgM at 65.  She also has persistent significant iron deficiency and vitamin B12 deficiency.   5.  Patient was not able to tolerate oral iron due to significant constipation, she was given 2 doses Injectafer in end of September 2018, with significant improved iron panel and normalization of hemoglobin in December 2018.   6.  Patient had recurrent iron deficiency in June 2019 due to heavy menses.  Patient was given Injectafer weekly for 2 doses.  Her vitamin B12 level improved at 404 on 6/24/2019.    7.  Patient now taking vitamin B12 supplement with great tolerance.  Waiting for lab results of iron profile ferritin and vitamin B12.      HISTORY OF PRESENT ILLNESS:  The patient is a 40 y.o. year old female who is here for follow-up. The patient is accompanied by her  today.    She is no longer taking oral iron due to intolerance.  She had IV iron therapy in late June and early July 2019.  She reports she felt very tired for a few days after the IV iron, and she slept a lot.  She notes she had insomnia prior to starting on the IV iron therapy.     The patient reports she was seen at the hospital in September 2019 due to an acute headache, dizziness, and chest discomfort.  She had an MRI of the brain done at that time, which was stable.  She followed up with Dr. Khanna, neurologist, who believed that her symptoms were related to her vitamin B-12 deficiency.  She was  started on vitamin B-12 supplements daily at that time.  She continues to take Vitamin B-12 and is tolerating it well.  She reports she has been feeling much better over the past few months and is feeling well today.  The patient reports no fever, no sweating, no chills. Her performance status is ECOG 1.     The patient reports she tolerates Eliquis 2.5 mg every 12 hours. She is on low dose Eliquis because of heavy menses. She continues to have regular cycle but heavy menses lasting for about 8 days.    The patient also reports she has residual symptoms from her previous stroke. She also reports improved symptomology, but still has some residual problem which still interferes with her quality of life.     Laboratory studies on 2020 show to be seen including hemoglobin 12.4, MCV 88.0 MCHC 33.2, platelets 310,000 and a WBC 9640 including ANC 6360 and lymphocytes 2500.      Past Medical History:   Diagnosis Date   • Anemia     B12 anemia during pregnancy in    • Antiphospholipid syndrome (CMS/HCC)    • Autonomic dysfunction    • Blurry vision, bilateral    • Cervical cancer (CMS/HCC) 1965    Stage 0    • H/O HPV in female    • H/O Incomplete RBBB 2013   • H/O Raynaud phenomenon     Probable at age 31 during pregnancy   • History of prior pregnancies     x3   • History of syncope    • MS (multiple sclerosis) (CMS/HCC)    • Palpitation    • POTS (postural orthostatic tachycardia syndrome)    • Stroke (CMS/HCC)     Blood clot, age 26   • SVT (supraventricular tachycardia) (CMS/HCC)    • Tattoos    • TIA (transient ischemic attack) 2019     Past Surgical History:   Procedure Laterality Date   • CERVICAL BIOPSY  W/ LOOP ELECTRODE EXCISION       OB/GYN history.  Menarche age 17.  .  No use of birth control pill use.  No IUD.     HEMATOLOGIC/ONCOLOGIC HISTORY:  The patient is a 38 y.o. year old female who is here for initial evaluation with the above-mentioned history on 2018.    Patient  presented for evaluation reported by her primary care physician Dr. Bravo because of recurrent episodes of blurry visions.  Patient also has abnormal brain MRI examination on multiple occasions.  She was origin of diagnosis of multiple sclerosis, however was told by urologist Dr. Khanna that she did not have multiple sclerosis.  Most recently patient was seen by a different neurologist Dr. Scott that her MRI images did not fit with typical MS changes, instead it may be changes related to thrombotic stroke.  This patient has chronic migraine headache, and was taking Topamax low-dose with good results but with side effects and the medicine was recently changed to Trokendl XR.     This patient also has history of iron deficiency for which she has been taking oral iron for many years.  She also had vitamin B12 deficiency.  Her laboratory study on 11/20/2017 clearly reported iron deficiency with ferritin 8 ng/mL, and she was already mildly anemic with hemoglobin 11.3, and MCV 82.9. She had a normal WBC 6700 including neutrophils 3850 and lymphocytes 2190, monocytes 420.  Her platelets was 323,000.     Laboratory study on 3/23/2018 reported hemoglobin 11.6, MCV 83.1 MCHC 33.6.  Her WBC was 7900 including neutrophils 5240, lymphocytes 1980, monocytes 500.  Her platelets were 341,000.  Her iron studies reported free iron 106 TIBC 365 and iron saturation 29%, vitamin B12 at 242 pg/mL and vitamin D 20 ng/mL.  There was no ferritin level nor folic acid level.      Laboratory studies we obtained on 05/31/2018 showed significantly elevated antiphosphatidylserine antibody IgM subtype at 57, but negative for IgG and IgA subtype.  She is also marginally positive for anticardiolipin IgM at 14, but negative for IgG subtype.  She was tested negative for other hypercoagulable tests including anti-Beta-2 glycoprotein 1 antibodies, negative for lupus anticoagulant and normal antithrombin 103%, normal protein C activity 117%, normal  protein S activity 81% with free protein S antigen 90%.  She is also negative for factor II mutation and factor V Leiden mutation.  We also checked her for comprehensive rheumatoid panel which is all negative as well as negative for the rheumatoid factor and direct WANG.  She also had iron deficiency, ferritin 12.5, iron saturation 12% 05/31/2018.     The patient presented on 6/14/2018 for reevaluation and to discuss laboratory results obtained on 05/31/2018 when I saw her for the 1st time for evaluation of possible thrombosis as well as persistent anemia.  The patient reports no changes in her clinical condition the past 6 days.  No recent episodes of blurry vision.  She is on low-dose aspirin 81 mg daily.  The patient also has been on low-dose oral iron 28 mg daily with good compliance.  She was not able to tolerate the large dose of ferrous sulfate at 65 mg tablets because of significant constipation.  The patient is not really compliant with oral vitamin B12 supplementation.    We started patient on Eliquis 5 mg twice a day on 6/14/2018.  This patient is likely having antiphospholipid antibody syndrome.    In September 2018, patient reports she has no recurrence of blurry vision, no headaches and dizziness since she started on Eliquis 5 mg twice a day in middle June 2018.  She feels more fit.  Her  also reports patient is more energetic in the past 3 months.  Patient reports no easy bleeding or bruising.  She does note somewhat increased volume of her menses.    Patient reports she was not able to tolerate even low-dose oral iron with significant constipation.  She also reports vitamin B12 causes palpitation.  So currently she is not taking oral iron or vitamin B-12.      Laboratory results on 9/13/2018 reported persistently elevated anti-phosphatidylserine IgM at 65, however resolution of mildly elevated anticardiolipin IgM antibody.  She has persistent iron deficiency with ferritin 8 ng/mL, iron  saturation 7%, both of which actually worse than those levels on 5/31/2018.  Her B12 level also is low at 236 pg/mL.  She has worsening anemia with hemoglobin 11.2 but maintains normal WBC 6500 and platelets 292,000.     Patient had IV Injectafer at the end of September and early October.  She reports improved energy level.      Laboratory studies on 12/13/2018 reported normalized hemoglobin at 12.8, MCV 88.0, MCHC 33.4.  She maintains normal platelets at 262,000 and WBC 7670.  Her iron study showed significantly improved ferritin 206 ng/mL and iron saturation 18%.  Her vitamin B12 level 264 pg/mL.      On 6/24/2019, the patient complained of recent palpitations, and was seen by a cardiologist, who prescribed her metoprolol 25 mg, however she has not started it yet.      Her laboratory study on 6/24/2019 reported normal hemoglobin 12.4, MCV 87.7, normal platelets 296,000 and WBC 6090.  It also reported recurrent iron deficiency with a ferritin 36.8, and iron saturation 10% free iron 34 TIBC 326.  Slightly improved vitamin B12 level at 404 pg/mL.  she has recurrent iron deficiency, most likely due to her heavy menses.  Patient was not able to tolerate oral iron.     Patient was given Injectafer 2 doses in July 2019.     MEDICATIONS    Current Outpatient Medications:   •  acyclovir (ZOVIRAX) 400 MG tablet, Take 400 mg by mouth Every 4 (Four) Hours While Awake. Take no more than 5 doses a day. Take as needed, Disp: , Rfl:   •  apixaban (ELIQUIS) 2.5 MG tablet tablet, Take 2.5 mg by mouth 2 (Two) Times a Day., Disp: , Rfl:   •  ELIQUIS 5 MG tablet tablet, TAKE 1 TABLET BY MOUTH EVERY 12 HOURS, Disp: 60 tablet, Rfl: 0  •  Ferrous Sulfate (IRON) 325 (65 Fe) MG tablet, Take  by mouth 1 (One) Time Per Week., Disp: , Rfl:   •  vitamin B-12 (CYANOCOBALAMIN) 100 MCG tablet, Take 50 mcg by mouth Daily., Disp: , Rfl:   (She takes decreased to Eliquis two-point every 12 hours, is not started on metoprolol).     ALLERGIES:      Allergies   Allergen Reactions   • Codeine Rash     vomiting       SOCIAL HISTORY:       Social History     Social History   • Marital status:      Spouse name: Bebo   • Number of children: 3   • Years of education: College      Occupational History   • , Artist       Social History Main Topics   • Smoking status: Never Smoker   • Smokeless tobacco: Never Used   • Alcohol use Yes      Comment: wine Usually in holidays, less than 1 drink every 2 weeks    • Drug use: No    • Sexual activity: Not on file         FAMILY HISTORY:  Family History   Problem Relation Age of Onset   • Cancer Mother Diagnosed of uterine cancer at age 38 post surgery.  Currently age 64 in 2018 no details.      • Other Mother, Also has chronic anemia.  Gallbladder stone.           Heart murmur   • ADD / ADHD Brother    Family history of strokes in grandmother also had Alzheimer's disease.   Gallbladder stone in mother, grandmother and 3 aunts.     REVIEW OF SYSTEMS:  Review of Systems   Constitutional: Negative for chills, fatigue and fever.   HENT: Negative for congestion, facial swelling and rhinorrhea.    Eyes: Positive for visual disturbance.   Respiratory: Negative for shortness of breath.    Cardiovascular: Positive for palpitations. Negative for chest pain and leg swelling.   Gastrointestinal: Negative for abdominal pain, blood in stool, diarrhea and nausea.   Endocrine: Negative for polyuria.   Genitourinary: Negative for dysuria, frequency and hematuria.   Musculoskeletal: Negative for arthralgias, back pain and joint swelling.        Trouble with her hands   Allergic/Immunologic: Negative for immunocompromised state.   Neurological: Negative for dizziness, syncope, speech difficulty, numbness and headaches.   Hematological: Negative for adenopathy. Does not bruise/bleed easily.   Psychiatric/Behavioral: Negative for agitation and confusion.            Vitals:    01/23/20 1521   BP: 115/76   Pulse: 93   Resp: 16  "  Temp: 98.1 °F (36.7 °C)   SpO2: 97%   Weight: 77.2 kg (170 lb 4.8 oz)   Height: 165 cm (64.96\")  Comment: new ht.   PainSc: 0-No pain     Current Status 1/23/2020   ECOG score 0      PHYSICAL EXAM:      GENERAL:  Well-developed, well-nourished female in no acute distress.   SKIN:  Warm, dry without rashes, purpura or petechiae.  HEAD:  Normocephalic.  EYES:  Pupils equal, round and reactive to light.  EOMs intact.  Conjunctivae normal.  EARS:  Hearing intact.  NOSE:  No nasal discharge.  MOUTH:  Tongue is well-papillated; no stomatitis or ulcers.  Lips normal.  THROAT:  Oropharynx without lesions or exudates.  NECK:  Supple; no thyromegaly or masses.  LYMPHATICS:  No cervical, supraclavicular adenopathy.  CHEST:  Lungs clear to auscultation. Good airflow.  Normal respiratory effort.  CARDIAC:  Regular rate and rhythm without murmurs, rubs or gallops. Normal S1,S2.  ABDOMEN:  Soft, nontender with no organomegaly or masses.  EXTREMITIES:  No clubbing, cyanosis or edema.  NEUROLOGICAL:  Cranial Nerves II-XII grossly intact.  No focal neurological deficits.  PSYCHIATRIC:  Normal affect and mood.         RECENT LABS:    Lab Results   Component Value Date    WBC 9.64 01/23/2020    HGB 12.4 01/23/2020    HCT 37.3 01/23/2020    MCV 88.0 01/23/2020     01/23/2020     Lab Results   Component Value Date    NEUTROABS 6.36 01/23/2020       Lab Results   Component Value Date    IRON 59 01/23/2020    TIBC 286 (L) 01/23/2020    FERRITIN 284.60 (H) 01/23/2020   Iron saturation 21% on 1/23/2020        Assessment/Plan      1.  Antiphospholipid antibody syndrome, with significantly anti-phosphatidylserine IgM antibody twice more than 3 months apart.  This patient had symptoms with blurry vision, bilateral, ? Cerebrovascular accident (CVA).  This patient has multiple major complaints, and was thought related to multiple sclerosis, however she was told by 2 different neurologists that the MRI changes did not fit with multiple " sclerosis.  Instead it may be more of stroke.  This patient also has intermittent lower extremity edema although no previous evidence of venous thrombosis.     Her laboratory studies in May 2018 reported significantly elevated antiphosphatidylserine antibody IgM at 57.  We repeated laboratory study on 9/13/2018, she had persistently elevated anti-phosphatidylserine antibody IgM 65.  So she has antiphospholipid syndrome.      Since we started her on Eliquis 5 mg twice a day in middle June 2018, she initially reports resolution of blurry vision.  However later she claimed she had recurrent blurry vision from time to time.   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       Her Eliquis was decreased to 2.5 mg twice a day due to heavy menses and consequently recurrent iron deficiency.     Patient is followed by Dr. Khanna, neurology.  According to patient, Dr. Khanna thought her symptomology was secondary to vitamin B12 deficiency.  Since that time patient has been on consistent oral vitamin B12 supplementation.  She reports today on 1/23/2020 that her symptoms has improved significantly.      2.  Iron deficiency anemia, secondary to heavy menses.  Patient previously was not able to tolerate high-dose ferrous sulfate because of significant constipation.  So she switch her to low-dose oral iron once a day for a few years with good compliance.  In July because of recurrent on deficiency, I asked to increase to 3 times a day however she is not able to tolerate it because of constipation problem.      We repeated her laboratory study again on 9/13/2018, she actually has worsening iron saturation and ferritin level.  She also reports somewhat increased menstrual volume since she is being on oral Eliquis for anticoagulation.      With the above reason, we treated her with 2 doses of intravenous iron with Injectofar in end of September and early  October 2018.  Patient has great response.  She reports only taking oral iron once a day sometimes but with significant constipation.     She is no longer taking oral iron due to intolerance.  She had IV iron in late June and early July 2019 because recurrent iron deficiency.  She reports she felt very tired for a few days after the IV iron, and she slept a lot.  She notes she had insomnia prior to starting on the IV iron therapy.  Patient reports those symptoms has largely improved.     Lab study today on 1/23/2020 reported a significant improved ferritin 284 and a normal iron saturation 21%.    3.  B12 deficiency.  This patient has history of B12 deficiency and was given B12 injection previously.  However she has not received B12 in the past couple of years.  I instructed her to restart taking oral vitamin B12, however she reported that vitamin B12 causes palpitation. Patient tried again but still not able to tolerate.     As I discussed with her previously, we would try to avoid using vitamin B12 intramuscular injection because of the risk of intramuscular hematoma as she is on anticoagulation.     As of 1/23/2020, patient reports taking vitamin B12 supplement daily and tolerating it well.      Lab results today, 1/23/2020, are pending.      PLAN:      1. Waiting for lab results of vitamin B12.  We will call her with results.    2. Continue Eliquis 2.5 mg twice a day.  She needs to be cautious for injury or bleeding.   3. Continue vitamin B-12 supplementation daily.  4. Continue follow up with Dr. Khanna, neurology.  5. We'll repeat laboratory studies for CBC, ferritin, iron profile and vitamin B12 level every 6 months.  6. Patient will return in 1 year for M.D. evaluation and laboratory studies for CBC, ferritin, iron profile and vitamin B12 level.      By signing my name here, I Blanco Bartholomew, attest that all documentation on 01/23/20 at 5:18 PM has been prepared under the direction and in the presence of  Dr. Cristian Burgos MD.    I reviewed the note scribed by Blanco Bartholomew and made appropriate corrections.     CRISTIAN BURGOS M.D., Ph.D.        Addendum:    Lab Results   Component Value Date    [id number] 1,724 (H) 01/23/2020     Vitamin B12 level he has significantly improved compared to that of June 2019.  Continue oral vitamin B12.    We will inform the patient.    CRISTIAN BURGOS M.D., Ph.D.    1/26/2020.    CC: Marii Bravo MD        Dictated using Dragon Dictation.

## 2020-01-24 ENCOUNTER — TELEPHONE (OUTPATIENT)
Dept: ONCOLOGY | Facility: CLINIC | Age: 40
End: 2020-01-24

## 2020-01-26 PROBLEM — Z79.01 ANTICOAGULATION ADEQUATE: Status: ACTIVE | Noted: 2020-01-26

## 2020-01-29 ENCOUNTER — TELEPHONE (OUTPATIENT)
Dept: ONCOLOGY | Facility: CLINIC | Age: 40
End: 2020-01-29

## 2020-01-29 NOTE — TELEPHONE ENCOUNTER
Spoke with patient regarding her kjnow that her vitamin b 12 levels have improve and that she is to continue to take oral vitamin  b 12 daily      ----- Message from Johny Burgos MD PhD sent at 1/23/2020  9:24 PM EST -----  Dena, would you please call and tell patient her vitamin B12 level has significantly improved?  She is continue daily oral vitamin B12.      Thank you!     Dr. Burgos

## 2020-02-21 ENCOUNTER — TELEPHONE (OUTPATIENT)
Dept: ONCOLOGY | Facility: CLINIC | Age: 40
End: 2020-02-21

## 2020-02-21 RX ORDER — APIXABAN 5 MG/1
TABLET, FILM COATED ORAL
Qty: 60 TABLET | Refills: 0 | OUTPATIENT
Start: 2020-02-21

## 2020-02-21 NOTE — TELEPHONE ENCOUNTER
Dr Burgos's pt called she needs med refill:   apixaban (ELIQUIS) 2.5 MG tablet tablet       Pharmacy:  [facility] [address] [place] [address],    Pt would like a callback from the nurse regarding her apixaban (ELIQUIS)  Pt would like to switch back to 5MG if possible...    [phone]    Thanks

## 2020-04-02 ENCOUNTER — TELEPHONE (OUTPATIENT)
Dept: ONCOLOGY | Facility: CLINIC | Age: 40
End: 2020-04-02

## 2020-04-02 NOTE — TELEPHONE ENCOUNTER
PT CALLING ABOUT L ARM SWELLING. PT STATES THAT SHE HAD L ARM SWELLING FOR 3 D AND IT HAS COMPLETELY GONE AWAY. STATES THAT IT WASN'T REALLY SWOLLEN JUST HER VEINS LOOKED BIGGER AND ARM FELT UNCOMFORTABLE. ALL THAT HAS RESOLVED. PT IS ON ELIQUIS. LAST NIGHT HER HEART FELT LIKE IT WAS RACING AND SHE FELT SOB. TOLD HER TO CALL HER PCP ABOUT HER HEART RACING/SOB. PT V/U.

## 2020-04-22 ENCOUNTER — TELEPHONE (OUTPATIENT)
Dept: ONCOLOGY | Facility: CLINIC | Age: 40
End: 2020-04-22

## 2020-04-22 NOTE — TELEPHONE ENCOUNTER
PT. STATES SHE HAS BEEN HAVING SXS FROM HER APS SINCE SHE WAS TOLD TO DECREASE THE ELIQUIS TO 2.5MG BID FROM 5MG BID.  REPORTS SOA, MOSTLY AT NIGHT.  REPORTS TIGHTNESS IN HER CHEST OFF AND ON.  STATES THE SXS COME ON ALL OF A SUDDEN.  ALSO REPORTS SOME NIEVES'S.  ALL D/W DR. YANG, WILL GO BACK UP TO 5MG BID.  PT. PLEASED WITH ANSWER.  WILL SEND THE 5MG TO THE [facility] IN [place].  PT. TO CALL FOR ANY OTHER QUESTIONS OR CONCERNS.

## 2020-05-13 ENCOUNTER — PROCEDURE VISIT (OUTPATIENT)
Dept: OBSTETRICS AND GYNECOLOGY | Age: 40
End: 2020-05-13

## 2020-05-13 ENCOUNTER — OFFICE VISIT (OUTPATIENT)
Dept: OBSTETRICS AND GYNECOLOGY | Age: 40
End: 2020-05-13

## 2020-05-13 VITALS
HEIGHT: 65 IN | WEIGHT: 168 LBS | SYSTOLIC BLOOD PRESSURE: 118 MMHG | BODY MASS INDEX: 27.99 KG/M2 | DIASTOLIC BLOOD PRESSURE: 76 MMHG

## 2020-05-13 DIAGNOSIS — Z79.01 ANTICOAGULATION ADEQUATE: ICD-10-CM

## 2020-05-13 DIAGNOSIS — D50.0 IRON DEFICIENCY ANEMIA DUE TO CHRONIC BLOOD LOSS: ICD-10-CM

## 2020-05-13 DIAGNOSIS — I63.9 CEREBROVASCULAR ACCIDENT (CVA), UNSPECIFIED MECHANISM (HCC): ICD-10-CM

## 2020-05-13 DIAGNOSIS — D68.61 ANTIPHOSPHOLIPID ANTIBODY SYNDROME (HCC): ICD-10-CM

## 2020-05-13 DIAGNOSIS — Z13.0 SCREENING FOR IRON DEFICIENCY ANEMIA: ICD-10-CM

## 2020-05-13 DIAGNOSIS — N93.9 ABNORMAL UTERINE BLEEDING (AUB): Primary | ICD-10-CM

## 2020-05-13 DIAGNOSIS — Z13.9 SPECIAL SCREENING: ICD-10-CM

## 2020-05-13 LAB
B-HCG UR QL: NEGATIVE
INTERNAL NEGATIVE CONTROL: NEGATIVE
INTERNAL POSITIVE CONTROL: POSITIVE
Lab: NORMAL

## 2020-05-13 PROCEDURE — 76830 TRANSVAGINAL US NON-OB: CPT | Performed by: OBSTETRICS & GYNECOLOGY

## 2020-05-13 PROCEDURE — 81025 URINE PREGNANCY TEST: CPT | Performed by: OBSTETRICS & GYNECOLOGY

## 2020-05-13 PROCEDURE — 99204 OFFICE O/P NEW MOD 45 MIN: CPT | Performed by: OBSTETRICS & GYNECOLOGY

## 2020-05-13 RX ORDER — ACETAMINOPHEN AND CODEINE PHOSPHATE 120; 12 MG/5ML; MG/5ML
1 SOLUTION ORAL DAILY
Qty: 84 TABLET | Refills: 3 | Status: SHIPPED | OUTPATIENT
Start: 2020-05-13 | End: 2020-12-01

## 2020-05-13 NOTE — PROGRESS NOTES
New GYN Problem    Chief Complaint   Patient presents with   • Vaginal Bleeding     New pt. pt states she hasnt had a menstrual cycle since march so she suspected pregnancy. since early this month she has been bleeding heavily with large clots. last pap not on file       Sharyn Jones is a 40 yr old  who presents due to irregular bleeding. She skipped her menses last month and thought she might be pregnant but took a pregnancy test at home and it was negative. Now this month she has had two weeks of sometimes heavy bleeding with passage of long stringy clots. Her bleeding has now slowed significantly. She was concerned because she had a long piece of fibrous tissue that may have been a pregnancy. She is currently on anticoagulation due to APLAS with history of DVT and ?CVA and has a history of heavy menses that are usually regular. She has had resultant iron deficiency anemia. She has not had a gynecologist or a pap in years and notes a history of cervical dysplasia and LEEP.    She had mirena in the past and she had dizzy spells with it. She is concerned about taking progesterone and her APLAS.    She had a DVT (?) with her first pregnancy in  (first trimester in left leg).  She says that she was started on aspirin by Dr. Barrow to treat the blood clot but no heparin or lovenox. She doesn't recall having an ultrasound for her leg or going to the hospital for a diagnosis of the DVT, but her left leg was huge, swollen, and purple/blue. She was on full dose aspirin with her other pregnancies with Dr. Barrow.    She started Apixaban two years ago after a TIA and was tested for clotting disorder, was found then to have APLAS. Her dose was recently increased from 2.5 to 5 mg because she was having issues with left arm swelling and tingling. She also has a small PFO noted on prior echo.    She has not been contracepting and she and her  may be interested in pregnancy soon.    She is a   and illustrator, just finished illustrating a book for the [facility]    Review of Systems   Constitutional: Negative for fever and unexpected weight change.   HENT: Negative for congestion.    Respiratory: Positive for shortness of breath.         Intermittent SOB, none currently     Cardiovascular: Negative for chest pain.   Gastrointestinal: Negative for abdominal pain, constipation and diarrhea.   Endocrine: Positive for cold intolerance. Negative for heat intolerance.   Genitourinary: Negative for frequency and urgency.   Musculoskeletal: Positive for arthralgias. Negative for joint swelling.   Skin: Negative for color change and pallor.   Neurological: Positive for dizziness, light-headedness and headaches.   Hematological: Negative for adenopathy.   Psychiatric/Behavioral: Negative for dysphoric mood.       Histories  Past Medical History:   Diagnosis Date   • Anemia     B12 anemia during pregnancy in 2014   • Antiphospholipid syndrome (CMS/HCC)    • Autonomic dysfunction    • Blurry vision, bilateral    • Cervical cancer (CMS/HCC) 1965    Stage 0    • H/O HPV in female    • H/O Incomplete RBBB 09/2013   • H/O Raynaud phenomenon     Probable at age 31 during pregnancy   • History of prior pregnancies     x3   • History of syncope    • MS (multiple sclerosis) (CMS/HCC)    • Palpitation    • POTS (postural orthostatic tachycardia syndrome) 2014   • Stroke (cerebrum) (CMS/HCC)    • Stroke (CMS/HCC)     Blood clot, age 26   • SVT (supraventricular tachycardia) (CMS/HCC)    • Tattoos    • TIA (transient ischemic attack) 09/19/2019       Past Surgical History:   Procedure Laterality Date   • CERVICAL BIOPSY  W/ LOOP ELECTRODE EXCISION         Family History   Problem Relation Age of Onset   • Other Mother         Heart murmur   • Uterine cancer Mother 38   • ADD / ADHD Brother    • Stroke Other    • Alzheimer's disease Other        Social History     Socioeconomic History   • Marital status:      Spouse  "name: Bebo   • Number of children: 3   • Years of education: College   • Highest education level: Not on file   Occupational History   • Occupation:    Tobacco Use   • Smoking status: Never Smoker   • Smokeless tobacco: Never Used   Substance and Sexual Activity   • Alcohol use: No     Frequency: Never     Comment: No caffeine use   • Drug use: No   • Sexual activity: Yes     Partners: Male     Birth control/protection: None       OB History        3    Para   3    Term   3            AB        Living   3       SAB        TAB        Ectopic        Molar        Multiple        Live Births   3                Physical Exam    /76   Ht 165.1 cm (65\")   Wt 76.2 kg (168 lb)   LMP 2020 (Approximate)   Breastfeeding No   BMI 27.96 kg/m²     BMI: Body mass index is 27.96 kg/m².     General:   alert, appears stated age and cooperative   skin No pallor, no rash noted   Neck Nontender, no lymphadenopathy, no thyromegaly   Lung lungs clear to auscultation, no wheezes or rhonchi   Heart: heart regular rate and rhythm, no murmurs   Abdomen: soft, non-tender, without masses or organomegaly   Breast: inspection negative, no nipple discharge or bleeding, no masses or nodularity palpable   Urethra and bladder: urethral meatus normal; bladder nontender to palpation;   Vulva: normal, Bartholin's, Urethra, Vineyards's normal   Vagina: normal mucosa, scant blood   Cervix: multiparous appearance, no lesions and multiple nabothian cysts   Uterus: normal size or anteverted   Adnexa: normal adnexa and no mass, fullness, tenderness       Assessment/Plan    Abnormal uterine bleeding  APLAS  History of DVT and possible CVA, TIA  Chronic anticoagulation  Small PFO  Considering pregnancy    Her concern was that she may have been pregnant and having a miscarriage, however her urine pregnancy test is negative today.  If currently miscarrying, she would still have had enough urine hCG for positive test.  I also " offered serum beta-hCG, but she declined blood draw today.  She likely had an anovulatory cycle last month that then resulted in prolonged bleeding this month.  She seems to have had some prolonged issues with heavy menstrual bleeding, likely due to her anticoagulant use.  We discussed options that would be safe for her in order to regulate her bleeding or create amenorrhea.  Her options are limited due to clotting disorder.  We discussed Micronor or other progesterone only methods and levonorgestrel IUD.  She has had the IUD before and had side effects and so is not interested.  I prescribed Micronor for her today, with the expectation that she may have some spotting that could be prolonged but then hopefully would have much lighter bleeding in future.   I ordered TSH, FSH, estradiol to further evaluate her irregular bleeding but she declined lab draw today.  Her endometrial stripe is thin and homogenous today, did not perform endometrial biopsy.    Her possible desire for pregnancy is concerning given her medical comorbidities and age.  I discussed adverse outcomes related to antiphospholipid antibody syndrome such as significant thrombotic event, increased risk of miscarriage, increased risk of growth restriction, need for early delivery, risk for severe preeclampsia and associated adverse outcomes.  Recommend that due to her age and medical comorbidities, she needs to carefully examine her desire for pregnancy and if she is interested a plan for anticoagulation should be developed prior to conception.  I recommended that she meet with maternal-fetal medicine to further discuss prior to conception if she does desire to conceive.  She plans to discuss with her  and will start Micronor for now.    Pap was collected today    Return in about 6 months (around 11/13/2020) for follow up bleeding.    Ivory Gagnon MD  05/13/2020  12:31

## 2020-05-18 ENCOUNTER — TELEPHONE (OUTPATIENT)
Dept: OBSTETRICS AND GYNECOLOGY | Age: 40
End: 2020-05-18

## 2020-05-18 LAB
CYTOLOGIST CVX/VAG CYTO: NORMAL
CYTOLOGY CVX/VAG DOC CYTO: NORMAL
CYTOLOGY CVX/VAG DOC THIN PREP: NORMAL
DX ICD CODE: NORMAL
HIV 1 & 2 AB SER-IMP: NORMAL
HPV I/H RISK 4 DNA CVX QL PROBE+SIG AMP: NEGATIVE
OTHER STN SPEC: NORMAL
STAT OF ADQ CVX/VAG CYTO-IMP: NORMAL

## 2020-05-18 NOTE — TELEPHONE ENCOUNTER
----- Message from Ivory Gagnon MD sent at 5/18/2020  1:12 PM EDT -----  Please notify that her Pap was normal and HPV negative    ----- Message -----  From: Liza Yun MA  Sent: 5/13/2020   1:10 PM EDT  To: Ivory Gagnon MD

## 2020-05-29 ENCOUNTER — OFFICE VISIT (OUTPATIENT)
Dept: OBSTETRICS AND GYNECOLOGY | Facility: CLINIC | Age: 40
End: 2020-05-29

## 2020-05-29 DIAGNOSIS — G45.9 TIA (TRANSIENT ISCHEMIC ATTACK): Primary | ICD-10-CM

## 2020-05-29 PROCEDURE — 99443 PR PHYS/QHP TELEPHONE EVALUATION 21-30 MIN: CPT | Performed by: OBSTETRICS & GYNECOLOGY

## 2020-05-29 NOTE — PROGRESS NOTES
"Maternal Fetal Medicine Telemedicine Consult    Patient Name:  Sharyn Jones  :  1980  MRN:  [id number]  Date of Service:  2020  Referring Provider: Dr Gagnon    Verbal consent for telephone encounter was obtained by me.        ID:   40 y.o. , nonpregnant     Consultation due to:  History of Antiphospholipid Antibody Syndrome, MS, PFO, Advanced Maternal Age. Preconception      Pregnancy course significant for:   Patient Active Problem List   Diagnosis   • Iron (Fe) deficiency anemia   • B12 deficiency   • Blurry vision, bilateral   • Stroke (CMS/HCC)   • Skin macule or macular rash   • Hypercoagulation syndrome (CMS/HCC)   • Adverse effect of iron or its compound, initial encounter   • Numbness of fingers of both hands   • Palpitations   • Iron deficiency anemia   • TIA (transient ischemic attack)   • Anticoagulation adequate         History of Present Illness:     40 year old  with clinical history of multiple sclerosis (on no medication), prior DVT/CVA and APLAS (taking Eloquis 5mg BID) and known PFO presents for preconception consultation.   She is followed by Neurology at [facility] (most recently 20) and has been seen in the past by Cardiology at [facility].   She was first diagnosed with MS in  after presenting with \"dizzy spells\". She had an MRI and lumbar puncture which confirmed the diagnosis. MRI 10/2019 showed stable lesions. She recently reported to Neurology some increasing symptoms (dizziness, memory loss) and a repeat MRI was suggested. She is not currently on disease modifying medication.   She reports a possible DVT during her first pregnancy () but it was not confirmed by imaging and she self medicated with low dose ASA. She then was evaluated for a potential TIA two years ago. She was tested at that time for APLAS and noted to have positive labs. She was started on Eloquis and has recently been increased to 5mg BID. She did not have repeat APLAS labs.   She did " have an echocardiogram in late  which revealed a potential small PFO.   Currently she feels well.     Prenatal Labs   Lab Results   Component Value Date    HGB 12.4 2020       REVIEW OF SYSTEMS    14 point review of systems reviewed. Pertinent positives and negatives reviewed in HPI. All other systems are negative.    OB HISTORY    OB History    Para Term  AB Living   3 3 3     3   SAB TAB Ectopic Molar Multiple Live Births             3      # Outcome Date GA Lbr Rashad/2nd Weight Sex Delivery Anes PTL Lv   3 Term 03/23/15    M Vag-Spont   JOSE LUIS      Complications: Anemia   2 Term 11    M Vag-Spont   JOSE LUIS      Complications: Anemia   1 Term 08/13/10    M Vag-Spont   JOSE LUIS      Birth Comments: DVT in first trimester?       PAST MEDICAL HISTORY    Past Medical History:   Diagnosis Date   • Anemia     B12 anemia during pregnancy in    • Antiphospholipid syndrome (CMS/HCC)    • Autonomic dysfunction    • Blurry vision, bilateral    • Cervical cancer (CMS/HCC) 1965    Stage 0    • H/O HPV in female    • H/O Incomplete RBBB 2013   • H/O Raynaud phenomenon     Probable at age 31 during pregnancy   • History of prior pregnancies     x3   • History of syncope    • MS (multiple sclerosis) (CMS/HCC)    • Palpitation    • POTS (postural orthostatic tachycardia syndrome)    • Stroke (cerebrum) (CMS/HCC)    • Stroke (CMS/HCC)     Blood clot, age 26   • SVT (supraventricular tachycardia) (CMS/HCC)    • Tattoos    • TIA (transient ischemic attack) 2019       PAST SURGICAL HISTORY     has a past surgical history that includes Cervical biopsy w/ loop electrode excision.    CURRENT MEDICATIONS      Current Outpatient Medications:   •  acyclovir (ZOVIRAX) 400 MG tablet, Take 400 mg by mouth Every 4 (Four) Hours While Awake. Take no more than 5 doses a day. Take as needed, Disp: , Rfl:   •  apixaban (ELIQUIS) 5 MG tablet tablet, Take 1 tablet by mouth Every 12 (Twelve) Hours., Disp: 60 tablet,  Rfl: 3  •  Ferrous Sulfate (IRON) 325 (65 Fe) MG tablet, Take  by mouth 1 (One) Time Per Week., Disp: , Rfl:   •  norethindrone (MICRONOR) 0.35 MG tablet, Take 1 tablet by mouth Daily., Disp: 84 tablet, Rfl: 3  •  vitamin B-12 (CYANOCOBALAMIN) 100 MCG tablet, Take 50 mcg by mouth Daily., Disp: , Rfl:     ALLERGIES    Epinephrine and Codeine    SOCIAL HISTORY    Social History     Tobacco Use   Smoking Status Never Smoker   Smokeless Tobacco Never Used      Social History     Substance and Sexual Activity   Alcohol Use No   • Frequency: Never    Comment: No caffeine use     Social History     Substance and Sexual Activity   Drug Use No       FAMILY HISTORY  Noncontributory     PHYSICAL EXAMINATION      Deferred (teleconsult)         ASSESSMENT/PLAN:  40 y.o. female  with MS, APS, PFO, AMA status - seen for telemedicine consultation.     1. AMA:     We had an extensive discussion with the patient regarding her increased age based risks for fetal aneuploidies. We discussed advanced screening tests such as NIPS and the limitations of such testing. We also discussed CVS vs amniocentesis and the risks/benefits of each.  We also discussed increased risk for general obstetric complications, including gestational diabetes, gestational hypertension, preeclampsia, intrauterine growth restriction and IUFD, as well as exacerbation of any baseline maternal medical illnesses.      2. MS:   Multiple sclerosis (MS) is a chronic autoimmune demyelinating disease of the central nervous system characterized by clinical attacks including sensory loss, weakness, paresthesias and a host of other neurologic symptoms. The precise etiology of MS is uncertain. There is evidence that autoimmunity (autoreactive T and B cells) plays an important role. It is believed that an interaction between multiple genes influencing the immune system and environmental factors [such as certain viruses, low vitamin D level, smoking and sun exposure] are  important. The familial recurrence rate of MS is 15 percent and the incidence in offspring is increased 15-fold.    In the  prospective multicenter study from 2006, Pregnancy In Multiple Sclerosis (PRIMS), findings from 254 pregnancies complicated by MS were reported. The authors noted a decreased relapse rate during pregnancy but a and significantly increased relapse rates postpartum. This was believed related to increased T helper type 2: type 1 cell ratios during pregnancy. Uncomplicated MS usually has no adverse effects on pregnancy outcome. Women may become fatigued more easily and those with bladder dysfunction are prone to urinary infection. Korey and associates (2005) studied  outcomes in 649 women with MS and reported that despite a lower mean birth weight than controls,  mortality rates were not increased.     Treatments of MS may need to be modified during pregnancy. Goals are to arrest acute or initial attacks, employ disease-modifying agents, and provide symptomatic relief. Acute or initial attacks are treated with high-dose intravenous methylprednisone-500 to 1000 mg daily for 3 to 5 days, followed by oral prednisone for 2 weeks. Plasma exchange may be considered. Symptomatic relief can be provided by analgesics; carbamazepine, phenytoin or amitriptyline for neurogenic pain; baclofen for spasticity; alpha-2 adrenergic blockade to relax the bladder neck; and cholinergic and anticholinergic drugs to stimulate or inhibit bladder contractions.     There is evidence that intravenous immune globulin (IVIG) therapy-0.4 g/kg for 5 days-given during postpartum weeks 1, 6 and 12 reduces relapse rates. Postpartum exacerbations may prevent women from breastfeeding. These may also inhibit the ability to provide general  care and the need for assistance during this period should be anticipated.      3. Possible APS (not confirmed):   The diagnosis of APS requires lab abnormalities to  be positive twice  by 12 weeks, as well as one clinical criteria (VTE, severe early onset PEC, IUFD, 3 or more early pregnancy losses). Certainly I agree with Ms Jones being anticoagulated throughout any future pregnancy, as her risk of VTE is elevated with a prior (presumed) DVT and CVA. However, I think it is important to differentiate APS with prior VTE x 2 vs isolated VTE as there are associated risks with APS. For example, APS is associated with an increased risk of IUGR, preeclampsia, fetal and recurrent pregnancy loss.   I would recommend sending the following labs: beta-2 glycoprotein IgG/IgM, lupus anticoagulant, anticardiolipin IgG/IgM. If any are positive, low dose ASA should be initiated at conception, as well as switching to prophylactic lovenox or heparin. We discussed that Eloquis is not well studied in pregnancy. In addition, if APS is confirmed, I would recommend baseline 24 hour urine and PIH labs in early pregnancy, as well as serial growth ultrasounds. If APS is not confirmed, but with two prior potential VTE episodes, I would recommend prophylactic lovenox regardless through 6 weeks PP.       4. PFO noted on prior echocardiogram: I would recommend follow up with Cardiology for possible intervention prior to any planned pregnancy, though as noted above, Ms Jones will require prophylactic anticoagulation regardless.     Thank you for this consultation. Please do not hesitate to contact me for any questions or concerns.     I spent a total time of  45   minutes with this patient of which  40  minutes was face-to-face counseling and coordination of care. Questions asked by the patient were answered.    This note has been routed to the referring MD.     Etta Arnold MD     5/29/2020   13:21

## 2020-07-22 ENCOUNTER — TELEPHONE (OUTPATIENT)
Dept: ONCOLOGY | Facility: CLINIC | Age: 40
End: 2020-07-22

## 2020-07-22 NOTE — TELEPHONE ENCOUNTER
Caller: Sharyn Jones    Relationship to patient: Self    Best call back number: [phone]    Chief complaint:  Pt has car trouble and needs to reschedule appt    Type of visit: Lab appt    Requested date: Any day in late August except Mondays    If rescheduling, when is the original appointment: 7.23.20 at 9am    Additional notes:  Pt's car went out and will be in the shop at least for the next couple of weeks.

## 2020-07-23 ENCOUNTER — APPOINTMENT (OUTPATIENT)
Dept: OTHER | Facility: HOSPITAL | Age: 40
End: 2020-07-23

## 2020-07-27 RX ORDER — APIXABAN 5 MG/1
TABLET, FILM COATED ORAL
Qty: 180 TABLET | Refills: 0 | Status: SHIPPED | OUTPATIENT
Start: 2020-07-27 | End: 2020-10-20

## 2020-08-04 ENCOUNTER — APPOINTMENT (OUTPATIENT)
Dept: OTHER | Facility: HOSPITAL | Age: 40
End: 2020-08-04

## 2020-10-01 ENCOUNTER — TELEPHONE (OUTPATIENT)
Dept: ONCOLOGY | Facility: CLINIC | Age: 40
End: 2020-10-01

## 2020-10-01 NOTE — TELEPHONE ENCOUNTER
Patient calling to reschedule her lab from 8/4/20 to see if she needs an infusion. Would like callback at [phone]

## 2020-10-07 ENCOUNTER — LAB (OUTPATIENT)
Dept: OTHER | Facility: HOSPITAL | Age: 40
End: 2020-10-07

## 2020-10-07 DIAGNOSIS — D50.0 IRON DEFICIENCY ANEMIA DUE TO CHRONIC BLOOD LOSS: ICD-10-CM

## 2020-10-07 DIAGNOSIS — E53.8 B12 DEFICIENCY: ICD-10-CM

## 2020-10-07 LAB
BASOPHILS # BLD AUTO: 0.04 10*3/MM3 (ref 0–0.2)
BASOPHILS NFR BLD AUTO: 0.5 % (ref 0–1.5)
DEPRECATED RDW RBC AUTO: 39.5 FL (ref 37–54)
EOSINOPHIL # BLD AUTO: 0.14 10*3/MM3 (ref 0–0.4)
EOSINOPHIL NFR BLD AUTO: 1.7 % (ref 0.3–6.2)
ERYTHROCYTE [DISTWIDTH] IN BLOOD BY AUTOMATED COUNT: 13 % (ref 12.3–15.4)
FERRITIN SERPL-MCNC: 16.9 NG/ML (ref 13–150)
HCT VFR BLD AUTO: 36 % (ref 34–46.6)
HGB BLD-MCNC: 11.5 G/DL (ref 12–15.9)
IMM GRANULOCYTES # BLD AUTO: 0.03 10*3/MM3 (ref 0–0.05)
IMM GRANULOCYTES NFR BLD AUTO: 0.4 % (ref 0–0.5)
IRON 24H UR-MRATE: 38 MCG/DL (ref 37–145)
IRON SATN MFR SERPL: 10 % (ref 20–50)
LYMPHOCYTES # BLD AUTO: 2.49 10*3/MM3 (ref 0.7–3.1)
LYMPHOCYTES NFR BLD AUTO: 29.6 % (ref 19.6–45.3)
MCH RBC QN AUTO: 27.1 PG (ref 26.6–33)
MCHC RBC AUTO-ENTMCNC: 31.9 G/DL (ref 31.5–35.7)
MCV RBC AUTO: 84.7 FL (ref 79–97)
MONOCYTES # BLD AUTO: 0.46 10*3/MM3 (ref 0.1–0.9)
MONOCYTES NFR BLD AUTO: 5.5 % (ref 5–12)
NEUTROPHILS NFR BLD AUTO: 5.24 10*3/MM3 (ref 1.7–7)
NEUTROPHILS NFR BLD AUTO: 62.3 % (ref 42.7–76)
NRBC BLD AUTO-RTO: 0 /100 WBC (ref 0–0.2)
PLATELET # BLD AUTO: 305 10*3/MM3 (ref 140–450)
PMV BLD AUTO: 9 FL (ref 6–12)
RBC # BLD AUTO: 4.25 10*6/MM3 (ref 3.77–5.28)
TIBC SERPL-MCNC: 393 MCG/DL (ref 298–536)
TRANSFERRIN SERPL-MCNC: 264 MG/DL (ref 200–360)
VIT B12 BLD-MCNC: 897 PG/ML (ref 211–946)
WBC # BLD AUTO: 8.4 10*3/MM3 (ref 3.4–10.8)

## 2020-10-07 PROCEDURE — 36415 COLL VENOUS BLD VENIPUNCTURE: CPT

## 2020-10-07 PROCEDURE — 83540 ASSAY OF IRON: CPT | Performed by: INTERNAL MEDICINE

## 2020-10-07 PROCEDURE — 85025 COMPLETE CBC W/AUTO DIFF WBC: CPT | Performed by: INTERNAL MEDICINE

## 2020-10-07 PROCEDURE — 82607 VITAMIN B-12: CPT | Performed by: INTERNAL MEDICINE

## 2020-10-07 PROCEDURE — 82728 ASSAY OF FERRITIN: CPT | Performed by: INTERNAL MEDICINE

## 2020-10-07 PROCEDURE — 84466 ASSAY OF TRANSFERRIN: CPT | Performed by: INTERNAL MEDICINE

## 2020-10-09 ENCOUNTER — TELEPHONE (OUTPATIENT)
Dept: ONCOLOGY | Facility: CLINIC | Age: 40
End: 2020-10-09

## 2020-10-09 NOTE — TELEPHONE ENCOUNTER
Pt is returning a missed call from the office. They did not leave a message.    Pt believes this is regarding her lab results.    Pt would like to know her labs results done in the office on 10/7.    Please give pt a call back at [phone]

## 2020-10-13 PROBLEM — T45.4X5A IRON ADVERSE REACTION: Status: ACTIVE | Noted: 2020-10-13

## 2020-10-14 ENCOUNTER — INFUSION (OUTPATIENT)
Dept: ONCOLOGY | Facility: HOSPITAL | Age: 40
End: 2020-10-14

## 2020-10-14 VITALS
HEART RATE: 87 BPM | TEMPERATURE: 98.6 F | HEIGHT: 64 IN | WEIGHT: 167 LBS | OXYGEN SATURATION: 99 % | DIASTOLIC BLOOD PRESSURE: 68 MMHG | RESPIRATION RATE: 18 BRPM | BODY MASS INDEX: 28.51 KG/M2 | SYSTOLIC BLOOD PRESSURE: 100 MMHG

## 2020-10-14 DIAGNOSIS — D50.9 IRON DEFICIENCY ANEMIA, UNSPECIFIED IRON DEFICIENCY ANEMIA TYPE: ICD-10-CM

## 2020-10-14 DIAGNOSIS — T45.4X5A ADVERSE EFFECT OF IRON, INITIAL ENCOUNTER: Primary | ICD-10-CM

## 2020-10-14 PROCEDURE — 96365 THER/PROPH/DIAG IV INF INIT: CPT

## 2020-10-14 PROCEDURE — 96374 THER/PROPH/DIAG INJ IV PUSH: CPT

## 2020-10-14 PROCEDURE — 25010000002 FERRIC CARBOXYMALTOSE 750 MG/15ML SOLUTION 15 ML VIAL: Performed by: INTERNAL MEDICINE

## 2020-10-14 RX ORDER — SODIUM CHLORIDE 9 MG/ML
250 INJECTION, SOLUTION INTRAVENOUS ONCE
Status: COMPLETED | OUTPATIENT
Start: 2020-10-14 | End: 2020-10-14

## 2020-10-14 RX ORDER — PROCHLORPERAZINE MALEATE 5 MG/1
10 TABLET ORAL ONCE
Status: DISCONTINUED | OUTPATIENT
Start: 2020-10-14 | End: 2020-10-14 | Stop reason: HOSPADM

## 2020-10-14 RX ADMIN — SODIUM CHLORIDE 250 ML: 900 INJECTION, SOLUTION INTRAVENOUS at 15:36

## 2020-10-14 RX ADMIN — FERRIC CARBOXYMALTOSE INJECTION 750 MG: 50 INJECTION, SOLUTION INTRAVENOUS at 15:43

## 2020-10-20 RX ORDER — APIXABAN 5 MG/1
TABLET, FILM COATED ORAL
Qty: 180 TABLET | Refills: 0 | Status: SHIPPED | OUTPATIENT
Start: 2020-10-20 | End: 2021-01-20

## 2020-10-21 ENCOUNTER — APPOINTMENT (OUTPATIENT)
Dept: ONCOLOGY | Facility: HOSPITAL | Age: 40
End: 2020-10-21

## 2020-10-26 ENCOUNTER — APPOINTMENT (OUTPATIENT)
Dept: ONCOLOGY | Facility: HOSPITAL | Age: 40
End: 2020-10-26

## 2020-11-30 ENCOUNTER — TELEPHONE (OUTPATIENT)
Dept: CARDIOLOGY | Facility: CLINIC | Age: 40
End: 2020-11-30

## 2020-11-30 NOTE — TELEPHONE ENCOUNTER
"Called-  This patient is attributing her symptoms to eating a piece of chocolate cake on November 15th.  At that time she felt like she \"couldn't get her heart to slow down,\" but when she checked her rate it was normal. She said it lasted about 10 hours. She is c/o \"stabbing\" pain upon inspiration and going up stairs.    She said she is slowly getting better, but it is still there.  She has been taking it easy since the 15th.  She is scheduled to see Dr Riggins tomorrow. And I have advised her to go to the ER if the pain gets worse or her symptoms worsen before her apt tomorrow and she verbalized understating  Thanks  Chana Pacheco RN  Triage nurse    "

## 2020-12-01 ENCOUNTER — OFFICE VISIT (OUTPATIENT)
Dept: CARDIOLOGY | Facility: CLINIC | Age: 40
End: 2020-12-01

## 2020-12-01 VITALS
HEART RATE: 78 BPM | DIASTOLIC BLOOD PRESSURE: 70 MMHG | BODY MASS INDEX: 28.68 KG/M2 | SYSTOLIC BLOOD PRESSURE: 110 MMHG | WEIGHT: 168 LBS | HEIGHT: 64 IN

## 2020-12-01 DIAGNOSIS — R00.2 PALPITATIONS: ICD-10-CM

## 2020-12-01 DIAGNOSIS — I47.1 PAROXYSMAL SVT (SUPRAVENTRICULAR TACHYCARDIA) (HCC): ICD-10-CM

## 2020-12-01 DIAGNOSIS — R07.2 PRECORDIAL PAIN: ICD-10-CM

## 2020-12-01 DIAGNOSIS — Q21.12 PFO (PATENT FORAMEN OVALE): Primary | ICD-10-CM

## 2020-12-01 PROCEDURE — 99214 OFFICE O/P EST MOD 30 MIN: CPT | Performed by: INTERNAL MEDICINE

## 2020-12-01 PROCEDURE — 93000 ELECTROCARDIOGRAM COMPLETE: CPT | Performed by: INTERNAL MEDICINE

## 2020-12-01 RX ORDER — TACROLIMUS 1 MG/G
OINTMENT TOPICAL DAILY
COMMUNITY
Start: 2020-10-30 | End: 2021-01-29

## 2020-12-01 NOTE — PROGRESS NOTES
"Date of Office Visit: 2020  Encounter Provider: Deborah Riggins MD  Place of Service: [facility] CARDIOLOGY  Patient Name: Sharyn Jones  :1980    Chief complaint  Paroxysmal supraventricular tachycardia    History of Present Illness  Patient is a 40-year-old female with history of stroke, autonomic dysfunction antiphospholipid syndrome (on chronic Eliquis therapy), Raynaud's syndrome multiple sclerosis, POTs and supraventricular tachycardia.  She has a history of supraventricular tachycardia dating back to .  She had deferred treatment with calcium channel blockers and more recently beta-blockers.  A treadmill exercise stress test was negative that was negative.  A tilt table was positive for vasovagal syncope in . In  she was seen by [facility] specialist for mild bradycardia.  Echocardiogram was normal.  Holter showed one episode of supraventricular tachycardia no significant bradycardia or arrhythmia she was subsequently seen by Dr. Gayle though he did not receive a copy.  In 2018 she had an episode of vision loss and slurred speech with paralysis.  She was diagnosed with antiphospholipid antibody and started on Eliquis.  She had an echocardiogram that showed normal LV size and function without significant valvular heart disease.  This was done elsewhereShe was eventually seen by Dr. Keyes and no additional changes were recommended.     More recently patient was seen by Dr. Khanna and felt to have had a TIA. On 2019 she had an episode of a aphasia, confusion and weakness for which she was hospitalized.  Symptoms lasted for approximately an hour.  There was a prior history of headaches and an MRI that showed \"dry strokes\" though there were no acute findings.  MRI of her head unremarkable per Dr. Khanna.Dr. Khanna felt she had a cerebral emboli with a TIA .  A SHAKILA showed normal systolic function with no atrial thrombi small patent " foramen ovale with right-to-left shunt was noted.  No significant valvular heart disease is present.  A 24-hour monitor was also performed that showed several episodes of supraventricular to 1 of which was symptomatic.  There was no ventricular arrhythmia.    Patient sent a message to my chart on the 30th at 3 PM stating that she had worsening chest pain shortness of breath and felt as if something was stuck in her chest.  She felt cold and numb between her shoulder blades her toes were blue.  She stated this started actually on November 15 after she ate some chocolate which she felt got hung up in her throat.    She is active but not exercising.  Instead she has palpitations that are worse her dyspnea is worse and she is convinced that she has a blood clot stuck in her chest and has had it there for a while.  She wonders if her current anticoagulant is adequate.  She has complaints of extreme fatigue that is chronic.  All of the symptoms are chronic.    Past Medical History:   Diagnosis Date   • Anemia     B12 anemia during pregnancy in 2014   • Antiphospholipid syndrome (CMS/HCC)    • Autonomic dysfunction    • Blurry vision, bilateral    • Cervical cancer (CMS/HCC) 1965    Stage 0    • H/O HPV in female    • H/O Incomplete RBBB 09/2013   • H/O Raynaud phenomenon     Probable at age 31 during pregnancy   • History of prior pregnancies     x3   • History of syncope    • MS (multiple sclerosis) (CMS/HCC)    • Palpitation    • POTS (postural orthostatic tachycardia syndrome) 2014   • Stroke (cerebrum) (CMS/HCC)    • Stroke (CMS/HCC)     Blood clot, age 26   • SVT (supraventricular tachycardia) (CMS/HCC)    • Tattoos    • TIA (transient ischemic attack) 09/19/2019     Past Surgical History:   Procedure Laterality Date   • CERVICAL BIOPSY  W/ LOOP ELECTRODE EXCISION       Outpatient Medications Prior to Visit   Medication Sig Dispense Refill   • acyclovir (ZOVIRAX) 400 MG tablet Take 400 mg by mouth Every 4 (Four)  Hours While Awake. Take no more than 5 doses a day. Take as needed     • Eliquis 5 MG tablet tablet TAKE 1 TABLET BY MOUTH EVERY 12 HOURS 180 tablet 0   • Ferrous Sulfate (IRON) 325 (65 Fe) MG tablet Take  by mouth 1 (One) Time Per Week.     • tacrolimus (PROTOPIC) 0.1 % ointment Daily.     • vitamin B-12 (CYANOCOBALAMIN) 100 MCG tablet Take 50 mcg by mouth Daily.     • norethindrone (MICRONOR) 0.35 MG tablet Take 1 tablet by mouth Daily. 84 tablet 3     No facility-administered medications prior to visit.        Allergies as of 12/01/2020 - Reviewed 12/01/2020   Allergen Reaction Noted   • Epinephrine Other (See Comments) 05/13/2020   • Codeine Rash 03/18/2018     Social History     Socioeconomic History   • Marital status:      Spouse name: Bebo   • Number of children: 3   • Years of education: College   • Highest education level: Not on file   Occupational History   • Occupation:    Tobacco Use   • Smoking status: Never Smoker   • Smokeless tobacco: Never Used   Substance and Sexual Activity   • Alcohol use: No     Frequency: Never     Comment: No caffeine use   • Drug use: No   • Sexual activity: Yes     Partners: Male     Birth control/protection: None     Family History   Problem Relation Age of Onset   • Other Mother         Heart murmur   • Uterine cancer Mother 38   • ADD / ADHD Brother    • Stroke Other    • Alzheimer's disease Other    • Alzheimer's disease Maternal Aunt      Review of Systems   Constitution: Negative for fever, malaise/fatigue (Chronic), weight gain and weight loss.   HENT: Negative for ear pain, hearing loss, nosebleeds and sore throat.    Eyes: Positive for blurred vision (Chronic). Negative for double vision, pain, vision loss in left eye and vision loss in right eye.   Cardiovascular: Positive for dyspnea on exertion and leg swelling (Dependent).        See history of present illness.   Respiratory: Positive for shortness of breath (Chronic). Negative for cough,  "sleep disturbances due to breathing, snoring and wheezing.    Endocrine: Negative for cold intolerance, heat intolerance and polyuria.   Skin: Negative for itching, poor wound healing and rash.   Musculoskeletal: Positive for joint pain and myalgias. Negative for joint swelling.   Gastrointestinal: Negative for abdominal pain, diarrhea, hematochezia, nausea and vomiting.   Genitourinary: Negative for hematuria and hesitancy.   Neurological: Positive for numbness and paresthesias. Negative for seizures.   Psychiatric/Behavioral: Negative for depression. The patient is not nervous/anxious.         Objective:     Vitals:    12/01/20 1253   BP: 110/70   Pulse: 78   Weight: 76.2 kg (168 lb)   Height: 162.6 cm (64\")     Body mass index is 28.84 kg/m².    Vitals signs reviewed.   Constitutional:       Appearance: Well-developed.   Eyes:      General: No scleral icterus.        Right eye: No discharge.      Conjunctiva/sclera: Conjunctivae normal.      Pupils: Pupils are equal, round, and reactive to light.   HENT:      Head: Normocephalic.      Nose: Nose normal.   Neck:      Musculoskeletal: Normal range of motion and neck supple.      Thyroid: No thyromegaly.      Vascular: No JVD.   Pulmonary:      Effort: Pulmonary effort is normal. No respiratory distress.      Breath sounds: Normal breath sounds. No wheezing. No rales.   Cardiovascular:      Normal rate. Regular rhythm.      No gallop.   Edema:     Peripheral edema absent.   Abdominal:      General: Bowel sounds are normal. There is no distension.      Palpations: Abdomen is soft.      Tenderness: There is no abdominal tenderness. There is no rebound.   Musculoskeletal: Normal range of motion.         General: No tenderness.   Skin:     General: Skin is warm and dry.      Findings: No erythema or rash.   Neurological:      Mental Status: Alert and oriented to person, place, and time.   Psychiatric:         Behavior: Behavior normal.         Thought Content: Thought " content normal.         Judgment: Judgment normal.       Lab Review:     ECG 12 Lead    Date/Time: 12/1/2020 1:23 PM  Performed by: Deborah Riggins MD  Authorized by: Deborah Riggins MD   Comparison: compared with previous ECG   Similar to previous ECG  Rhythm: sinus rhythm  Conduction: incomplete left bundle branch block and non-specific intraventricular conduction delay  Other findings: poor R wave progression    Clinical impression: abnormal EKG          Assessment:       Diagnosis Plan   1. PFO (patent foramen ovale)     2. Palpitations  Holter Monitor - 72 Hour Up To 21 Days    Ambulatory Referral to Cardiac Electrophysiology   3. Precordial pain  CT Angiogram Coronary   4. Paroxysmal SVT (supraventricular tachycardia) (CMS/[facility])  Ambulatory Referral to Cardiac Electrophysiology     Plan:       1.  Questionable TIA.  Has a PFO but now on Eliquis.  I told her that if she has concerns about the efficacy of Eliquis she would newly need to discuss this further with her oncologist.  Does not appear she has any new symptoms to it.  She has an appointment coming up with Dr. Tobar in the near future and states she will do so.  2.  Palpitations.  History of arrhythmia on monitor.  I reviewed with her that she does have SVT and some of her palpitations may be due to this however at other times I am doubtful.  She has not had any prolonged monitoring other than the 24 monitor that I can see recently.  Therefore will place a 2-week Zio patch and have referred her to EP service.  Blood pressure is too low to empirically add AV brian blocker therapy.  3.  Chest pain.  We will check a coronary CTA.  4.  Concerns of clot in the heart.  I reviewed prior testing including SHAKILA with her.  We will place a Zio patch.  I discussed with her that she is already on anticoagulant therapy and she will need to discuss her concerns of efficacy with Dr. Childs especially as we did not see intracardiac thrombus on prior SHAKILA with similar concerns.  5.   History of paroxysmal supraventricular tachycardia  6.  HIistory of multiple sclerosis. Followed by Dr Landry  7.  Chronic migraine headaches.  She denies any symptoms of sleep apnea  8.  Antiphospholipid syndrome. Followed by  Dr Burgos  9.  Chronic anticoagulant therapy started in June 2018  10.  Chronic iron deficiency anemia with heavy menses globin 11.5  11.  B12 deficiency       Your medication list          Accurate as of December 1, 2020 11:59 PM. If you have any questions, ask your nurse or doctor.            CONTINUE taking these medications      Instructions Last Dose Given Next Dose Due   acyclovir 400 MG tablet  Commonly known as: ZOVIRAX      Take 400 mg by mouth Every 4 (Four) Hours While Awake. Take no more than 5 doses a day. Take as needed       Eliquis 5 MG tablet tablet  Generic drug: apixaban      TAKE 1 TABLET BY MOUTH EVERY 12 HOURS       ferrous sulfate 325 (65 Fe) MG tablet      Take  by mouth 1 (One) Time Per Week.       tacrolimus 0.1 % ointment  Commonly known as: PROTOPIC      Daily.       vitamin B-12 100 MCG tablet  Commonly known as: CYANOCOBALAMIN      Take 50 mcg by mouth Daily.          STOP taking these medications    norethindrone 0.35 MG tablet  Commonly known as: MICRONOR  Stopped by: Deborah Riggins MD             Patient is no longer taking micronor.  I corrected the med list to reflect this.  I did not stop these medications.    Dictated utilizing Dragon dictation

## 2020-12-02 DIAGNOSIS — R00.2 PALPITATIONS: Primary | ICD-10-CM

## 2020-12-06 PROBLEM — R07.2 PRECORDIAL PAIN: Status: ACTIVE | Noted: 2020-12-06

## 2020-12-06 PROBLEM — I47.10 PAROXYSMAL SVT (SUPRAVENTRICULAR TACHYCARDIA): Status: ACTIVE | Noted: 2020-12-06

## 2020-12-06 PROBLEM — Q21.12 PFO (PATENT FORAMEN OVALE): Status: ACTIVE | Noted: 2020-12-06

## 2020-12-06 PROBLEM — I47.1 PAROXYSMAL SVT (SUPRAVENTRICULAR TACHYCARDIA): Status: ACTIVE | Noted: 2020-12-06

## 2020-12-28 ENCOUNTER — TELEPHONE (OUTPATIENT)
Dept: ONCOLOGY | Facility: CLINIC | Age: 40
End: 2020-12-28

## 2020-12-28 ENCOUNTER — TELEPHONE (OUTPATIENT)
Dept: ONCOLOGY | Facility: HOSPITAL | Age: 40
End: 2020-12-28

## 2020-12-28 NOTE — TELEPHONE ENCOUNTER
Call [phone] patient having outside labs drawn tomorrow for Dr Mehta's office, she has yearly labs with us to be drawn 1/21/21. She states she feels anemic and wants those labs moved sooner if the RN feels she should.    Raul

## 2020-12-28 NOTE — TELEPHONE ENCOUNTER
Caller: Sharyn     Relationship to patient: Pt    Best call back number: [phone]    Type of visit: Lab     Requested date: ASAP    If rescheduling, when is the original appointment: 01/21     Additional notes: Pt's neurologist sent order over for labs so pt's requesting to have those done on the same day as the labs that Dr. Burgos ordered

## 2020-12-28 NOTE — TELEPHONE ENCOUNTER
Spoke with pt who had an appointment for 1/21/21 to evaluate anemia. She is currently symptomatic. Message sent to scheduling to change lab appointment to tomorrow.Pt aware and v/u.

## 2020-12-29 ENCOUNTER — LAB (OUTPATIENT)
Dept: OTHER | Facility: HOSPITAL | Age: 40
End: 2020-12-29

## 2020-12-29 DIAGNOSIS — D50.0 IRON DEFICIENCY ANEMIA DUE TO CHRONIC BLOOD LOSS: ICD-10-CM

## 2020-12-29 DIAGNOSIS — E53.8 B12 DEFICIENCY: ICD-10-CM

## 2020-12-29 LAB
BASOPHILS # BLD AUTO: 0.03 10*3/MM3 (ref 0–0.2)
BASOPHILS NFR BLD AUTO: 0.4 % (ref 0–1.5)
DEPRECATED RDW RBC AUTO: 42.6 FL (ref 37–54)
EOSINOPHIL # BLD AUTO: 0.22 10*3/MM3 (ref 0–0.4)
EOSINOPHIL NFR BLD AUTO: 2.7 % (ref 0.3–6.2)
ERYTHROCYTE [DISTWIDTH] IN BLOOD BY AUTOMATED COUNT: 13.3 % (ref 12.3–15.4)
FERRITIN SERPL-MCNC: 42.8 NG/ML (ref 13–150)
HCT VFR BLD AUTO: 33.3 % (ref 34–46.6)
HGB BLD-MCNC: 10.6 G/DL (ref 12–15.9)
IMM GRANULOCYTES # BLD AUTO: 0.03 10*3/MM3 (ref 0–0.05)
IMM GRANULOCYTES NFR BLD AUTO: 0.4 % (ref 0–0.5)
IRON 24H UR-MRATE: 30 MCG/DL (ref 37–145)
IRON SATN MFR SERPL: 9 % (ref 20–50)
LYMPHOCYTES # BLD AUTO: 2.11 10*3/MM3 (ref 0.7–3.1)
LYMPHOCYTES NFR BLD AUTO: 25.6 % (ref 19.6–45.3)
MCH RBC QN AUTO: 28.1 PG (ref 26.6–33)
MCHC RBC AUTO-ENTMCNC: 31.8 G/DL (ref 31.5–35.7)
MCV RBC AUTO: 88.3 FL (ref 79–97)
MONOCYTES # BLD AUTO: 0.37 10*3/MM3 (ref 0.1–0.9)
MONOCYTES NFR BLD AUTO: 4.5 % (ref 5–12)
NEUTROPHILS NFR BLD AUTO: 5.47 10*3/MM3 (ref 1.7–7)
NEUTROPHILS NFR BLD AUTO: 66.4 % (ref 42.7–76)
NRBC BLD AUTO-RTO: 0 /100 WBC (ref 0–0.2)
PLATELET # BLD AUTO: 360 10*3/MM3 (ref 140–450)
PMV BLD AUTO: 8.8 FL (ref 6–12)
RBC # BLD AUTO: 3.77 10*6/MM3 (ref 3.77–5.28)
TIBC SERPL-MCNC: 335 MCG/DL (ref 298–536)
TRANSFERRIN SERPL-MCNC: 225 MG/DL (ref 200–360)
VIT B12 BLD-MCNC: 916 PG/ML (ref 211–946)
WBC # BLD AUTO: 8.23 10*3/MM3 (ref 3.4–10.8)

## 2020-12-29 PROCEDURE — 36415 COLL VENOUS BLD VENIPUNCTURE: CPT

## 2020-12-29 PROCEDURE — 82607 VITAMIN B-12: CPT | Performed by: INTERNAL MEDICINE

## 2020-12-29 PROCEDURE — 82728 ASSAY OF FERRITIN: CPT | Performed by: INTERNAL MEDICINE

## 2020-12-29 PROCEDURE — 85025 COMPLETE CBC W/AUTO DIFF WBC: CPT | Performed by: INTERNAL MEDICINE

## 2020-12-29 PROCEDURE — 84466 ASSAY OF TRANSFERRIN: CPT | Performed by: INTERNAL MEDICINE

## 2020-12-29 PROCEDURE — 83540 ASSAY OF IRON: CPT | Performed by: INTERNAL MEDICINE

## 2021-01-04 ENCOUNTER — TELEPHONE (OUTPATIENT)
Dept: OBSTETRICS AND GYNECOLOGY | Age: 41
End: 2021-01-04

## 2021-01-04 NOTE — TELEPHONE ENCOUNTER
Called and discussed with her, she just recently had blood count checked and does not appear to have current life-threatening anemia.    She states that she has somewhat heavy menses that are mostly monthly, but in March and in December they were significantly more heavy.  I discussed some options to help with her heavy menses such as progestin only contraceptive, Mirena IUD.  I had previously prescribed progestin only contraceptive, but she states she cannot take this.  States she had life-threatening complications with the IUD.  Discussed that she would not be able to get pregnant after an ablation or hysterectomy and so these would not be good options either.   Therefore she can discuss iron infusion with her hematologist and revisit use of anticoagulant if it is leading to such significant menstrual bleeding without good options for control that would preserve her fertility.     Ivory Gagnon MD  1/4/2021  16:53 EST

## 2021-01-04 NOTE — TELEPHONE ENCOUNTER
(Chelsi pt) Pt called, heavy bleeding started December 10th thru 12/28/2020.  Pt states she was using two pads an hour.  Pt was passing alot of large blood clots.  Bleeding has stopped now 1/1/2021.  Pt has APS and has trouble walking.  Pt is waiting to get an iron infusion scheduled.   Pt would prefer a telephone visit over coming in because she so light headed and has trouble walking w/o feeling like she is going to pass out.     Please advise.    [phone]

## 2021-01-18 ENCOUNTER — HOSPITAL ENCOUNTER (OUTPATIENT)
Dept: CT IMAGING | Facility: HOSPITAL | Age: 41
Discharge: HOME | End: 2021-01-18

## 2021-01-18 NOTE — NURSING NOTE
Pt was in xray triage for CTA, RN explained procedure to patient including the possibility of giving IV meds to lower heart rate, Pt concerned that she would have another adverse reaction and stated she would feel better if she could reschedule procedure; RN gave pt the number to scheduling

## 2021-01-20 RX ORDER — APIXABAN 5 MG/1
TABLET, FILM COATED ORAL
Qty: 180 TABLET | Refills: 0 | Status: SHIPPED | OUTPATIENT
Start: 2021-01-20 | End: 2021-03-15

## 2021-01-21 ENCOUNTER — APPOINTMENT (OUTPATIENT)
Dept: OTHER | Facility: HOSPITAL | Age: 41
End: 2021-01-21

## 2021-01-21 ENCOUNTER — TELEMEDICINE (OUTPATIENT)
Dept: ONCOLOGY | Facility: CLINIC | Age: 41
End: 2021-01-21

## 2021-01-21 ENCOUNTER — TELEPHONE (OUTPATIENT)
Dept: ONCOLOGY | Facility: CLINIC | Age: 41
End: 2021-01-21

## 2021-01-21 DIAGNOSIS — D68.59 HYPERCOAGULATION SYNDROME (HCC): Primary | ICD-10-CM

## 2021-01-21 DIAGNOSIS — D50.0 IRON DEFICIENCY ANEMIA DUE TO CHRONIC BLOOD LOSS: ICD-10-CM

## 2021-01-21 PROCEDURE — 99214 OFFICE O/P EST MOD 30 MIN: CPT | Performed by: NURSE PRACTITIONER

## 2021-01-21 NOTE — TELEPHONE ENCOUNTER
----- Message from SUE Posadas sent at 1/21/2021  3:18 PM EST -----  Please schedule this patient to come in and see me next Friday at [place] with CBC, stat ferritin and iron profile, CRP, sed rate, AWNG and possible Injectafer.    Theresa, I have ordered the labs, can you please enter the care plan for Injectafer?    Thanks    Anyi

## 2021-01-21 NOTE — PROGRESS NOTES
.     REASON FOR FOLLOW UP:     1.  Anemia and possible blood clots with suspected episodes of strokes.   2.  Laboratory study on 5/31/2018 reported significantly elevated anti-phosphatidylserine IgM at 57, and marginally elevated anticardiolipin IgM 14.  All others were negative.   3.  Laboratory tests confirmed vitamin B12 deficiency and iron deficiency on 5/31/2018.    4.  Repeated laboratory study on 9/13/2018 showed persistently significantly elevated anti-phosphatidylserine IgM at 65.  She also has persistent significant iron deficiency and vitamin B12 deficiency.   5.  Patient was not able to tolerate oral iron due to significant constipation, she was given 2 doses Injectafer in end of September 2018, with significant improved iron panel and normalization of hemoglobin in December 2018.   6.  Patient had recurrent iron deficiency in June 2019 due to heavy menses.  Patient was given Injectafer weekly for 2 doses.  Her vitamin B12 level improved at 404 on 6/24/2019.    7.  Patient now taking vitamin B12 supplement with great tolerance.     HISTORY OF PRESENT ILLNESS:  The patient is a 41 y.o. year old female with the above-mentioned history, who I performed a video visit with today for annual follow-up.  She currently continues on Eliquis 5 mg twice daily.  Historically, she was on a dose reduced Eliquis 2.5 mg twice daily.  However, in April 2020, she contacted the office with reports of increased shortness of air, chest tightness and intermittent left leg pain.  She requested an increased dose of Eliquis, therefore resumed 5 mg twice daily.  This is since been continued.    Today, she reports unfortunately, she has felt poorly over the past few months.  This in part is related to ongoing neurologic issues.  She does have a history of multiple sclerosis.  She has been seen by neurology with a recent MRI indicating demyelination.  She does have lower extremity weakness.  Again neurology is working this  up.    She has been lost in follow-up several times.  She did have labs obtained 12/28/2020 which documented iron deficiency with a hemoglobin of 10.6, ferritin 42, iron saturation 9%.  She is currently taking 1 tablet of ferrous sulfate though is struggling to tolerate this.    She does report a prolonged menstrual cycle lasting approximately 18 days which resulted in fairly significant fatigue.  She did stop her Eliquis the last few days of her menstrual cycle though this did not impact her bleeding.  She has since resumed Eliquis without issue.  She otherwise denies signs or symptoms of bleeding.    HEMATOLOGIC/ONCOLOGIC HISTORY:  The patient is a 38 y.o. year old female who is here for initial evaluation with the above-mentioned history on 5/31/2018.    Patient presented for evaluation reported by her primary care physician Dr. Bravo because of recurrent episodes of blurry visions.  Patient also has abnormal brain MRI examination on multiple occasions.  She was origin of diagnosis of multiple sclerosis, however was told by urologist Dr. Khanna that she did not have multiple sclerosis.  Most recently patient was seen by a different neurologist Dr. Scott that her MRI images did not fit with typical MS changes, instead it may be changes related to thrombotic stroke.  This patient has chronic migraine headache, and was taking Topamax low-dose with good results but with side effects and the medicine was recently changed to Trokendl XR.     This patient also has history of iron deficiency for which she has been taking oral iron for many years.  She also had vitamin B12 deficiency.  Her laboratory study on 11/20/2017 clearly reported iron deficiency with ferritin 8 ng/mL, and she was already mildly anemic with hemoglobin 11.3, and MCV 82.9. She had a normal WBC 6700 including neutrophils 3850 and lymphocytes 2190, monocytes 420.  Her platelets was 323,000.     Laboratory study on 3/23/2018 reported hemoglobin 11.6,  MCV 83.1 MCHC 33.6.  Her WBC was 7900 including neutrophils 5240, lymphocytes 1980, monocytes 500.  Her platelets were 341,000.  Her iron studies reported free iron 106 TIBC 365 and iron saturation 29%, vitamin B12 at 242 pg/mL and vitamin D 20 ng/mL.  There was no ferritin level nor folic acid level.      Laboratory studies we obtained on 05/31/2018 showed significantly elevated antiphosphatidylserine antibody IgM subtype at 57, but negative for IgG and IgA subtype.  She is also marginally positive for anticardiolipin IgM at 14, but negative for IgG subtype.  She was tested negative for other hypercoagulable tests including anti-Beta-2 glycoprotein 1 antibodies, negative for lupus anticoagulant and normal antithrombin 103%, normal protein C activity 117%, normal protein S activity 81% with free protein S antigen 90%.  She is also negative for factor II mutation and factor V Leiden mutation.  We also checked her for comprehensive rheumatoid panel which is all negative as well as negative for the rheumatoid factor and direct WANG.  She also had iron deficiency, ferritin 12.5, iron saturation 12% 05/31/2018.     The patient presented on 6/14/2018 for reevaluation and to discuss laboratory results obtained on 05/31/2018 when I saw her for the 1st time for evaluation of possible thrombosis as well as persistent anemia.  The patient reports no changes in her clinical condition the past 6 days.  No recent episodes of blurry vision.  She is on low-dose aspirin 81 mg daily.  The patient also has been on low-dose oral iron 28 mg daily with good compliance.  She was not able to tolerate the large dose of ferrous sulfate at 65 mg tablets because of significant constipation.  The patient is not really compliant with oral vitamin B12 supplementation.    We started patient on Eliquis 5 mg twice a day on 6/14/2018.  This patient is likely having antiphospholipid antibody syndrome.    In September 2018, patient reports she has no  recurrence of blurry vision, no headaches and dizziness since she started on Eliquis 5 mg twice a day in middle June 2018.  She feels more fit.  Her  also reports patient is more energetic in the past 3 months.  Patient reports no easy bleeding or bruising.  She does note somewhat increased volume of her menses.    Patient reports she was not able to tolerate even low-dose oral iron with significant constipation.  She also reports vitamin B12 causes palpitation.  So currently she is not taking oral iron or vitamin B-12.      Laboratory results on 9/13/2018 reported persistently elevated anti-phosphatidylserine IgM at 65, however resolution of mildly elevated anticardiolipin IgM antibody.  She has persistent iron deficiency with ferritin 8 ng/mL, iron saturation 7%, both of which actually worse than those levels on 5/31/2018.  Her B12 level also is low at 236 pg/mL.  She has worsening anemia with hemoglobin 11.2 but maintains normal WBC 6500 and platelets 292,000.     Patient had IV Injectafer at the end of September and early October.  She reports improved energy level.      Laboratory studies on 12/13/2018 reported normalized hemoglobin at 12.8, MCV 88.0, MCHC 33.4.  She maintains normal platelets at 262,000 and WBC 7670.  Her iron study showed significantly improved ferritin 206 ng/mL and iron saturation 18%.  Her vitamin B12 level 264 pg/mL.      On 6/24/2019, the patient complained of recent palpitations, and was seen by a cardiologist, who prescribed her metoprolol 25 mg, however she has not started it yet.      Her laboratory study on 6/24/2019 reported normal hemoglobin 12.4, MCV 87.7, normal platelets 296,000 and WBC 6090.  It also reported recurrent iron deficiency with a ferritin 36.8, and iron saturation 10% free iron 34 TIBC 326.  Slightly improved vitamin B12 level at 404 pg/mL.  she has recurrent iron deficiency, most likely due to her heavy menses.  Patient was not able to tolerate oral iron.      Patient was given Injectafer 2 doses in July 2019.            There were no vitals filed for this visit.  Current Status 1/23/2020   ECOG score 0     PHYSICAL EXAM:    Video visit performed today.  The patient is alert and oriented in no acute distress.  Breathing appears unlabored.  Answers questions appropriately.    RECENT LABS:    Lab Results   Component Value Date    WBC 8.23 12/29/2020    HGB 10.6 (L) 12/29/2020    HCT 33.3 (L) 12/29/2020    MCV 88.3 12/29/2020     12/29/2020     Lab Results   Component Value Date    NEUTROABS 5.47 12/29/2020       Lab Results   Component Value Date    IRON 30 (L) 12/29/2020    TIBC 335 12/29/2020    FERRITIN 42.80 12/29/2020       Assessment/Plan      1.  Antiphospholipid antibody syndrome, with significantly anti-phosphatidylserine IgM antibody twice more than 3 months apart.    · This patient had symptoms with blurry vision, bilateral, ? Cerebrovascular accident (CVA).  This patient has multiple major complaints, and was thought related to multiple sclerosis, however she was told by 2 different neurologists that the MRI changes did not fit with multiple sclerosis.  Instead it may be more of stroke.  This patient also has intermittent lower extremity edema although no previous evidence of venous thrombosis.   · May 2018 significantly elevated antiphosphatidylserine antibody IgM at 57.  We repeated laboratory study on 9/13/2018, she had persistently elevated anti-phosphatidylserine antibody IgM 65.  So she has antiphospholipid syndrome.    · She initiated Eliquis 5 mg twice daily mid June 2018.  This resulted in resolution of blurry vision and numbness of her hands  · Eliquis was then dose reduced to 2.5 mg twice daily due to heavy menses and recurrent iron deficiency  · April 2020, she resumed 5 mg twice daily due to chest tightness and lower extremity swelling       2.  Iron deficiency anemia, secondary to heavy menses.    · Intolerant to oral iron with  significant constipation  · We treated her with 2 doses of intravenous iron with Injectofar in end of September and early October 2018.   · She had IV iron in late June and early July 2019 because recurrent iron deficiency.    · She reports in December and 18-day heavy menses.  She did discuss with GYN who recommended ablation, nonhormonal IUD or hysterectomy.  · Most recent labs 12/29/2020 with recurrent iron deficiency, ferritin of 42, iron saturation of 9%, hemoglobin 10.6  · I have asked the patient to come into the office next week for repeat labs and possible Injectafer      3.  B12 deficiency.    · This patient has history of B12 deficiency and was given B12 injection previously.    · She continues on oral B12 1000 mcg daily with a B12 level within normal limits 9/29/2020, 916    4.  Progressive neurologic symptoms  · The patient reports a decline over the past 2 months with unexpected weight loss, lower extremity weakness, development of a stutter  · She is following up with neurology related to her multiple sclerosis, who does not feel her symptoms are related to her MS as her most recent MRI was fairly stable  · Patient question symptomatology related to antiphospholipid syndrome or other autoimmune etiology    PLAN:      1. Continue Eliquis 5 mg twice daily  2. I reviewed laboratory values from 12/19/2020 which continue to document iron deficiency  3. Patient will return to the office next week for CBC, CMP, ferritin, iron profile, sedimentation rate and C-reactive protein we will schedule her for Injectafer at that time  4. I will further discuss with Dr. Burgos additional labs he would like to obtain in regards to her antiphospholipid syndrome.  5. MD follow-up pending labs next week    Video visit was performed with the patient today, a total of 30 minutes spent reviewing the patient, 10 minutes spent with the patient via video.  The remainder of the time spent in reviewing records from multiple  specialists including GYN, neurology, and documentation.  The patient was new to me as her provider.    Anyi Friend, APRN   01/21/2021

## 2021-01-28 DIAGNOSIS — D68.61 ANTIPHOSPHOLIPID ANTIBODY SYNDROME (HCC): ICD-10-CM

## 2021-01-28 DIAGNOSIS — D68.59 HYPERCOAGULATION SYNDROME (HCC): Primary | ICD-10-CM

## 2021-01-29 ENCOUNTER — OFFICE VISIT (OUTPATIENT)
Dept: ONCOLOGY | Facility: CLINIC | Age: 41
End: 2021-01-29

## 2021-01-29 ENCOUNTER — LAB (OUTPATIENT)
Dept: OTHER | Facility: HOSPITAL | Age: 41
End: 2021-01-29

## 2021-01-29 ENCOUNTER — TELEPHONE (OUTPATIENT)
Dept: ONCOLOGY | Facility: CLINIC | Age: 41
End: 2021-01-29

## 2021-01-29 ENCOUNTER — INFUSION (OUTPATIENT)
Dept: ONCOLOGY | Facility: HOSPITAL | Age: 41
End: 2021-01-29

## 2021-01-29 VITALS
DIASTOLIC BLOOD PRESSURE: 72 MMHG | SYSTOLIC BLOOD PRESSURE: 103 MMHG | RESPIRATION RATE: 20 BRPM | BODY MASS INDEX: 28.34 KG/M2 | TEMPERATURE: 97.8 F | OXYGEN SATURATION: 100 % | WEIGHT: 166 LBS | HEART RATE: 89 BPM | HEIGHT: 64 IN

## 2021-01-29 DIAGNOSIS — D68.59 HYPERCOAGULATION SYNDROME (HCC): ICD-10-CM

## 2021-01-29 DIAGNOSIS — D68.61 ANTIPHOSPHOLIPID ANTIBODY SYNDROME (HCC): ICD-10-CM

## 2021-01-29 DIAGNOSIS — D50.0 IRON DEFICIENCY ANEMIA DUE TO CHRONIC BLOOD LOSS: ICD-10-CM

## 2021-01-29 DIAGNOSIS — D68.61 ANTIPHOSPHOLIPID ANTIBODY SYNDROME (HCC): Primary | ICD-10-CM

## 2021-01-29 LAB
ALBUMIN SERPL-MCNC: 4.2 G/DL (ref 3.5–5.2)
ALBUMIN/GLOB SERPL: 1.1 G/DL
ALP SERPL-CCNC: 82 U/L (ref 39–117)
ALT SERPL W P-5'-P-CCNC: 17 U/L (ref 1–33)
ANION GAP SERPL CALCULATED.3IONS-SCNC: 4 MMOL/L (ref 5–15)
AST SERPL-CCNC: 14 U/L (ref 1–32)
BASOPHILS # BLD AUTO: 0.04 10*3/MM3 (ref 0–0.2)
BASOPHILS NFR BLD AUTO: 0.5 % (ref 0–1.5)
BILIRUB SERPL-MCNC: 0.4 MG/DL (ref 0–1.2)
BUN SERPL-MCNC: 9 MG/DL (ref 6–20)
BUN/CREAT SERPL: 11.3 (ref 7–25)
CALCIUM SPEC-SCNC: 9.2 MG/DL (ref 8.6–10.5)
CHLORIDE SERPL-SCNC: 106 MMOL/L (ref 98–107)
CHROMATIN AB SERPL-ACNC: <10 IU/ML (ref 0–14)
CO2 SERPL-SCNC: 27 MMOL/L (ref 22–29)
CREAT SERPL-MCNC: 0.8 MG/DL (ref 0.57–1)
CRP SERPL-MCNC: <0.3 MG/DL (ref 0–0.5)
DEPRECATED RDW RBC AUTO: 39.6 FL (ref 37–54)
EOSINOPHIL # BLD AUTO: 0.14 10*3/MM3 (ref 0–0.4)
EOSINOPHIL NFR BLD AUTO: 1.7 % (ref 0.3–6.2)
ERYTHROCYTE [DISTWIDTH] IN BLOOD BY AUTOMATED COUNT: 12.3 % (ref 12.3–15.4)
ERYTHROCYTE [SEDIMENTATION RATE] IN BLOOD: 48 MM/HR (ref 0–20)
FERRITIN SERPL-MCNC: 29.6 NG/ML (ref 13–150)
GFR SERPL CREATININE-BSD FRML MDRD: 79 ML/MIN/1.73
GLOBULIN UR ELPH-MCNC: 3.7 GM/DL
GLUCOSE SERPL-MCNC: 126 MG/DL (ref 65–99)
HCT VFR BLD AUTO: 38 % (ref 34–46.6)
HGB BLD-MCNC: 12 G/DL (ref 12–15.9)
IMM GRANULOCYTES # BLD AUTO: 0.03 10*3/MM3 (ref 0–0.05)
IMM GRANULOCYTES NFR BLD AUTO: 0.4 % (ref 0–0.5)
IRON 24H UR-MRATE: 28 MCG/DL (ref 37–145)
IRON SATN MFR SERPL: 8 % (ref 20–50)
LYMPHOCYTES # BLD AUTO: 2.26 10*3/MM3 (ref 0.7–3.1)
LYMPHOCYTES NFR BLD AUTO: 28 % (ref 19.6–45.3)
MCH RBC QN AUTO: 27.9 PG (ref 26.6–33)
MCHC RBC AUTO-ENTMCNC: 31.6 G/DL (ref 31.5–35.7)
MCV RBC AUTO: 88.4 FL (ref 79–97)
MONOCYTES # BLD AUTO: 0.41 10*3/MM3 (ref 0.1–0.9)
MONOCYTES NFR BLD AUTO: 5.1 % (ref 5–12)
NEUTROPHILS NFR BLD AUTO: 5.2 10*3/MM3 (ref 1.7–7)
NEUTROPHILS NFR BLD AUTO: 64.3 % (ref 42.7–76)
NRBC BLD AUTO-RTO: 0 /100 WBC (ref 0–0.2)
PLATELET # BLD AUTO: 328 10*3/MM3 (ref 140–450)
PMV BLD AUTO: 9.5 FL (ref 6–12)
POTASSIUM SERPL-SCNC: 4 MMOL/L (ref 3.5–5.2)
PROT SERPL-MCNC: 7.9 G/DL (ref 6–8.5)
RBC # BLD AUTO: 4.3 10*6/MM3 (ref 3.77–5.28)
SODIUM SERPL-SCNC: 137 MMOL/L (ref 136–145)
TIBC SERPL-MCNC: 367 MCG/DL (ref 298–536)
TRANSFERRIN SERPL-MCNC: 246 MG/DL (ref 200–360)
WBC # BLD AUTO: 8.08 10*3/MM3 (ref 3.4–10.8)

## 2021-01-29 PROCEDURE — 86235 NUCLEAR ANTIGEN ANTIBODY: CPT | Performed by: NURSE PRACTITIONER

## 2021-01-29 PROCEDURE — 85652 RBC SED RATE AUTOMATED: CPT | Performed by: NURSE PRACTITIONER

## 2021-01-29 PROCEDURE — 80053 COMPREHEN METABOLIC PANEL: CPT | Performed by: NURSE PRACTITIONER

## 2021-01-29 PROCEDURE — 86225 DNA ANTIBODY NATIVE: CPT | Performed by: NURSE PRACTITIONER

## 2021-01-29 PROCEDURE — 84466 ASSAY OF TRANSFERRIN: CPT | Performed by: NURSE PRACTITIONER

## 2021-01-29 PROCEDURE — 99214 OFFICE O/P EST MOD 30 MIN: CPT | Performed by: NURSE PRACTITIONER

## 2021-01-29 PROCEDURE — 83540 ASSAY OF IRON: CPT | Performed by: NURSE PRACTITIONER

## 2021-01-29 PROCEDURE — 86140 C-REACTIVE PROTEIN: CPT | Performed by: NURSE PRACTITIONER

## 2021-01-29 PROCEDURE — 86431 RHEUMATOID FACTOR QUANT: CPT | Performed by: NURSE PRACTITIONER

## 2021-01-29 PROCEDURE — 85025 COMPLETE CBC W/AUTO DIFF WBC: CPT | Performed by: NURSE PRACTITIONER

## 2021-01-29 PROCEDURE — 82728 ASSAY OF FERRITIN: CPT | Performed by: NURSE PRACTITIONER

## 2021-01-29 PROCEDURE — 36415 COLL VENOUS BLD VENIPUNCTURE: CPT

## 2021-01-29 NOTE — PROGRESS NOTES
.   REASON FOR FOLLOW UP:     1.  Anemia and possible blood clots with suspected episodes of strokes.   2.  Laboratory study on 5/31/2018 reported significantly elevated anti-phosphatidylserine IgM at 57, and marginally elevated anticardiolipin IgM 14.  All others were negative.   3.  Laboratory tests confirmed vitamin B12 deficiency and iron deficiency on 5/31/2018.    4.  Repeated laboratory study on 9/13/2018 showed persistently significantly elevated anti-phosphatidylserine IgM at 65.  She also has persistent significant iron deficiency and vitamin B12 deficiency.   5.  Patient was not able to tolerate oral iron due to significant constipation, she was given 2 doses Injectafer in end of September 2018, with significant improved iron panel and normalization of hemoglobin in December 2018.   6.  Patient had recurrent iron deficiency in June 2019 due to heavy menses.  Patient was given Injectafer weekly for 2 doses.  Her vitamin B12 level improved at 404 on 6/24/2019.    7.  Patient now taking vitamin B12 supplement with great tolerance.     HISTORY OF PRESENT ILLNESS:  The patient is a 41 y.o. year old female with the above-mentioned history, she returns the office today for 1 week follow-up.  She was evaluated last week with a video visit with reports of feeling poorly, she was therefore brought in for further evaluation and repeat labs.    In December, she reports she had a heavy menses which lasted approximately 18 days.  She did hold her anticoagulation with no improvement in her menses.  She questions her need for iron.  She has attempted to take oral iron though is intolerant with abdominal discomfort.    She reports she has developed progressive neurologic symptoms with a stutter and generalized weakness.  She does regularly follow-up with neurology.  The patient is quite adamant that her symptoms are related to her antiphospholipid syndrome.  She is requesting referral to rheumatology today.    She  "does continue on Eliquis 5 mg twice daily with good tolerance.  She reports she is still \"clotting\" with intermittent discomfort in her upper and lower extremities which she attributes to blood clots.  Presently, she has no extremity pain, swelling or erythema.  She does continue on B12 supplementation.      HEMATOLOGIC/ONCOLOGIC HISTORY:  The patient is a 38 y.o. year old female who is here for initial evaluation with the above-mentioned history on 5/31/2018.    Patient presented for evaluation reported by her primary care physician Dr. Bravo because of recurrent episodes of blurry visions.  Patient also has abnormal brain MRI examination on multiple occasions.  She was origin of diagnosis of multiple sclerosis, however was told by urologist Dr. Khanna that she did not have multiple sclerosis.  Most recently patient was seen by a different neurologist Dr. Scott that her MRI images did not fit with typical MS changes, instead it may be changes related to thrombotic stroke.  This patient has chronic migraine headache, and was taking Topamax low-dose with good results but with side effects and the medicine was recently changed to Trokendl XR.     This patient also has history of iron deficiency for which she has been taking oral iron for many years.  She also had vitamin B12 deficiency.  Her laboratory study on 11/20/2017 clearly reported iron deficiency with ferritin 8 ng/mL, and she was already mildly anemic with hemoglobin 11.3, and MCV 82.9. She had a normal WBC 6700 including neutrophils 3850 and lymphocytes 2190, monocytes 420.  Her platelets was 323,000.     Laboratory study on 3/23/2018 reported hemoglobin 11.6, MCV 83.1 MCHC 33.6.  Her WBC was 7900 including neutrophils 5240, lymphocytes 1980, monocytes 500.  Her platelets were 341,000.  Her iron studies reported free iron 106 TIBC 365 and iron saturation 29%, vitamin B12 at 242 pg/mL and vitamin D 20 ng/mL.  There was no ferritin level nor folic acid " level.      Laboratory studies we obtained on 05/31/2018 showed significantly elevated antiphosphatidylserine antibody IgM subtype at 57, but negative for IgG and IgA subtype.  She is also marginally positive for anticardiolipin IgM at 14, but negative for IgG subtype.  She was tested negative for other hypercoagulable tests including anti-Beta-2 glycoprotein 1 antibodies, negative for lupus anticoagulant and normal antithrombin 103%, normal protein C activity 117%, normal protein S activity 81% with free protein S antigen 90%.  She is also negative for factor II mutation and factor V Leiden mutation.  We also checked her for comprehensive rheumatoid panel which is all negative as well as negative for the rheumatoid factor and direct WANG.  She also had iron deficiency, ferritin 12.5, iron saturation 12% 05/31/2018.     The patient presented on 6/14/2018 for reevaluation and to discuss laboratory results obtained on 05/31/2018 when I saw her for the 1st time for evaluation of possible thrombosis as well as persistent anemia.  The patient reports no changes in her clinical condition the past 6 days.  No recent episodes of blurry vision.  She is on low-dose aspirin 81 mg daily.  The patient also has been on low-dose oral iron 28 mg daily with good compliance.  She was not able to tolerate the large dose of ferrous sulfate at 65 mg tablets because of significant constipation.  The patient is not really compliant with oral vitamin B12 supplementation.    We started patient on Eliquis 5 mg twice a day on 6/14/2018.  This patient is likely having antiphospholipid antibody syndrome.    In September 2018, patient reports she has no recurrence of blurry vision, no headaches and dizziness since she started on Eliquis 5 mg twice a day in middle June 2018.  She feels more fit.  Her  also reports patient is more energetic in the past 3 months.  Patient reports no easy bleeding or bruising.  She does note somewhat increased  "volume of her menses.    Patient reports she was not able to tolerate even low-dose oral iron with significant constipation.  She also reports vitamin B12 causes palpitation.  So currently she is not taking oral iron or vitamin B-12.      Laboratory results on 9/13/2018 reported persistently elevated anti-phosphatidylserine IgM at 65, however resolution of mildly elevated anticardiolipin IgM antibody.  She has persistent iron deficiency with ferritin 8 ng/mL, iron saturation 7%, both of which actually worse than those levels on 5/31/2018.  Her B12 level also is low at 236 pg/mL.  She has worsening anemia with hemoglobin 11.2 but maintains normal WBC 6500 and platelets 292,000.     Patient had IV Injectafer at the end of September and early October.  She reports improved energy level.      Laboratory studies on 12/13/2018 reported normalized hemoglobin at 12.8, MCV 88.0, MCHC 33.4.  She maintains normal platelets at 262,000 and WBC 7670.  Her iron study showed significantly improved ferritin 206 ng/mL and iron saturation 18%.  Her vitamin B12 level 264 pg/mL.      On 6/24/2019, the patient complained of recent palpitations, and was seen by a cardiologist, who prescribed her metoprolol 25 mg, however she has not started it yet.      Her laboratory study on 6/24/2019 reported normal hemoglobin 12.4, MCV 87.7, normal platelets 296,000 and WBC 6090.  It also reported recurrent iron deficiency with a ferritin 36.8, and iron saturation 10% free iron 34 TIBC 326.  Slightly improved vitamin B12 level at 404 pg/mL.  she has recurrent iron deficiency, most likely due to her heavy menses.  Patient was not able to tolerate oral iron.     Patient was given Injectafer 2 doses in July 2019.            Vitals:    01/29/21 0838   Height: 162.6 cm (64.02\")     Current Status 1/23/2020   ECOG score 0     PHYSICAL EXAM:    GENERAL:  Well-developed, well-nourished in no acute distress.   SKIN:  Warm, dry without rashes, purpura or " petechiae.  HEAD:  Normocephalic.  EYES:  Pupils equal, round.  EOMs intact.  Conjunctivae normal.  CHEST: Breathing unlabored, no acute distress  EXTREMITIES:  No clubbing, cyanosis or edema.  No signs of thrombosis in bilateral upper or lower extremity  NEUROLOGICAL: No focal neurological deficits.  The patient speech is appropriate during our visit today  PSYCHIATRIC:  Normal affect and mood.        RECENT LABS:  Results from last 7 days   Lab Units 01/29/21  0843   WBC 10*3/mm3 8.08   NEUTROS ABS 10*3/mm3 5.20   HEMOGLOBIN g/dL 12.0   HEMATOCRIT % 38.0   PLATELETS 10*3/mm3 328     Results from last 7 days   Lab Units 01/29/21  0843   SODIUM mmol/L 137   POTASSIUM mmol/L 4.0   CHLORIDE mmol/L 106   CO2 mmol/L 27.0   BUN mg/dL 9   CREATININE mg/dL 0.80   CALCIUM mg/dL 9.2   ALBUMIN g/dL 4.20   BILIRUBIN mg/dL 0.4   ALK PHOS U/L 82   ALT (SGPT) U/L 17   AST (SGOT) U/L 14   GLUCOSE mg/dL 126*   FERRITIN ng/mL 29.60   IRON mcg/dL 28*   TIBC mcg/dL 367           Assessment/Plan      1.  Antiphospholipid antibody syndrome, with significantly anti-phosphatidylserine IgM antibody twice more than 3 months apart.    · This patient had symptoms with blurry vision, bilateral, ? Cerebrovascular accident (CVA).  This patient has multiple major complaints, and was thought related to multiple sclerosis, however she was told by 2 different neurologists that the MRI changes did not fit with multiple sclerosis.  Instead it may be more of stroke.  This patient also has intermittent lower extremity edema although no previous evidence of venous thrombosis.   · May 2018 significantly elevated antiphosphatidylserine antibody IgM at 57.  We repeated laboratory study on 9/13/2018, she had persistently elevated anti-phosphatidylserine antibody IgM 65.  So she has antiphospholipid syndrome.    · She initiated Eliquis 5 mg twice daily mid June 2018.  This resulted in resolution of blurry vision and numbness of her hands  · Eliquis was then  dose reduced to 2.5 mg twice daily due to heavy menses and recurrent iron deficiency  · April 2020, she resumed 5 mg twice daily due to chest tightness and intermittent lower extremity swelling.  Patient did not have a confirmed progressive thrombosis at that time  · The patient feels quite adamantly presently that her progressive neurologic symptoms are related to antiphospholipid antibody syndrome and request referral to rheumatology.  This will be facilitated  · She will continue on Eliquis 5 mg twice daily  · Presently on exam, the patient has no symptoms of recurrent or progressive thrombosis       2.  Iron deficiency anemia, secondary to heavy menses.    · Intolerant to oral iron with significant constipation  · We treated her with 2 doses of intravenous iron with Injectofar in end of September and early October 2018.   · She had IV iron in late June and early July 2019 because recurrent iron deficiency.    · She reports in December and 18-day heavy menses.  She did discuss with GYN who recommended ablation, nonhormonal IUD or hysterectomy.  · Most recent labs 12/29/2020 with recurrent iron deficiency, ferritin of 42, iron saturation of 9%, hemoglobin 10.6  · Recurrent iron deficiency presently with ferritin 29, iron saturation 8%.  We will proceed with Injectafer x2  · I suggested the patient stay today due to her symptoms of weakness and fatigue though she would like to delay till next week.      3.  B12 deficiency.    · This patient has history of B12 deficiency and was given B12 injection previously.    · She continues on oral B12 1000 mcg daily with a B12 level within normal limits 9/29/2020, 916    4.  Progressive neurologic symptoms  · The patient reports a decline over the past 2 months with unexpected weight loss, lower extremity weakness, development of a stutter  · She is following up with neurology related to her multiple sclerosis, who does not feel her symptoms are related to her MS as her most  recent MRI was fairly stable  · Patient question symptomatology related to antiphospholipid syndrome or other autoimmune etiology, she is requesting referral to rheumatology at this time.  She reports her neurologist is also recommended referral to rheumatology    PLAN:    1. Continue Eliquis 5 mg twice daily  2. The patient will proceed with Injectafer x2 for iron deficiency anemia  3. Additional labs pending today including WANG and rheumatoid factor  4. Referral to rheumatology at the patient's request  5. The patient was encouraged to continue to follow-up with neurology  6. Return in 6 months for CBC, CMP, ferritin and iron profile    Total of 30 minutes were spent with the patient, in face-to-face counseling and education, I recommended the patient stay for Injectafer today as scheduled, though she would like to delay.  I reviewed today's lab values and discussed plan of care with the patient.  I have also discussed today's plan of care with Dr. Burgos who is in agreement    Anyi Friend, APRN   01/29/2021

## 2021-01-29 NOTE — NURSING NOTE
Seen this am per POOL [place] with iron studies done. Qualifies for injectafer. Catie obtained PA ; however patient requested to reschedule. This was ok'd per POOL [place]; to scheduling for Tuesday appt.

## 2021-01-31 LAB
CENTROMERE B AB SER-ACNC: <0.2 AI (ref 0–0.9)
CHROMATIN AB SERPL-ACNC: <0.2 AI (ref 0–0.9)
DSDNA AB SER-ACNC: 1 IU/ML (ref 0–9)
ENA JO1 AB SER-ACNC: <0.2 AI (ref 0–0.9)
ENA RNP AB SER-ACNC: 0.3 AI (ref 0–0.9)
ENA SCL70 AB SER-ACNC: 0.2 AI (ref 0–0.9)
ENA SM AB SER-ACNC: <0.2 AI (ref 0–0.9)
ENA SS-A AB SER-ACNC: <0.2 AI (ref 0–0.9)
ENA SS-B AB SER-ACNC: <0.2 AI (ref 0–0.9)
Lab: NORMAL

## 2021-02-01 ENCOUNTER — TELEPHONE (OUTPATIENT)
Dept: ONCOLOGY | Facility: CLINIC | Age: 41
End: 2021-02-01

## 2021-02-01 DIAGNOSIS — D50.0 IRON DEFICIENCY ANEMIA DUE TO CHRONIC BLOOD LOSS: ICD-10-CM

## 2021-02-01 DIAGNOSIS — D50.0 IRON DEFICIENCY ANEMIA DUE TO CHRONIC BLOOD LOSS: Primary | ICD-10-CM

## 2021-02-01 RX ORDER — IRON HEME POLYPEPTIDE/FOLIC AC 12-1MG
12 TABLET ORAL DAILY
Qty: 90 EACH | Refills: 3 | Status: SHIPPED | OUTPATIENT
Start: 2021-02-01 | End: 2021-02-01

## 2021-02-01 RX ORDER — IRON HEME POLYPEPTIDE/FOLIC AC 12-1MG
12 TABLET ORAL DAILY
Qty: 90 EACH | Refills: 3 | Status: CANCELLED | OUTPATIENT
Start: 2021-02-01

## 2021-02-01 RX ORDER — IRON HEME POLYPEPTIDE/FOLIC AC 12-1MG
12 TABLET ORAL DAILY
Qty: 90 EACH | Refills: 3 | Status: SHIPPED | OUTPATIENT
Start: 2021-02-01 | End: 2021-02-01 | Stop reason: RX

## 2021-02-01 RX ORDER — IRON PS COMPLEX/B12/FOLIC ACID 150-25-1
1 CAPSULE ORAL
Qty: 90 CAPSULE | Refills: 3 | Status: SHIPPED | OUTPATIENT
Start: 2021-02-01

## 2021-02-01 NOTE — TELEPHONE ENCOUNTER
"Returned call to pharmacy. Let pharmacy know that pt does not tolerate oral iron well. Asked if they knew of any other pharmacies that would carry it, pharmacist unsure.     Discussed with Dominga and Shelly. Per Dominga [email or web address] carries a 90 day supply, but it is $63.50 if pt is willing to pay for it.     Called pt to update her that [facility] does not carry this product and let her know [facility] does. Pt not willing to pay that much at this time. Pt stated she is willing to try something else as long as it has \"heme iron.\" Told pt I would discuss with Dr. Burgos's nurse to see if there was anything else he would recommend.   "

## 2021-02-01 NOTE — TELEPHONE ENCOUNTER
"Spoke with patient to let her know that we are unable to get these medications for her through any pharmacy. I found them online but the issue was getting insurance to possibly approve. [facility] pharmacy stated that they could fill \"ferrex forte\". Prescription sent in. Patient aware.   "

## 2021-02-01 NOTE — TELEPHONE ENCOUNTER
"Spoke with our pharmacy to update that patient does not want to pay th 63 dollars and what else they would suggest. The only other option would be \"heme plex fe\" but she was not sure where would fill that. I spoke with the pharmacist at [facility] and they do not carry this medication and have no way of getting it. I asked if they had anything that has \"heme iron\" in it per patients request. Pharmacist said no they only can offer her ferrous sulfate.   "

## 2021-02-01 NOTE — TELEPHONE ENCOUNTER
Sharyn says [facility] wasn';t able to order medication PROFERRIN-FORTE 12-1 MG but [facility] pharmacy does have it in stock, asking for Rx to be sent to [address] [address] [facility] AT [facility] & [facility] [phone] [facility] [phone]

## 2021-02-01 NOTE — TELEPHONE ENCOUNTER
ARTHUR WITH [facility] PH[facility] CALLING    THEY DO NOT HAVE THE PROFERRIN-FORTE 12-1 MG TABLET AVAILABLE TO THEM, WANTS TO SEE IF YOU WANT CHANGE THE SCRIPT TO TWO SEPARATE SCRIPTS ONE FOR  FOLIC ACID AND ONE FOR IRON, PLEASE ADVISE?    CALL BACK # [phone]

## 2021-02-02 ENCOUNTER — INFUSION (OUTPATIENT)
Dept: ONCOLOGY | Facility: HOSPITAL | Age: 41
End: 2021-02-02

## 2021-02-02 ENCOUNTER — DOCUMENTATION (OUTPATIENT)
Dept: ONCOLOGY | Facility: CLINIC | Age: 41
End: 2021-02-02

## 2021-02-02 VITALS
HEART RATE: 76 BPM | BODY MASS INDEX: 27.01 KG/M2 | RESPIRATION RATE: 18 BRPM | TEMPERATURE: 98.4 F | DIASTOLIC BLOOD PRESSURE: 64 MMHG | OXYGEN SATURATION: 100 % | SYSTOLIC BLOOD PRESSURE: 93 MMHG | HEIGHT: 64 IN | WEIGHT: 158.2 LBS

## 2021-02-02 DIAGNOSIS — T45.4X5A ADVERSE EFFECT OF IRON, INITIAL ENCOUNTER: Primary | ICD-10-CM

## 2021-02-02 DIAGNOSIS — D50.9 IRON DEFICIENCY ANEMIA, UNSPECIFIED IRON DEFICIENCY ANEMIA TYPE: ICD-10-CM

## 2021-02-02 PROCEDURE — 25010000002 FERRIC CARBOXYMALTOSE 750 MG/15ML SOLUTION 15 ML VIAL: Performed by: NURSE PRACTITIONER

## 2021-02-02 PROCEDURE — 96374 THER/PROPH/DIAG INJ IV PUSH: CPT

## 2021-02-02 PROCEDURE — 96365 THER/PROPH/DIAG IV INF INIT: CPT

## 2021-02-02 RX ORDER — SODIUM CHLORIDE 9 MG/ML
250 INJECTION, SOLUTION INTRAVENOUS ONCE
Status: COMPLETED | OUTPATIENT
Start: 2021-02-02 | End: 2021-02-02

## 2021-02-02 RX ORDER — PROCHLORPERAZINE MALEATE 5 MG/1
10 TABLET ORAL ONCE
Status: DISCONTINUED | OUTPATIENT
Start: 2021-02-02 | End: 2021-02-02 | Stop reason: HOSPADM

## 2021-02-02 RX ADMIN — FERRIC CARBOXYMALTOSE INJECTION 750 MG: 50 INJECTION, SOLUTION INTRAVENOUS at 09:20

## 2021-02-02 RX ADMIN — SODIUM CHLORIDE 250 ML: 9 INJECTION, SOLUTION INTRAVENOUS at 09:09

## 2021-02-02 NOTE — PROGRESS NOTES
Fax rec from [facility] stating [facility] has started a PA for pts Ferrex Forte.     I have completed the request and submitted to [employer]. The request has been approved until 2/2/2022.    [facility] notified

## 2021-02-09 ENCOUNTER — APPOINTMENT (OUTPATIENT)
Dept: ONCOLOGY | Facility: HOSPITAL | Age: 41
End: 2021-02-09

## 2021-02-12 ENCOUNTER — INFUSION (OUTPATIENT)
Dept: ONCOLOGY | Facility: HOSPITAL | Age: 41
End: 2021-02-12

## 2021-02-12 VITALS
SYSTOLIC BLOOD PRESSURE: 104 MMHG | BODY MASS INDEX: 26.98 KG/M2 | RESPIRATION RATE: 18 BRPM | DIASTOLIC BLOOD PRESSURE: 63 MMHG | OXYGEN SATURATION: 100 % | HEIGHT: 64 IN | HEART RATE: 88 BPM | TEMPERATURE: 97.3 F | WEIGHT: 158 LBS

## 2021-02-12 DIAGNOSIS — T45.4X5A ADVERSE EFFECT OF IRON, INITIAL ENCOUNTER: Primary | ICD-10-CM

## 2021-02-12 DIAGNOSIS — D50.9 IRON DEFICIENCY ANEMIA, UNSPECIFIED IRON DEFICIENCY ANEMIA TYPE: ICD-10-CM

## 2021-02-12 PROCEDURE — 96374 THER/PROPH/DIAG INJ IV PUSH: CPT

## 2021-02-12 PROCEDURE — 25010000002 FERRIC CARBOXYMALTOSE 750 MG/15ML SOLUTION 15 ML VIAL: Performed by: NURSE PRACTITIONER

## 2021-02-12 RX ORDER — MELATONIN
2000 DAILY
COMMUNITY
End: 2023-03-09

## 2021-02-12 RX ORDER — SODIUM CHLORIDE 9 MG/ML
250 INJECTION, SOLUTION INTRAVENOUS ONCE
Status: COMPLETED | OUTPATIENT
Start: 2021-02-12 | End: 2021-02-12

## 2021-02-12 RX ADMIN — SODIUM CHLORIDE 250 ML: 9 INJECTION, SOLUTION INTRAVENOUS at 09:10

## 2021-02-12 RX ADMIN — FERRIC CARBOXYMALTOSE INJECTION 750 MG: 50 INJECTION, SOLUTION INTRAVENOUS at 09:24

## 2021-02-22 ENCOUNTER — TELEPHONE (OUTPATIENT)
Dept: CARDIOLOGY | Facility: CLINIC | Age: 41
End: 2021-02-22

## 2021-02-22 NOTE — TELEPHONE ENCOUNTER
Dr. Riggins out of the office this week.  Please let patient know that her monitor study was relatively benign.  She did have occasional premature beat from the bottom chamber of the heart (premature ventricular contraction or PVC for short) that correlated with her symptoms of palpitations.  During recent office visit her blood pressure was felt to be a little low for AV brian blocker therapies.  Would have her try to make sure she is staying hydrated and would recommend avoiding caffeine, alcohol, dark chocolate, and recommend keeping upcoming appointment with Dr. Nicole to discuss further.  Would also see if she has symptoms of sleep apnea (morning fatigue, significant daytime fatigue, snoring) at which point could consider home sleep study as well if present.  Please let me know if she has additional questions or concerns.

## 2021-02-23 NOTE — TELEPHONE ENCOUNTER
Okay, I would continue to follow with her sleep medicine provider on the narcolepsy.    Would continue to follow with the managing provider for the iron deficiency as well.    Keep upcoming follow-up with the EP or call sooner for issues or concerns.

## 2021-02-23 NOTE — TELEPHONE ENCOUNTER
Patient notified of results and recommendations and verbalized understanding    Re sleep apnea- she doesn't snore or wake up fatigued.  She was recently diagnosed with narcolepsy, but she doesn't believe she has sleep apnea.    She also wanted me to let you know 10/7/20 her Iron Saturation was very low at 10.  She received 2 infusions and stated that her symptoms have overall imporved  Chana Pacheco RN  Triage nurse

## 2021-03-09 ENCOUNTER — OFFICE VISIT (OUTPATIENT)
Dept: CARDIOLOGY | Facility: CLINIC | Age: 41
End: 2021-03-09

## 2021-03-09 VITALS
HEIGHT: 64 IN | DIASTOLIC BLOOD PRESSURE: 80 MMHG | HEART RATE: 67 BPM | SYSTOLIC BLOOD PRESSURE: 116 MMHG | BODY MASS INDEX: 26.63 KG/M2 | WEIGHT: 156 LBS

## 2021-03-09 DIAGNOSIS — R00.2 PALPITATIONS: Primary | ICD-10-CM

## 2021-03-09 PROCEDURE — 93000 ELECTROCARDIOGRAM COMPLETE: CPT | Performed by: INTERNAL MEDICINE

## 2021-03-09 PROCEDURE — 99213 OFFICE O/P EST LOW 20 MIN: CPT | Performed by: INTERNAL MEDICINE

## 2021-03-10 NOTE — PROGRESS NOTES
Date of Office Visit: 2021  Encounter Provider: Ishan Nicole MD  Place of Service: [facility] CARDIOLOGY  Patient Name: Sharyn Jones  :1980    Chief Complaint   Patient presents with   • Palpitations   • Transient Ischemic Attack   • PFO   :     HPI: Sharyn Jones is a 41 y.o. female who presents today for history of POTS    The patient was previously followed by Dr. Jasmine for history of POTS.      She has a history of APS, and is currently on treatment with eliquis.      She has a history of TIAs.     She reports intermittent episodes of chest pain, triggered by food that is high in vit K    She is possibly going to start on treatment with topamax for migraines          Past Medical History:   Diagnosis Date   • Anemia     B12 anemia during pregnancy in    • Antiphospholipid syndrome (CMS/HCC)    • Autonomic dysfunction    • Blurry vision, bilateral    • Cervical cancer (CMS/HCC) 1965    Stage 0    • H/O HPV in female    • H/O Incomplete RBBB 2013   • H/O Raynaud phenomenon     Probable at age 31 during pregnancy   • History of prior pregnancies     x3   • History of syncope    • MS (multiple sclerosis) (CMS/HCC)    • Palpitation    • POTS (postural orthostatic tachycardia syndrome)    • Stroke (cerebrum) (CMS/HCC)    • Stroke (CMS/HCC)     Blood clot, age 26   • SVT (supraventricular tachycardia) (CMS/HCC)    • Tattoos    • TIA (transient ischemic attack) 2019       Past Surgical History:   Procedure Laterality Date   • CERVICAL BIOPSY  W/ LOOP ELECTRODE EXCISION         Social History     Socioeconomic History   • Marital status:      Spouse name: Bebo   • Number of children: 3   • Years of education: College   • Highest education level: Not on file   Tobacco Use   • Smoking status: Never Smoker   • Smokeless tobacco: Never Used   Vaping Use   • Vaping Use: Never used   Substance and Sexual Activity   • Alcohol use: No     Comment: No  "caffeine use   • Drug use: No   • Sexual activity: Yes     Partners: Male     Birth control/protection: None       Family History   Problem Relation Age of Onset   • Other Mother         Heart murmur   • Uterine cancer Mother 38   • ADD / ADHD Brother    • Stroke Other    • Alzheimer's disease Other    • Alzheimer's disease Maternal Aunt        Review of Systems   Constitutional: Positive for malaise/fatigue and weight loss.   Eyes: Negative.    Cardiovascular: Positive for chest pain.   Musculoskeletal: Positive for joint pain.   Neurological: Positive for difficulty with concentration and headaches.   Psychiatric/Behavioral: Negative.        Allergies   Allergen Reactions   • Epinephrine Other (See Comments)     Makes her pass out    • Codeine Rash     vomiting         Current Outpatient Medications:   •  cholecalciferol (VITAMIN D3) 25 MCG (1000 UT) tablet, Take 2,000 Units by mouth Daily., Disp: , Rfl:   •  Eliquis 5 MG tablet tablet, TAKE 1 TABLET BY MOUTH EVERY 12 HOURS, Disp: 180 tablet, Rfl: 0  •  polysacchar iron-FA-B12 (Ferrex 150 Forte) 150-1-25 MG-MG-MCG capsule capsule, Take 1 capsule by mouth Daily With Breakfast., Disp: 90 capsule, Rfl: 3  •  vitamin B-12 (CYANOCOBALAMIN) 100 MCG tablet, Take 50 mcg by mouth Daily., Disp: , Rfl:       Objective:     Vitals:    03/09/21 1116   BP: 116/80   Pulse: 67   Weight: 70.8 kg (156 lb)   Height: 162.6 cm (64\")     Body mass index is 26.78 kg/m².    PHYSICAL EXAM:    Vitals and nursing note reviewed.   Constitutional:       Appearance: Healthy appearance.   Cardiovascular:      Normal rate. Regular rhythm.      Murmurs: There is no murmur.   Skin:     General: Skin is warm.   Neurological:      Mental Status: Alert.   Psychiatric:         Attention and Perception: Attention and perception normal.             ECG 12 Lead    Date/Time: 3/9/2021 11:30 PM  Performed by: Ishan Nicole MD  Authorized by: Ishan Nicole MD   Comparison: compared with " previous ECG from 12/1/2020  Similar to previous ECG  Rhythm: sinus rhythm        I reviewed her labs from 1/29.  Normal CBC and CMP    I reviewed her Holter monitor tracings.  She marked 95 episodes.  They correlate with isolated PVCs.  They occurred at 1 % burden during monitoring.       Assessment:       Diagnosis Plan   1. Palpitations            Plan:       Her Holter monitor tracings are normal.  She currently doesn't meet criteria for POTS.  I don't see any arrhythmic explantation for her symptoms and have no further recommendations for evaluation or treatment.  She is start treatment for narcolepsy, per referring letter.  No barriers to this identified at this time.     As always, it has been a pleasure to participate in your patient's care.      Sincerely,         Ishan Nicole MD

## 2021-03-15 RX ORDER — APIXABAN 5 MG/1
TABLET, FILM COATED ORAL
Qty: 180 TABLET | Refills: 1 | Status: SHIPPED | OUTPATIENT
Start: 2021-03-15 | End: 2021-10-04

## 2021-04-06 ENCOUNTER — IMMUNIZATION (OUTPATIENT)
Dept: VACCINE CLINIC | Facility: HOSPITAL | Age: 41
End: 2021-04-06

## 2021-04-06 PROCEDURE — 0001A: CPT | Performed by: OBSTETRICS & GYNECOLOGY

## 2021-04-06 PROCEDURE — 91300 HC SARSCOV02 VAC 30MCG/0.3ML IM: CPT | Performed by: OBSTETRICS & GYNECOLOGY

## 2021-04-14 ENCOUNTER — TELEPHONE (OUTPATIENT)
Dept: ONCOLOGY | Facility: HOSPITAL | Age: 41
End: 2021-04-14

## 2021-04-14 ENCOUNTER — TELEPHONE (OUTPATIENT)
Dept: ONCOLOGY | Facility: CLINIC | Age: 41
End: 2021-04-14

## 2021-04-14 DIAGNOSIS — D50.0 IRON DEFICIENCY ANEMIA DUE TO CHRONIC BLOOD LOSS: Primary | ICD-10-CM

## 2021-04-14 NOTE — TELEPHONE ENCOUNTER
----- Message from Katelynn Owen RN sent at 4/14/2021  4:34 PM EDT -----  Please see if we can get pt in for lab appt (CBC, STAT iron profile and ferritin) and RN review next week.    Thanks.  Janette

## 2021-04-14 NOTE — TELEPHONE ENCOUNTER
Pt reports having more fatigue, restless legs and mouth sores as she did with anemia.  Pt will come in for labs and RN review.  Message sent to scheduling to contact pt with appt details.

## 2021-04-14 NOTE — TELEPHONE ENCOUNTER
Pt feels like she's getting anemic again.  Sores on tongue, tired.  She said she has been taking her iron pills.

## 2021-04-23 ENCOUNTER — LAB (OUTPATIENT)
Dept: OTHER | Facility: HOSPITAL | Age: 41
End: 2021-04-23

## 2021-04-23 ENCOUNTER — CLINICAL SUPPORT (OUTPATIENT)
Dept: ONCOLOGY | Facility: HOSPITAL | Age: 41
End: 2021-04-23

## 2021-04-23 VITALS
DIASTOLIC BLOOD PRESSURE: 69 MMHG | OXYGEN SATURATION: 100 % | HEART RATE: 97 BPM | TEMPERATURE: 97.3 F | BODY MASS INDEX: 25.95 KG/M2 | RESPIRATION RATE: 16 BRPM | HEIGHT: 64 IN | SYSTOLIC BLOOD PRESSURE: 102 MMHG | WEIGHT: 152 LBS

## 2021-04-23 DIAGNOSIS — D50.0 IRON DEFICIENCY ANEMIA DUE TO CHRONIC BLOOD LOSS: ICD-10-CM

## 2021-04-23 LAB
BASOPHILS # BLD AUTO: 0.04 10*3/MM3 (ref 0–0.2)
BASOPHILS NFR BLD AUTO: 0.5 % (ref 0–1.5)
DEPRECATED RDW RBC AUTO: 41.6 FL (ref 37–54)
EOSINOPHIL # BLD AUTO: 0.13 10*3/MM3 (ref 0–0.4)
EOSINOPHIL NFR BLD AUTO: 1.6 % (ref 0.3–6.2)
ERYTHROCYTE [DISTWIDTH] IN BLOOD BY AUTOMATED COUNT: 12.6 % (ref 12.3–15.4)
FERRITIN SERPL-MCNC: 400.7 NG/ML (ref 13–150)
HCT VFR BLD AUTO: 39.6 % (ref 34–46.6)
HGB BLD-MCNC: 13 G/DL (ref 12–15.9)
IMM GRANULOCYTES # BLD AUTO: 0.03 10*3/MM3 (ref 0–0.05)
IMM GRANULOCYTES NFR BLD AUTO: 0.4 % (ref 0–0.5)
IRON 24H UR-MRATE: 59 MCG/DL (ref 37–145)
IRON SATN MFR SERPL: 24 % (ref 20–50)
LYMPHOCYTES # BLD AUTO: 2.18 10*3/MM3 (ref 0.7–3.1)
LYMPHOCYTES NFR BLD AUTO: 27.5 % (ref 19.6–45.3)
MCH RBC QN AUTO: 29.4 PG (ref 26.6–33)
MCHC RBC AUTO-ENTMCNC: 32.8 G/DL (ref 31.5–35.7)
MCV RBC AUTO: 89.6 FL (ref 79–97)
MONOCYTES # BLD AUTO: 0.46 10*3/MM3 (ref 0.1–0.9)
MONOCYTES NFR BLD AUTO: 5.8 % (ref 5–12)
NEUTROPHILS NFR BLD AUTO: 5.08 10*3/MM3 (ref 1.7–7)
NEUTROPHILS NFR BLD AUTO: 64.2 % (ref 42.7–76)
NRBC BLD AUTO-RTO: 0 /100 WBC (ref 0–0.2)
PLATELET # BLD AUTO: 284 10*3/MM3 (ref 140–450)
PMV BLD AUTO: 9.3 FL (ref 6–12)
RBC # BLD AUTO: 4.42 10*6/MM3 (ref 3.77–5.28)
TIBC SERPL-MCNC: 247 MCG/DL (ref 298–536)
TRANSFERRIN SERPL-MCNC: 166 MG/DL (ref 200–360)
WBC # BLD AUTO: 7.92 10*3/MM3 (ref 3.4–10.8)

## 2021-04-23 PROCEDURE — 36415 COLL VENOUS BLD VENIPUNCTURE: CPT

## 2021-04-23 PROCEDURE — 82728 ASSAY OF FERRITIN: CPT | Performed by: INTERNAL MEDICINE

## 2021-04-23 PROCEDURE — 83540 ASSAY OF IRON: CPT | Performed by: INTERNAL MEDICINE

## 2021-04-23 PROCEDURE — 85025 COMPLETE CBC W/AUTO DIFF WBC: CPT | Performed by: INTERNAL MEDICINE

## 2021-04-23 PROCEDURE — 84466 ASSAY OF TRANSFERRIN: CPT | Performed by: INTERNAL MEDICINE

## 2021-04-23 PROCEDURE — G0463 HOSPITAL OUTPT CLINIC VISIT: HCPCS

## 2021-04-23 NOTE — NURSING NOTE
Discussed labs with Judi Goldsmith RN since Dr. Burgos's RN out of office. Patient does not qualify for iron repletion at this time. Patient was advised if any chest pains or shortness of air to go to ER and patient v/u.Copy of labs and appointments given to patient. Patient v/u if any concerns before next appointment she will contact physician.  Lab Results   Component Value Date    WBC 7.92 04/23/2021    HGB 13.0 04/23/2021    HCT 39.6 04/23/2021    MCV 89.6 04/23/2021     04/23/2021     Lab Results   Component Value Date    NEUTROABS 5.08 04/23/2021     Lab Results   Component Value Date    IRON 59 04/23/2021    TIBC 247 (L) 04/23/2021    FERRITIN 400.70 (H) 04/23/2021

## 2021-04-27 ENCOUNTER — APPOINTMENT (OUTPATIENT)
Dept: VACCINE CLINIC | Facility: HOSPITAL | Age: 41
End: 2021-04-27

## 2021-05-04 ENCOUNTER — APPOINTMENT (OUTPATIENT)
Dept: VACCINE CLINIC | Facility: HOSPITAL | Age: 41
End: 2021-05-04

## 2021-05-17 ENCOUNTER — TELEPHONE (OUTPATIENT)
Dept: ONCOLOGY | Facility: CLINIC | Age: 41
End: 2021-05-17

## 2021-05-17 NOTE — TELEPHONE ENCOUNTER
Returning call to pt. Pt stated that she went to the ER earlier and they did a full work up. Pt stated they ruled out PE. Pt stated they gave her fluids and it helped a lot. Pt stated her HR was high and BP was low. Pt stated they told her to contact our office to f/u with Dr. Burgos to see if there are more tests that he would want to run. Asked pt if they told her anything specific as to what they thought was wrong and pt was unsure. Told pt I would send message to clinic RN to review with Dr. Burgos when he is back in the office tomorrow and see how he wants to proceed. Pt v/u.

## 2021-05-17 NOTE — TELEPHONE ENCOUNTER
Attempted to contact patient to follow up on her ER visit for SOA and chest pain. I reviewed all documents from [facility] in her chart as of now and wanted to get more details considering all imaging and lab work were normal. Patient stated she was told to follow up in our office. ER notes advise to follow up with primary for blood pressures. Will attempt to contact tomorrow 5/18.

## 2021-05-17 NOTE — TELEPHONE ENCOUNTER
Returning call to pt. Pt stated she has been having new SOB and migraines. Pt stated she feels like her DVT is back in her left leg as well. Pt denies symptoms of swelling, redness, or tenderness, but states she has a throbbing sensation right behind her knee. Pt stated she takes eliquis twice daily and has not missed any doses. Pt stated she went to the urgent care a couple of days ago and her BP was 90/49. Pt stated they recommended she go to the ER and call our office. Pt stated she does not want to go to the ER that our office are the only ones that know what's going on. Told pt with the SOB and her feeling as though the DVT is back in her left leg that she needs to go to the ER to make sure she does not have a PE. Told pt I would discuss with NP. Pt v/u.       Reviewed with Nyasia PARRA who agreed pt should go to ER to rule of PE.    Attempted to call pt back x2 to let her know to proceed to the ER. No answer, lvm.     Pt returning call. Let pt know she should go to ER to rule out PE. Pt v/u.

## 2021-05-18 ENCOUNTER — TELEPHONE (OUTPATIENT)
Dept: ONCOLOGY | Facility: CLINIC | Age: 41
End: 2021-05-18

## 2021-05-18 DIAGNOSIS — D68.59 HYPERCOAGULATION SYNDROME (HCC): ICD-10-CM

## 2021-05-18 DIAGNOSIS — D68.61 ANTIPHOSPHOLIPID ANTIBODY SYNDROME (HCC): ICD-10-CM

## 2021-05-18 DIAGNOSIS — D50.0 IRON DEFICIENCY ANEMIA DUE TO CHRONIC BLOOD LOSS: Primary | ICD-10-CM

## 2021-05-18 NOTE — TELEPHONE ENCOUNTER
"Patient called me this am. I let her know I reviewed her labs and scans with Dr. Otoole who sees nothing to add at this time considering all of it was stable. Patient stated it will be a couple weeks before she can get in with her pcp and is worried that something is wrong. She stated that her toes turn blue and she's SOA and \"worn out\". Her oxygen sat was 100% at the ER yesterday 5/17. She mentioned that the ER doctor told her that her vit d level could be causing it and she wants to come get that looked at and see dr otoole because she feels \"something isn't right\". Message sent to gregory car  to move her apt up. Patient v/u. Waiting for call.   "

## 2021-05-18 NOTE — TELEPHONE ENCOUNTER
----- Message from Theresa Connell RN sent at 5/18/2021  9:37 AM EDT -----  Catie,     Can we move her apt up with Dr. Burgos, shell need labs as well. I will add which ones once I have a second to figure out what we would need.    Thanks!     ----- Message -----  From: Anabella Ferreira RN  Sent: 5/17/2021   4:49 PM EDT  To: Theresa Connell RN    Spoke to pt earlier today, note in regarding sx of SOB and thought DVT was back.       Pt stated that she went to the ER earlier and they did a full work up. Pt stated they ruled out PE. Pt stated they gave her fluids and it helped a lot. Pt stated her HR was high and BP was low. Pt stated they told her to contact our office to f/u with Dr. Burgos to see if there are more tests that he would want to run. Asked pt if they told her anything specific as to what they thought was wrong and pt was unsure.    She went to [place] and I was looking through her care everywhere notes and everything looked pretty normal. Her BP and HR looked fine. But she wants to f/u with Dr. Burgos, but I'm really not sure what he would do?

## 2021-05-26 DIAGNOSIS — D68.59 HYPERCOAGULATION SYNDROME (HCC): ICD-10-CM

## 2021-05-26 DIAGNOSIS — D50.0 IRON DEFICIENCY ANEMIA DUE TO CHRONIC BLOOD LOSS: ICD-10-CM

## 2021-05-26 DIAGNOSIS — D50.9 IRON DEFICIENCY ANEMIA, UNSPECIFIED IRON DEFICIENCY ANEMIA TYPE: Primary | ICD-10-CM

## 2021-05-26 DIAGNOSIS — E53.8 B12 DEFICIENCY: ICD-10-CM

## 2021-06-03 ENCOUNTER — HOSPITAL ENCOUNTER (OUTPATIENT)
Dept: CARDIOLOGY | Facility: HOSPITAL | Age: 41
Discharge: HOME | End: 2021-06-03
Admitting: INTERNAL MEDICINE

## 2021-06-03 ENCOUNTER — LAB (OUTPATIENT)
Dept: OTHER | Facility: HOSPITAL | Age: 41
End: 2021-06-03

## 2021-06-03 ENCOUNTER — OFFICE VISIT (OUTPATIENT)
Dept: ONCOLOGY | Facility: CLINIC | Age: 41
End: 2021-06-03

## 2021-06-03 VITALS
OXYGEN SATURATION: 98 % | SYSTOLIC BLOOD PRESSURE: 114 MMHG | TEMPERATURE: 97.3 F | BODY MASS INDEX: 24.31 KG/M2 | HEIGHT: 64 IN | DIASTOLIC BLOOD PRESSURE: 74 MMHG | WEIGHT: 142.4 LBS | HEART RATE: 90 BPM | RESPIRATION RATE: 18 BRPM

## 2021-06-03 DIAGNOSIS — D50.9 IRON DEFICIENCY ANEMIA, UNSPECIFIED IRON DEFICIENCY ANEMIA TYPE: ICD-10-CM

## 2021-06-03 DIAGNOSIS — R23.0 CYANOSIS OF TIP OF FINGER: Primary | ICD-10-CM

## 2021-06-03 DIAGNOSIS — D68.59 HYPERCOAGULATION SYNDROME (HCC): ICD-10-CM

## 2021-06-03 DIAGNOSIS — Z79.01 ANTICOAGULATION ADEQUATE: ICD-10-CM

## 2021-06-03 DIAGNOSIS — R60.0 LEG EDEMA, LEFT: ICD-10-CM

## 2021-06-03 DIAGNOSIS — E53.8 B12 DEFICIENCY: ICD-10-CM

## 2021-06-03 DIAGNOSIS — R23.0 CYANOSIS OF TIP OF FINGER: ICD-10-CM

## 2021-06-03 DIAGNOSIS — R53.82 CHRONIC FATIGUE: ICD-10-CM

## 2021-06-03 DIAGNOSIS — D50.0 IRON DEFICIENCY ANEMIA DUE TO CHRONIC BLOOD LOSS: ICD-10-CM

## 2021-06-03 DIAGNOSIS — G45.9 TIA (TRANSIENT ISCHEMIC ATTACK): ICD-10-CM

## 2021-06-03 LAB
25(OH)D3 SERPL-MCNC: 27.1 NG/ML (ref 30–100)
ALBUMIN SERPL-MCNC: 4.3 G/DL (ref 3.5–5.2)
ALBUMIN/GLOB SERPL: 1.2 G/DL
ALP SERPL-CCNC: 84 U/L (ref 39–117)
ALT SERPL W P-5'-P-CCNC: 8 U/L (ref 1–33)
ANION GAP SERPL CALCULATED.3IONS-SCNC: 10.5 MMOL/L (ref 5–15)
AST SERPL-CCNC: 12 U/L (ref 1–32)
BASOPHILS # BLD AUTO: 0.04 10*3/MM3 (ref 0–0.2)
BASOPHILS NFR BLD AUTO: 0.6 % (ref 0–1.5)
BH CV LOWER VASCULAR LEFT COMMON FEMORAL AUGMENT: NORMAL
BH CV LOWER VASCULAR LEFT COMMON FEMORAL COMPETENT: NORMAL
BH CV LOWER VASCULAR LEFT COMMON FEMORAL COMPRESS: NORMAL
BH CV LOWER VASCULAR LEFT COMMON FEMORAL PHASIC: NORMAL
BH CV LOWER VASCULAR LEFT COMMON FEMORAL SPONT: NORMAL
BH CV LOWER VASCULAR LEFT DISTAL FEMORAL COMPRESS: NORMAL
BH CV LOWER VASCULAR LEFT GASTRONEMIUS COMPRESS: NORMAL
BH CV LOWER VASCULAR LEFT GREATER SAPH AK COMPRESS: NORMAL
BH CV LOWER VASCULAR LEFT GREATER SAPH BK COMPRESS: NORMAL
BH CV LOWER VASCULAR LEFT LESSER SAPH COMPRESS: NORMAL
BH CV LOWER VASCULAR LEFT MID FEMORAL AUGMENT: NORMAL
BH CV LOWER VASCULAR LEFT MID FEMORAL COMPETENT: NORMAL
BH CV LOWER VASCULAR LEFT MID FEMORAL COMPRESS: NORMAL
BH CV LOWER VASCULAR LEFT MID FEMORAL PHASIC: NORMAL
BH CV LOWER VASCULAR LEFT MID FEMORAL SPONT: NORMAL
BH CV LOWER VASCULAR LEFT PERONEAL COMPRESS: NORMAL
BH CV LOWER VASCULAR LEFT POPLITEAL AUGMENT: NORMAL
BH CV LOWER VASCULAR LEFT POPLITEAL COMPETENT: NORMAL
BH CV LOWER VASCULAR LEFT POPLITEAL COMPRESS: NORMAL
BH CV LOWER VASCULAR LEFT POPLITEAL PHASIC: NORMAL
BH CV LOWER VASCULAR LEFT POPLITEAL SPONT: NORMAL
BH CV LOWER VASCULAR LEFT POSTERIOR TIBIAL COMPRESS: NORMAL
BH CV LOWER VASCULAR LEFT PROFUNDA FEMORAL COMPRESS: NORMAL
BH CV LOWER VASCULAR LEFT PROXIMAL FEMORAL COMPRESS: NORMAL
BH CV LOWER VASCULAR LEFT SAPHENOFEMORAL JUNCTION COMPRESS: NORMAL
BH CV LOWER VASCULAR LEFT SOLEAL COMPRESS: NORMAL
BH CV LOWER VASCULAR RIGHT COMMON FEMORAL AUGMENT: NORMAL
BH CV LOWER VASCULAR RIGHT COMMON FEMORAL COMPETENT: NORMAL
BH CV LOWER VASCULAR RIGHT COMMON FEMORAL COMPRESS: NORMAL
BH CV LOWER VASCULAR RIGHT COMMON FEMORAL PHASIC: NORMAL
BH CV LOWER VASCULAR RIGHT COMMON FEMORAL SPONT: NORMAL
BILIRUB SERPL-MCNC: 0.9 MG/DL (ref 0–1.2)
BUN SERPL-MCNC: 5 MG/DL (ref 6–20)
BUN/CREAT SERPL: 7.4 (ref 7–25)
CALCIUM SPEC-SCNC: 9.6 MG/DL (ref 8.6–10.5)
CHLORIDE SERPL-SCNC: 102 MMOL/L (ref 98–107)
CO2 SERPL-SCNC: 27.5 MMOL/L (ref 22–29)
CREAT SERPL-MCNC: 0.68 MG/DL (ref 0.57–1)
DEPRECATED RDW RBC AUTO: 39.2 FL (ref 37–54)
EOSINOPHIL # BLD AUTO: 0.21 10*3/MM3 (ref 0–0.4)
EOSINOPHIL NFR BLD AUTO: 3 % (ref 0.3–6.2)
ERYTHROCYTE [DISTWIDTH] IN BLOOD BY AUTOMATED COUNT: 11.9 % (ref 12.3–15.4)
FERRITIN SERPL-MCNC: 375.8 NG/ML (ref 13–150)
GFR SERPL CREATININE-BSD FRML MDRD: 96 ML/MIN/1.73
GLOBULIN UR ELPH-MCNC: 3.6 GM/DL
GLUCOSE SERPL-MCNC: 101 MG/DL (ref 65–99)
HCT VFR BLD AUTO: 39.9 % (ref 34–46.6)
HGB BLD-MCNC: 13 G/DL (ref 12–15.9)
IMM GRANULOCYTES # BLD AUTO: 0.02 10*3/MM3 (ref 0–0.05)
IMM GRANULOCYTES NFR BLD AUTO: 0.3 % (ref 0–0.5)
IRON 24H UR-MRATE: 82 MCG/DL (ref 37–145)
IRON SATN MFR SERPL: 31 % (ref 20–50)
LYMPHOCYTES # BLD AUTO: 2.03 10*3/MM3 (ref 0.7–3.1)
LYMPHOCYTES NFR BLD AUTO: 28.6 % (ref 19.6–45.3)
MCH RBC QN AUTO: 29.3 PG (ref 26.6–33)
MCHC RBC AUTO-ENTMCNC: 32.6 G/DL (ref 31.5–35.7)
MCV RBC AUTO: 89.9 FL (ref 79–97)
MONOCYTES # BLD AUTO: 0.35 10*3/MM3 (ref 0.1–0.9)
MONOCYTES NFR BLD AUTO: 4.9 % (ref 5–12)
NEUTROPHILS NFR BLD AUTO: 4.45 10*3/MM3 (ref 1.7–7)
NEUTROPHILS NFR BLD AUTO: 62.6 % (ref 42.7–76)
NRBC BLD AUTO-RTO: 0 /100 WBC (ref 0–0.2)
PLATELET # BLD AUTO: 279 10*3/MM3 (ref 140–450)
PMV BLD AUTO: 9.7 FL (ref 6–12)
POTASSIUM SERPL-SCNC: 4.1 MMOL/L (ref 3.5–5.2)
PROT SERPL-MCNC: 7.9 G/DL (ref 6–8.5)
RBC # BLD AUTO: 4.44 10*6/MM3 (ref 3.77–5.28)
SODIUM SERPL-SCNC: 140 MMOL/L (ref 136–145)
T3FREE SERPL-MCNC: 2.98 PG/ML (ref 2–4.4)
T4 FREE SERPL-MCNC: 1.02 NG/DL (ref 0.93–1.7)
TIBC SERPL-MCNC: 262 MCG/DL (ref 298–536)
TRANSFERRIN SERPL-MCNC: 176 MG/DL (ref 200–360)
TSH SERPL DL<=0.05 MIU/L-ACNC: 2.46 UIU/ML (ref 0.27–4.2)
WBC # BLD AUTO: 7.1 10*3/MM3 (ref 3.4–10.8)

## 2021-06-03 PROCEDURE — 84439 ASSAY OF FREE THYROXINE: CPT | Performed by: INTERNAL MEDICINE

## 2021-06-03 PROCEDURE — 36415 COLL VENOUS BLD VENIPUNCTURE: CPT

## 2021-06-03 PROCEDURE — 93971 EXTREMITY STUDY: CPT

## 2021-06-03 PROCEDURE — 86157 COLD AGGLUTININ TITER: CPT | Performed by: INTERNAL MEDICINE

## 2021-06-03 PROCEDURE — 84481 FREE ASSAY (FT-3): CPT | Performed by: INTERNAL MEDICINE

## 2021-06-03 PROCEDURE — 82595 ASSAY OF CRYOGLOBULIN: CPT | Performed by: INTERNAL MEDICINE

## 2021-06-03 PROCEDURE — 84466 ASSAY OF TRANSFERRIN: CPT | Performed by: INTERNAL MEDICINE

## 2021-06-03 PROCEDURE — 82728 ASSAY OF FERRITIN: CPT | Performed by: INTERNAL MEDICINE

## 2021-06-03 PROCEDURE — 99215 OFFICE O/P EST HI 40 MIN: CPT | Performed by: INTERNAL MEDICINE

## 2021-06-03 PROCEDURE — 82306 VITAMIN D 25 HYDROXY: CPT | Performed by: INTERNAL MEDICINE

## 2021-06-03 PROCEDURE — 80050 GENERAL HEALTH PANEL: CPT | Performed by: INTERNAL MEDICINE

## 2021-06-03 PROCEDURE — 83540 ASSAY OF IRON: CPT | Performed by: INTERNAL MEDICINE

## 2021-06-03 NOTE — PROGRESS NOTES
.   REASON FOR FOLLOW UP:     1.  Anemia and possible blood clots with suspected episodes of strokes.   2.  Laboratory study on 5/31/2018 reported significantly elevated anti-phosphatidylserine IgM at 57, and marginally elevated anticardiolipin IgM 14.  All others were negative.   3.  Laboratory tests confirmed vitamin B12 deficiency and iron deficiency on 5/31/2018.    4.  Repeated laboratory study on 9/13/2018 showed persistently significantly elevated anti-phosphatidylserine IgM at 65.  She also has persistent significant iron deficiency and vitamin B12 deficiency.   5.  Patient was not able to tolerate oral iron due to significant constipation, she was given 2 doses Injectafer in end of September 2018, with significant improved iron panel and normalization of hemoglobin in December 2018.   6.  Patient had recurrent iron deficiency in June 2019 due to heavy menses.  Patient was given Injectafer weekly for 2 doses.  Her vitamin B12 level improved at 404 on 6/24/2019.    7.  Patient now taking vitamin B12 supplement with great tolerance.     HISTORY OF PRESENT ILLNESS:  The patient is a 41 y.o. year old female with the above-mentioned history, she returns the office today for 3 months reassessment.    Patient has a plethora of complaints today.  She reports significant fatigue, more so recently, she has no energy at all, she lies in bed or sitting couch most of the time.  She reports exertional dyspnea, and the worn out.  She also reports cyanosis involving fingers and toes very often, she also reports cramping in her legs often time.  She also reports dizziness, and was seen by her neurologist.  Patient reports that she feels something is wrong with her.    She apparently was in the ER on 5/17/2021 at [facility] with oxygen 100% on room air.  She had a unremarkable CBC and CMP.  She had a chest angiogram study which reported no evidence for pulmonary emboli or other acute process.            HEMATOLOGIC/ONCOLOGIC HISTORY:  The patient is a 38 y.o. year old female who is here for initial evaluation with the above-mentioned history on 5/31/2018.    Patient presented for evaluation reported by her primary care physician Dr. Bravo because of recurrent episodes of blurry visions.  Patient also has abnormal brain MRI examination on multiple occasions.  She was origin of diagnosis of multiple sclerosis, however was told by urologist Dr. Khanna that she did not have multiple sclerosis.  Most recently patient was seen by a different neurologist Dr. Scott that her MRI images did not fit with typical MS changes, instead it may be changes related to thrombotic stroke.  This patient has chronic migraine headache, and was taking Topamax low-dose with good results but with side effects and the medicine was recently changed to Trokendl XR.     This patient also has history of iron deficiency for which she has been taking oral iron for many years.  She also had vitamin B12 deficiency.  Her laboratory study on 11/20/2017 clearly reported iron deficiency with ferritin 8 ng/mL, and she was already mildly anemic with hemoglobin 11.3, and MCV 82.9. She had a normal WBC 6700 including neutrophils 3850 and lymphocytes 2190, monocytes 420.  Her platelets was 323,000.     Laboratory study on 3/23/2018 reported hemoglobin 11.6, MCV 83.1 MCHC 33.6.  Her WBC was 7900 including neutrophils 5240, lymphocytes 1980, monocytes 500.  Her platelets were 341,000.  Her iron studies reported free iron 106 TIBC 365 and iron saturation 29%, vitamin B12 at 242 pg/mL and vitamin D 20 ng/mL.  There was no ferritin level nor folic acid level.      Laboratory studies we obtained on 05/31/2018 showed significantly elevated antiphosphatidylserine antibody IgM subtype at 57, but negative for IgG and IgA subtype.  She is also marginally positive for anticardiolipin IgM at 14, but negative for IgG subtype.  She was tested negative for other  hypercoagulable tests including anti-Beta-2 glycoprotein 1 antibodies, negative for lupus anticoagulant and normal antithrombin 103%, normal protein C activity 117%, normal protein S activity 81% with free protein S antigen 90%.  She is also negative for factor II mutation and factor V Leiden mutation.  We also checked her for comprehensive rheumatoid panel which is all negative as well as negative for the rheumatoid factor and direct WANG.  She also had iron deficiency, ferritin 12.5, iron saturation 12% 05/31/2018.     The patient presented on 6/14/2018 for reevaluation and to discuss laboratory results obtained on 05/31/2018 when I saw her for the 1st time for evaluation of possible thrombosis as well as persistent anemia.  The patient reports no changes in her clinical condition the past 6 days.  No recent episodes of blurry vision.  She is on low-dose aspirin 81 mg daily.  The patient also has been on low-dose oral iron 28 mg daily with good compliance.  She was not able to tolerate the large dose of ferrous sulfate at 65 mg tablets because of significant constipation.  The patient is not really compliant with oral vitamin B12 supplementation.    We started patient on Eliquis 5 mg twice a day on 6/14/2018.  This patient is likely having antiphospholipid antibody syndrome.    In September 2018, patient reports she has no recurrence of blurry vision, no headaches and dizziness since she started on Eliquis 5 mg twice a day in middle June 2018.  She feels more fit.  Her  also reports patient is more energetic in the past 3 months.  Patient reports no easy bleeding or bruising.  She does note somewhat increased volume of her menses.    Patient reports she was not able to tolerate even low-dose oral iron with significant constipation.  She also reports vitamin B12 causes palpitation.  So currently she is not taking oral iron or vitamin B-12.      Laboratory results on 9/13/2018 reported persistently elevated  anti-phosphatidylserine IgM at 65, however resolution of mildly elevated anticardiolipin IgM antibody.  She has persistent iron deficiency with ferritin 8 ng/mL, iron saturation 7%, both of which actually worse than those levels on 5/31/2018.  Her B12 level also is low at 236 pg/mL.  She has worsening anemia with hemoglobin 11.2 but maintains normal WBC 6500 and platelets 292,000.     Patient had IV Injectafer at the end of September and early October.  She reports improved energy level.      Laboratory studies on 12/13/2018 reported normalized hemoglobin at 12.8, MCV 88.0, MCHC 33.4.  She maintains normal platelets at 262,000 and WBC 7670.  Her iron study showed significantly improved ferritin 206 ng/mL and iron saturation 18%.  Her vitamin B12 level 264 pg/mL.      On 6/24/2019, the patient complained of recent palpitations, and was seen by a cardiologist, who prescribed her metoprolol 25 mg, however she has not started it yet.      Her laboratory study on 6/24/2019 reported normal hemoglobin 12.4, MCV 87.7, normal platelets 296,000 and WBC 6090.  It also reported recurrent iron deficiency with a ferritin 36.8, and iron saturation 10% free iron 34 TIBC 326.  Slightly improved vitamin B12 level at 404 pg/mL.  she has recurrent iron deficiency, most likely due to her heavy menses.  Patient was not able to tolerate oral iron.     Patient was given Injectafer 2 doses in July 2019.     Patient was given 1 dose of Injectafer in October 2020 due to recurrent iron deficiency.    In December 2020, she reports she had a heavy menses which lasted approximately 18 days.  She did hold her anticoagulation with no improvement in her menses.  She questions her need for iron.  She has attempted to take oral iron though is intolerant with abdominal discomfort.    She reports she has developed progressive neurologic symptoms with a stutter and generalized weakness.  She does regularly follow-up with neurology.  The patient is quite  "adamant that her symptoms are related to her antiphospholipid syndrome.  She is requesting referral to rheumatology today.    She does continue on Eliquis 5 mg twice daily with good tolerance.  She reports she is still \"clotting\" with intermittent discomfort in her upper and lower extremities which she attributes to blood clots.  Presently, she has no extremity pain, swelling or erythema.  She does continue on B12 supplementation.             Vitals:    06/03/21 0843   BP: 114/74   Pulse: 90   Resp: 18   Temp: 97.3 °F (36.3 °C)   TempSrc: Temporal   SpO2: 98%   Weight: 64.6 kg (142 lb 6.4 oz)   Height: 162.6 cm (64.02\")   PainSc:   6   PainLoc: Generalized     Current Status 6/3/2021   ECOG score 0     PHYSICAL EXAM:    GENERAL:  Well-developed, well-nourished in no acute distress.   SKIN:  Warm, dry without rashes, purpura or petechiae.  HEAD:  Normocephalic.  EYES:  Pupils equal, round.  EOMs intact.  Conjunctivae normal.  CHEST: Breathing unlabored, no acute distress  EXTREMITIES:  No clubbing, cyanosis or edema.  No signs of thrombosis in bilateral upper or lower extremity  NEUROLOGICAL: No focal neurological deficits.  The patient speech is appropriate during our visit today  PSYCHIATRIC:  Normal affect and mood.        RECENT LABS:  Results from last 7 days   Lab Units 06/03/21  0844   WBC 10*3/mm3 7.10   NEUTROS ABS 10*3/mm3 4.45   HEMOGLOBIN g/dL 13.0   HEMATOCRIT % 39.9   PLATELETS 10*3/mm3 279             IMAGING STUDY:  Chest angiogram study  DATE: 5/17/2021 2:10 PM     PROVIDED INDICATION: 41 years old Female with history of  Patient on factor V 1 deficiency chest pain shortness of breath.     COMPARISON: None.     TECHNIQUE: A CT PE protocol examination of the chest with attention to the pulmonary arteries was performed from the diaphragms to the lung apices following the intravenous administration of contrast material. Images were obtained in the axial plane,   with coronal and sagittal reconstructed " images were also reviewed. 3-D reconstructions of the thoracic vasculature also obtained at a separate workstation.   Radiation dose reduction techniques were used for the scan per the ALARA (As Low As Reasonably Achievable) protocol.     FINDINGS:     This is a high quality study for the evaluation of the pulmonary arteries.   The pulmonary arteries are normal in appearance without filling defects to suggest presence of pulmonary emboli.     LUNGS AND AIRWAYS: The lungs are clear and are without pneumothoraces, focal infiltrates, or pleural effusions.   HEART AND MEDIASTINUM: The heart is normal in size. The mediastinum appears to be within normal limits.  There is no evidence of mediastinal, hilar or axillary adenopathy. The aorta is within normal limits and is without atherosclerotic disease.. Imaged   thyroid gland is unremarkable.   CHEST WALL: The soft tissues of the chest wall are within normal limits.   BONES: No acute or aggressive osseous abnormality.   IMAGED UPPER ABDOMEN: No acute or suspicious abnormality.     IMPRESSION:   1. No pulmonary artery filling defects to suggest pulmonary emboli.   2. No acute thoracic process.     Dictated by: Gomez Sanford M.D.         Assessment/Plan      1.  Antiphospholipid antibody syndrome, with significantly anti-phosphatidylserine IgM antibody twice more than 3 months apart.    · This patient had symptoms with blurry vision, bilateral, ? Cerebrovascular accident (CVA).  This patient has multiple major complaints, and was thought related to multiple sclerosis, however she was told by 2 different neurologists that the MRI changes did not fit with multiple sclerosis.  Instead it may be more of stroke.  This patient also has intermittent lower extremity edema although no previous evidence of venous thrombosis.   · May 2018 significantly elevated antiphosphatidylserine antibody IgM at 57.  We repeated laboratory study on 9/13/2018, she had persistently elevated  anti-phosphatidylserine antibody IgM 65.  So she has antiphospholipid syndrome.    · She initiated Eliquis 5 mg twice daily mid June 2018.  This resulted in resolution of blurry vision and numbness of her hands  · Eliquis was then dose reduced to 2.5 mg twice daily due to heavy menses and recurrent iron deficiency  · April 2020, she resumed 5 mg twice daily due to chest tightness and intermittent lower extremity swelling.  Patient did not have confirmed progressive thrombosis at that time  · The patient feels quite adamantly presently that her progressive neurologic symptoms are related to antiphospholipid antibody syndrome and request referral to rheumatology.  This was facilitated in March 2021.  · She will continue on Eliquis 5 mg twice daily  · On 6/3/2021, patient reports she has left leg edema last week.  She feels slightly better today.  Discussed with patient, I recommended to obtain venous Doppler study for assessment concerning for recurrent DVT.       2.  Iron deficiency anemia, secondary to heavy menses.    · Intolerant to oral iron with significant constipation  · We treated her with 2 doses of intravenous iron with Injectofar in end of September and early October 2018.   · She had IV iron in late June and early July 2019 because recurrent iron deficiency.    · She reports heavy menses.  She did discuss with GYN who recommended ablation, nonhormonal IUD or hysterectomy.  · Recurrent iron deficiency anemia with Hb 11.5, ferritin 16.9 and iron saturation 10% on 10/7/2020.  Patient was given Injectafer 1 dose in October 2020.  · Patient reports in December 2020 she had a heavy menses, one-time it lasted for 18 days despite holding anticoagulation.  Labs 12/29/2020 with recurrent iron deficiency, ferritin of 42, iron saturation of 9%, hemoglobin 10.6.  No IV iron infusion.  · Persistent worsening iron deficiency with ferritin 29, iron saturation 8% on 1/29/2021.  Patient had Injectafer 2 doses in February  2021  · Laboratory studies on 4/23/2021 reported much improved iron study with ferritin 400.7 ng/mL, iron saturation 24%, and improved hemoglobin 13.0.  · Today 6/3/2021, patient maintains normal hemoglobin 13.0.  Iron study reported excellent ferritin 375.8 ng/mL and iron saturation 31% with free iron 82 TIBC 262.      3.  B12 deficiency.    · This patient has history of B12 deficiency and was given B12 injection previously.    · She continues on oral B12 at 1000 mcg daily with a B12 level within normal limits 12/29/2020, 916    4.  Progressive neurologic symptoms  · The patient reports a decline over the past 2 months with unexpected weight loss, lower extremity weakness, development of a stutter  · She is following up with neurology related to her multiple sclerosis, who does not feel her symptoms are related to her MS as her most recent MRI was fairly stable  · Patient question symptomatology related to antiphospholipid syndrome or other autoimmune etiology, she is requesting referral to rheumatology at this time.  She reports her neurologist is also recommended referral to rheumatology.     5.  Significant chronic fatigue.  · On 6/3/2021, patient reports she is worsening chronic fatigue, very significant, she has no energy at all, lies on bed or sitting in couch most the time.    · Patient reports she is not depressed at all.  · I recommended to obtain comprehensive thyroid profile for assessment.    6.  Peripheral cyanosis.  · 6/3/2021, patient reports her fingers and toes will return to blue color oftentimes despite of taking Eliquis for anticoagulation.  She reports no claudication.    · By examination, her finger feels cool to touch.  No signs of ischemia.  · I recommended laboratory studies for cold agglutinin and cryoglobulin for assessment.      PLAN:    1. Obtain laboratory studies for cryoglobulin and cold agglutinin for assessment of peripheral cyanosis.    2. Obtain left leg venous Doppler study for  evaluation of leg edema.    3. Obtain complete a thyroid profile for assessment of chronic worsening fatigue.    4. Continue Eliquis 5 mg twice daily  5. No need for IV iron treatment today.  6. The patient was encouraged to continue to follow-up with neurology  7. Arrange follow-up with me in 2 weeks.  Telemedicine would be fine, no labs needed that day.      Addendum:   Component      Latest Ref Rng & Units 6/3/2021   25 Hydroxy, Vitamin D      30.0 - 100.0 ng/ml 27.1 (L)   TSH Baseline      0.270 - 4.200 uIU/mL 2.460   Free T4      0.93 - 1.70 ng/dL 1.02   T3, Free      2.00 - 4.40 pg/mL 2.98     Left leg venous Doppler study on 6/3/2021   Interpretation Summary    · Normal left lower extremity venous duplex scan.          I called and spoke with the patient, reporting complete normal thyroid profile.  There is no evidence for hypothyroidism.  So I do not know what caused her worsening fatigue.      She has vitamin D deficiency.  Patient reports that she is currently taking 50 mcg vitamin D equipment 2000 units.  I advised patient to take 6000 units every day, with the equivalent of 42,000 units/week.  She will need this dosage for about 8 weeks.     Venous Doppler study was negative for DVT.  She will continue current Eliquis anticoagulation.    Other labs are pending.  She will keep appointment with me in 2 weeks for reassessment.  Patient voiced understanding.        Total of 40 minutes were spent with the patient, in face-to-face counseling.       Johny Burgos MD PhD   06/03/2021

## 2021-06-06 PROBLEM — E55.9 VITAMIN D DEFICIENCY: Status: ACTIVE | Noted: 2021-06-06

## 2021-06-07 ENCOUNTER — TELEPHONE (OUTPATIENT)
Dept: CARDIOLOGY | Facility: CLINIC | Age: 41
End: 2021-06-07

## 2021-06-07 LAB
CA TITR SERPL AGGL: NORMAL {TITER}
CRYOGLOB SER QL 1D COLD INC: NORMAL

## 2021-06-07 NOTE — TELEPHONE ENCOUNTER
"----- Message from Sharyn Jones sent at 6/7/2021 11:18 AM EDT -----  Regarding: Visit Follow-Up Question  Contact: [phone]  I have been experiencing increasing difficulty breathing that is worse when laying down and an increase in episodes of chest pain and pain in my back between my shoulder blades that present as common cardiac pain symptoms in patients like me. Dr. Braun also noted on a recent holter monitor I was having unusual drops in heart rate at night. I have a history of autoimmune issues (Narcolepsy, Hughs, and Sjögrens) which increase my risk of vascular issues. In my last visit with Dr. Braun I asked about my chronic chest pain issue relating to autoimmune disease, and he instructed me to \"Google it\".  I did, and I found that I have symptoms/history that strongly associate with  syndrome X /microvascular. I would like to address this. Thank you  "

## 2021-06-07 NOTE — TELEPHONE ENCOUNTER
Noni--  Looks like Dr. Riggins had ordered a coronary CTA but I do not see that patient got this done, it looks like she canceled it unfortunately.  Please see if she is willing to get this done but would have her go to the ER for any severe or unrelieved symptoms prior to that time.

## 2021-06-08 NOTE — TELEPHONE ENCOUNTER
2nd attempt to reach patient. Voicemail left requesting return call.    Thank you,  Noni Mosqueda RN  [facility]  Triage

## 2021-06-09 NOTE — TELEPHONE ENCOUNTER
Spoke with the patient and she is going to call today to reschedule.    Thank you,  Noni Mosqueda RN  [facility]  Triage

## 2021-06-09 NOTE — TELEPHONE ENCOUNTER
"I spoke with . she canceled her last appointment because she had one before and they gave her \"something to slow her HR down\" and shehad a syncopal episode.  She is willing to have the CTA done and states that her average BP now has been in the 50s. She's wondering if she can do the test without the medication at that rate.    Thank you,  Noni Mosqueda RN  [facility]  Triage    "

## 2021-06-09 NOTE — TELEPHONE ENCOUNTER
Yes, if heart rate staying in the 50s and should be able to do the procedure without any medication.  Please have her call to reschedule.      DR. BAUTISTA----   EDMUND.  Please let me know if you have anything more to add.

## 2021-06-17 ENCOUNTER — TELEMEDICINE (OUTPATIENT)
Dept: ONCOLOGY | Facility: CLINIC | Age: 41
End: 2021-06-17

## 2021-06-17 DIAGNOSIS — E55.9 VITAMIN D DEFICIENCY: ICD-10-CM

## 2021-06-17 DIAGNOSIS — E53.8 B12 DEFICIENCY: ICD-10-CM

## 2021-06-17 DIAGNOSIS — I63.9 CEREBROVASCULAR ACCIDENT (CVA), UNSPECIFIED MECHANISM (HCC): Primary | ICD-10-CM

## 2021-06-17 DIAGNOSIS — Z79.01 ANTICOAGULATION ADEQUATE: ICD-10-CM

## 2021-06-17 DIAGNOSIS — E61.1 IRON DEFICIENCY: ICD-10-CM

## 2021-06-17 DIAGNOSIS — R60.0 LEG EDEMA, LEFT: ICD-10-CM

## 2021-06-17 DIAGNOSIS — R53.82 CHRONIC FATIGUE: ICD-10-CM

## 2021-06-17 PROCEDURE — 99213 OFFICE O/P EST LOW 20 MIN: CPT | Performed by: INTERNAL MEDICINE

## 2021-06-17 NOTE — PROGRESS NOTES
.   REASON FOR FOLLOW UP:     1.  Anemia and possible blood clots with suspected episodes of strokes.   2.  Laboratory study on 5/31/2018 reported significantly elevated anti-phosphatidylserine IgM at 57, and marginally elevated anticardiolipin IgM 14.  All others were negative.   3.  Laboratory tests confirmed vitamin B12 deficiency and iron deficiency on 5/31/2018.    4.  Repeated laboratory study on 9/13/2018 showed persistently significantly elevated anti-phosphatidylserine IgM at 65.  She also has persistent significant iron deficiency and vitamin B12 deficiency.   5.  Patient was not able to tolerate oral iron due to significant constipation, she was given 2 doses Injectafer in end of September 2018, with significant improved iron panel and normalization of hemoglobin in December 2018.   6.  Patient had recurrent iron deficiency in June 2019 due to heavy menses.  Patient was given Injectafer weekly for 2 doses.  Her vitamin B12 level improved at 404 on 6/24/2019.    7.  Patient now taking vitamin B12 supplement with great tolerance.   8.  Vitamin D deficiency confirmed on 6/3/2021.         HISTORY OF PRESENT ILLNESS:  The patient is a 41 y.o. year old female with the above-mentioned history, presented for telemedicine with video conference.      I saw her 2 weeks ago and she has multiple complaints, including left leg swelling, significant fatigue, cyanosis of fingers and toes, and body aches.  We obtained the laboratory studies for assessment and that she was found to having vitamin D deficiency, despite of taking oral vitamin D 2000 units for about 3 to 4 months.  She also had a negative venous Doppler study of the left leg.  We also obtained lab studies for cryoglobulins and cold agglutinin for assessment of cyanosis.      Her left leg venous Doppler study was negative for DVT.    I spoke with the patient after he received the vitamin D results, and recommended to increase oral vitamin D to 3 tablets a  day equivalent to 6000 units a day.  Patient reports this increased dose of vitamin D causes chest discomfort and that wake up in the night.  She actually stopped taking vitamin D and feels better with resolution of symptoms.  But anyway she was recently evaluated by cardiology service, and currently carries a cardiac monitor and will finish exam tomorrow with a further follow-up with cardiology.     Lab study reported no no evidence of hypothyroidism.  She has completed normal TSH, free T4 and free T3.  Liver study also negative for cold agglutinin and cryoglobulin.    Patient continues to have significant fatigue, more so recently, she has no energy at all, she lies in bed or sitting couch most of the time.  She reports exertional dyspnea, and the worn out.  She also reports cyanosis involving fingers and toes very often, she also reports cramping in her legs often time.  She also reports dizziness, and was seen by her neurologist.  Patient reports that she feels something is wrong with her.    Patient reports that she is waiting to see a rheumatologist in the coming months.          HEMATOLOGIC/ONCOLOGIC HISTORY:  The patient is a 38 y.o. year old female who is here for initial evaluation with the above-mentioned history on 5/31/2018.    Patient presented for evaluation reported by her primary care physician Dr. Bravo because of recurrent episodes of blurry visions.  Patient also has abnormal brain MRI examination on multiple occasions.  She was origin of diagnosis of multiple sclerosis, however was told by urologist Dr. Khanna that she did not have multiple sclerosis.  Most recently patient was seen by a different neurologist Dr. Scott that her MRI images did not fit with typical MS changes, instead it may be changes related to thrombotic stroke.  This patient has chronic migraine headache, and was taking Topamax low-dose with good results but with side effects and the medicine was recently changed to Trokendl  XR.     This patient also has history of iron deficiency for which she has been taking oral iron for many years.  She also had vitamin B12 deficiency.  Her laboratory study on 11/20/2017 clearly reported iron deficiency with ferritin 8 ng/mL, and she was already mildly anemic with hemoglobin 11.3, and MCV 82.9. She had a normal WBC 6700 including neutrophils 3850 and lymphocytes 2190, monocytes 420.  Her platelets was 323,000.     Laboratory study on 3/23/2018 reported hemoglobin 11.6, MCV 83.1 MCHC 33.6.  Her WBC was 7900 including neutrophils 5240, lymphocytes 1980, monocytes 500.  Her platelets were 341,000.  Her iron studies reported free iron 106 TIBC 365 and iron saturation 29%, vitamin B12 at 242 pg/mL and vitamin D 20 ng/mL.  There was no ferritin level nor folic acid level.      Laboratory studies we obtained on 05/31/2018 showed significantly elevated antiphosphatidylserine antibody IgM subtype at 57, but negative for IgG and IgA subtype.  She is also marginally positive for anticardiolipin IgM at 14, but negative for IgG subtype.  She was tested negative for other hypercoagulable tests including anti-Beta-2 glycoprotein 1 antibodies, negative for lupus anticoagulant and normal antithrombin 103%, normal protein C activity 117%, normal protein S activity 81% with free protein S antigen 90%.  She is also negative for factor II mutation and factor V Leiden mutation.  We also checked her for comprehensive rheumatoid panel which is all negative as well as negative for the rheumatoid factor and direct WANG.  She also had iron deficiency, ferritin 12.5, iron saturation 12% 05/31/2018.     The patient presented on 6/14/2018 for reevaluation and to discuss laboratory results obtained on 05/31/2018 when I saw her for the 1st time for evaluation of possible thrombosis as well as persistent anemia.  The patient reports no changes in her clinical condition the past 6 days.  No recent episodes of blurry vision.  She is  on low-dose aspirin 81 mg daily.  The patient also has been on low-dose oral iron 28 mg daily with good compliance.  She was not able to tolerate the large dose of ferrous sulfate at 65 mg tablets because of significant constipation.  The patient is not really compliant with oral vitamin B12 supplementation.    We started patient on Eliquis 5 mg twice a day on 6/14/2018.  This patient is likely having antiphospholipid antibody syndrome.    In September 2018, patient reports she has no recurrence of blurry vision, no headaches and dizziness since she started on Eliquis 5 mg twice a day in middle June 2018.  She feels more fit.  Her  also reports patient is more energetic in the past 3 months.  Patient reports no easy bleeding or bruising.  She does note somewhat increased volume of her menses.    Patient reports she was not able to tolerate even low-dose oral iron with significant constipation.  She also reports vitamin B12 causes palpitation.  So currently she is not taking oral iron or vitamin B-12.      Laboratory results on 9/13/2018 reported persistently elevated anti-phosphatidylserine IgM at 65, however resolution of mildly elevated anticardiolipin IgM antibody.  She has persistent iron deficiency with ferritin 8 ng/mL, iron saturation 7%, both of which actually worse than those levels on 5/31/2018.  Her B12 level also is low at 236 pg/mL.  She has worsening anemia with hemoglobin 11.2 but maintains normal WBC 6500 and platelets 292,000.     Patient had IV Injectafer at the end of September and early October.  She reports improved energy level.      Laboratory studies on 12/13/2018 reported normalized hemoglobin at 12.8, MCV 88.0, MCHC 33.4.  She maintains normal platelets at 262,000 and WBC 7670.  Her iron study showed significantly improved ferritin 206 ng/mL and iron saturation 18%.  Her vitamin B12 level 264 pg/mL.      On 6/24/2019, the patient complained of recent palpitations, and was seen by a  "cardiologist, who prescribed her metoprolol 25 mg, however she has not started it yet.      Her laboratory study on 6/24/2019 reported normal hemoglobin 12.4, MCV 87.7, normal platelets 296,000 and WBC 6090.  It also reported recurrent iron deficiency with a ferritin 36.8, and iron saturation 10% free iron 34 TIBC 326.  Slightly improved vitamin B12 level at 404 pg/mL.  she has recurrent iron deficiency, most likely due to her heavy menses.  Patient was not able to tolerate oral iron.     Patient was given Injectafer 2 doses in July 2019.     Patient was given 1 dose of Injectafer in October 2020 due to recurrent iron deficiency.    In December 2020, she reports she had a heavy menses which lasted approximately 18 days.  She did hold her anticoagulation with no improvement in her menses.  She questions her need for iron.  She has attempted to take oral iron though is intolerant with abdominal discomfort.    She reports she has developed progressive neurologic symptoms with a stutter and generalized weakness.  She does regularly follow-up with neurology.  The patient is quite adamant that her symptoms are related to her antiphospholipid syndrome.  She is requesting referral to rheumatology today.    She does continue on Eliquis 5 mg twice daily with good tolerance.  She reports she is still \"clotting\" with intermittent discomfort in her upper and lower extremities which she attributes to blood clots.  Presently, she has no extremity pain, swelling or erythema.  She does continue on B12 supplementation.    She has vitamin D deficiency on 6/3/2021.  Patient reports that she is currently taking 50 mcg vitamin D equipment 2000 units.  I advised patient to take 6000 units every day, with the equivalent of 42,000 units/week.  She will need this dosage for about 8 weeks.     Venous Doppler study was negative for DVT.  She will continue current Eliquis anticoagulation.         There were no vitals filed for this " visit.  Current Status 6/3/2021   ECOG score 0     PHYSICAL EXAM: This is a telemedicine through video conference, no direct physical examination.  She looks about the same as I saw her 2 weeks ago, well-developed, well-nourished in no acute distress.  Normal affect and mood.  Carries conversation well.      RECENT LABS:  Lab Results   Component Value Date    WBC 7.10 06/03/2021    HGB 13.0 06/03/2021    HCT 39.9 06/03/2021    MCV 89.9 06/03/2021     06/03/2021     Lab Results   Component Value Date    GLUCOSE 101 (H) 06/03/2021    BUN 5 (L) 06/03/2021    CREATININE 0.68 06/03/2021    EGFRIFNONA 96 06/03/2021    BCR 7.4 06/03/2021    K 4.1 06/03/2021    CO2 27.5 06/03/2021    CALCIUM 9.6 06/03/2021    ALBUMIN 4.30 06/03/2021    LABIL2 1.1 09/19/2019    AST 12 06/03/2021    ALT 8 06/03/2021     Lab Results   Component Value Date    IRON 82 06/03/2021    TIBC 262 (L) 06/03/2021    FERRITIN 375.80 (H) 06/03/2021   Iron saturation 31% on 6/3/2021.     Component      Latest Ref Rng & Units 6/3/2021   25 Hydroxy, Vitamin D      30.0 - 100.0 ng/ml 27.1 (L)   TSH Baseline      0.270 - 4.200 uIU/mL 2.460   Free T4      0.93 - 1.70 ng/dL 1.02   T3, Free      2.00 - 4.40 pg/mL 2.98   Cryoglobulin, Ql, Serum, Rflx      None detected Comment:   Not detected   Cold Agglutinin Titer      Neg <1:32 1:4                       IMAGING STUDY:   Venous Doppler study 6/3/2021   interpretation Summary    · Normal left lower extremity venous duplex scan.              Chest angiogram study  DATE: 5/17/2021 2:10 PM     PROVIDED INDICATION: 41 years old Female with history of  Patient on factor V 1 deficiency chest pain shortness of breath.     COMPARISON: None.     TECHNIQUE: A CT PE protocol examination of the chest with attention to the pulmonary arteries was performed from the diaphragms to the lung apices following the intravenous administration of contrast material. Images were obtained in the axial plane,   with coronal and  sagittal reconstructed images were also reviewed. 3-D reconstructions of the thoracic vasculature also obtained at a separate workstation.   Radiation dose reduction techniques were used for the scan per the ALARA (As Low As Reasonably Achievable) protocol.     FINDINGS:     This is a high quality study for the evaluation of the pulmonary arteries.   The pulmonary arteries are normal in appearance without filling defects to suggest presence of pulmonary emboli.     LUNGS AND AIRWAYS: The lungs are clear and are without pneumothoraces, focal infiltrates, or pleural effusions.   HEART AND MEDIASTINUM: The heart is normal in size. The mediastinum appears to be within normal limits.  There is no evidence of mediastinal, hilar or axillary adenopathy. The aorta is within normal limits and is without atherosclerotic disease.. Imaged   thyroid gland is unremarkable.   CHEST WALL: The soft tissues of the chest wall are within normal limits.   BONES: No acute or aggressive osseous abnormality.   IMAGED UPPER ABDOMEN: No acute or suspicious abnormality.     IMPRESSION:   1. No pulmonary artery filling defects to suggest pulmonary emboli.   2. No acute thoracic process.     Dictated by: Gomez Sanford M.D.         Assessment/Plan      1.  Antiphospholipid antibody syndrome, with significantly anti-phosphatidylserine IgM antibody twice more than 3 months apart.    · This patient had symptoms with blurry vision, bilateral, ? Cerebrovascular accident (CVA).  This patient has multiple major complaints, and was thought related to multiple sclerosis, however she was told by 2 different neurologists that the MRI changes did not fit with multiple sclerosis.  Instead it may be more of stroke.  This patient also has intermittent lower extremity edema although no previous evidence of venous thrombosis.   · May 2018 significantly elevated antiphosphatidylserine antibody IgM at 57.  We repeated laboratory study on 9/13/2018, she had  persistently elevated anti-phosphatidylserine antibody IgM 65.  So she has antiphospholipid syndrome.    · She initiated Eliquis 5 mg twice daily mid June 2018.  This resulted in resolution of blurry vision and numbness of her hands  · Eliquis was then dose reduced to 2.5 mg twice daily due to heavy menses and recurrent iron deficiency  · April 2020, she resumed 5 mg twice daily due to chest tightness and intermittent lower extremity swelling.  Patient did not have confirmed progressive thrombosis at that time  · The patient feels quite adamantly presently that her progressive neurologic symptoms are related to antiphospholipid antibody syndrome and request referral to rheumatology.  This was facilitated in March 2021. She will continue on Eliquis 5 mg twice daily  · On 6/3/2021, patient reports she has left leg edema last week.  She feels slightly better today.  Her venous Doppler study was negative for DVT in the left leg on 6/3/2021.   · She will continue Eliquis 5 mg twice a day.       2.  Iron deficiency anemia, secondary to heavy menses.    · Intolerant to oral iron with significant constipation  · We treated her with 2 doses of intravenous iron with Injectofar in end of September and early October 2018.   · She had IV iron in late June and early July 2019 because recurrent iron deficiency.    · She reports heavy menses.  She did discuss with GYN who recommended ablation, nonhormonal IUD or hysterectomy.  · Recurrent iron deficiency anemia with Hb 11.5, ferritin 16.9 and iron saturation 10% on 10/7/2020.  Patient was given Injectafer 1 dose in October 2020.  · Patient reports in December 2020 she had a heavy menses, one-time it lasted for 18 days despite holding anticoagulation.  Labs 12/29/2020 with recurrent iron deficiency, ferritin of 42, iron saturation of 9%, hemoglobin 10.6.  No IV iron infusion.  · Persistent worsening iron deficiency with ferritin 29, iron saturation 8% on 1/29/2021.  Patient had  Injectafer 2 doses in February 2021  · Laboratory studies on 4/23/2021 reported much improved iron study with ferritin 400.7 ng/mL, iron saturation 24%, and improved hemoglobin 13.0.  · On 6/3/2021, patient maintains normal hemoglobin 13.0.  Iron study reported excellent ferritin 375.8 ng/mL and iron saturation 31% with free iron 82 TIBC 262.      3.  B12 deficiency.    · This patient has history of B12 deficiency and was given B12 injection previously.    · She continues on oral B12 at 1000 mcg daily with a B12 level at 916 pg/mL on 12/29/2020.   · Continue oral vitamin B12.    4.  Progressive neurologic symptoms  · The patient reports a decline over the past 2 months with unexpected weight loss, lower extremity weakness, development of a stutter  · She is following up with neurology related to her multiple sclerosis, who does not feel her symptoms are related to her MS as her most recent MRI was fairly stable  · Patient question symptomatology related to antiphospholipid syndrome or other autoimmune etiology, she is requesting referral to rheumatology at this time.  She reports her neurologist is also recommended referral to rheumatology.  Patient is waiting to see rheumatologist.    5.  Significant chronic fatigue.  · On 6/3/2021, patient reports she is worsening chronic fatigue, very significant, she has no energy at all, lies on bed or sitting in couch most the time.    · Patient reports she is not depressed at all.  · I recommended to obtain comprehensive thyroid profile for assessment.  Patient had a complete normal thyroid profile on 6/3/2021.     6.  Peripheral cyanosis.  · 6/3/2021, patient reports her fingers and toes will return to blue color oftentimes despite of taking Eliquis for anticoagulation.  She reports no claudication.    · By examination, her finger feels cool to touch.  No signs of ischemia.  · I recommended laboratory studies for cold agglutinin and cryoglobulin for assessment.  Laboratory  study on 6/3/2021 reported negative results for both cold agglutinin and cryoglobulin.       PLAN:    1. Follow-up with the cardiology service.  2. I recommended to increase vitamin D again, try 4000 units daily to see if she can tolerate and whether that causes her chest pain.    3. Continue Eliquis 5 mg twice daily  4. Keep appointment for rheumatology evaluation.  5. The patient was encouraged to continue to follow-up with neurology  6. Arrange follow-up with me in 6 months, will obtain stat CBC, ferritin and iron profile, and is scheduled for possible IV Injectafer.       This is a video visit to discuss her recent laboratory study results.  Due to the pandemic coronavirus infection, to decrease the risk for exposure, we arranged this video conference and the patient is agreeable.        Johny Burgos MD PhD   06/17/2021

## 2021-10-04 ENCOUNTER — TELEPHONE (OUTPATIENT)
Dept: ONCOLOGY | Facility: CLINIC | Age: 41
End: 2021-10-04

## 2021-10-04 RX ORDER — APIXABAN 5 MG/1
TABLET, FILM COATED ORAL
Qty: 180 TABLET | Refills: 2 | Status: SHIPPED | OUTPATIENT
Start: 2021-10-04 | End: 2022-01-17

## 2021-10-04 NOTE — TELEPHONE ENCOUNTER
Eliquis refill request rec electronically from [facility]. Per last office note from Dr Burgos-pt is to continue.    Continue Eliquis 5 mg twice daily    I have routed the rx to Dr Burgos for signature. Once signed it will be escribed to [facility].

## 2021-10-06 NOTE — TELEPHONE ENCOUNTER
Called and spoke with her pt re: her c/o intermittent issues with her feet turning blue (no LE swelling), numbness in her hands and increasing fatigue. Did see rheumatologist but was unsure if she will be following up with her. States she saw a neurologist as well, but does not wish to continue to see this particular neurologist. Will discuss with Dr. Burgos and review rheumatology note and get back with pt.

## 2021-10-19 ENCOUNTER — HOSPITAL ENCOUNTER (OUTPATIENT)
Dept: PHYSICAL THERAPY | Facility: HOSPITAL | Age: 41
Setting detail: THERAPIES SERIES
Discharge: HOME | End: 2021-10-19

## 2021-10-19 DIAGNOSIS — R26.2 DIFFICULTY WALKING: Primary | ICD-10-CM

## 2021-10-19 PROCEDURE — 97161 PT EVAL LOW COMPLEX 20 MIN: CPT

## 2021-10-19 NOTE — THERAPY EVALUATION
.Outpatient Physical Therapy Neuro Initial Evaluation  [place]     Patient Name: Sharyn Jones  : 1980  MRN: [id number]  Today's Date: 10/19/2021      Visit Date: 10/19/2021    Patient Active Problem List   Diagnosis   • Iron (Fe) deficiency anemia   • B12 deficiency   • Blurry vision, bilateral   • Stroke (HCC)   • Skin macule or macular rash   • Hypercoagulation syndrome (HCC)   • Adverse effect of iron or its compound, initial encounter   • Numbness of fingers of both hands   • Palpitations   • Iron deficiency anemia   • TIA (transient ischemic attack)   • Anticoagulation adequate   • Iron adverse reaction   • PFO (patent foramen ovale)   • Precordial pain   • Paroxysmal SVT (supraventricular tachycardia) (HCC)   • Cyanosis of tip of finger   • Leg edema, left   • Chronic fatigue   • Vitamin D deficiency        Past Medical History:   Diagnosis Date   • Anemia     B12 anemia during pregnancy in    • Antiphospholipid syndrome (CMS/HCC)    • Autonomic dysfunction    • Blurry vision, bilateral    • Cervical cancer (CMS/HCC) 1965    Stage 0    • H/O HPV in female    • H/O Incomplete RBBB 2013   • H/O Raynaud phenomenon     Probable at age 31 during pregnancy   • History of prior pregnancies     x3   • History of syncope    • MS (multiple sclerosis) (CMS/HCC)    • Palpitation    • POTS (postural orthostatic tachycardia syndrome)    • Stroke (cerebrum) (CMS/HCC)    • Stroke (CMS/HCC)     Blood clot, age 26   • SVT (supraventricular tachycardia) (CMS/HCC)    • Tattoos    • TIA (transient ischemic attack) 2019        Past Surgical History:   Procedure Laterality Date   • CERVICAL BIOPSY  W/ LOOP ELECTRODE EXCISION           Visit Dx:     ICD-10-CM ICD-9-CM   1. Difficulty walking  R26.2 719.7        Patient History     Row Name 10/19/21 0900 10/17/21 1804          History    Chief Complaint Balance Problems; Difficulty Walking; Difficulty with daily activities; Falls/history of falls;  Fatigue/poor endurance; Impaired sensation  -JV Balance Problems; Difficulty Walking; Difficulty with daily activities; Dizziness; Falls/history of falls; Fatigue/poor endurance; Headache; Impaired sensation; Joint stiffness; Joint swelling; Muscle weakness; Numbness; Problems breathing/shortness of breath; Tightness; Tinglings; Ulcer, wound or other skin conditions; Other 1 (comment); Other 2 (comment)  -JV (r) patient (t)     Hx Chief Complaint Comment 1  This pt is 40 y/o F referred to PT for mobility assessment. The pt has diagnosis of dysautonomia, MS, TIA with swallowing problem, Raynaud's, tachycardia with syncope, orthostatic hypotension, and unstable R knee. The pt faints approximately twice per week.  -JV pots, heart rate rises from 56 at rest to between 130-157 when standing and often results in syncope  -JV (r) patient (t)     Hx Chief Complaint Comment 2 -- Circulation problems in hands and feet, but feet have been grey/blue for several weeks  -JV (r) patient (t)     Type of Pain Back pain; Chest pain; Foot pain; Hand pain; Jaw pain; Lower Extremity / Leg; Rib pain; Shoulder pain; Upper Extremity / Arm; Wrist pain  -JV Back pain; Chest pain; Foot pain; Hand pain; Jaw pain; Lower Extremity / Leg; Rib pain; Shoulder pain; Upper Extremity / Arm; Wrist pain  -JV (r) patient (t)     Date Current Problem(s) Began 04/01/18  -JV 04/01/18  -JV (r) patient (t)     Brief Description of Current Complaint Severe chronic fatigue and numbness in feet making walking difficult, seeking powered mobility aid  -JV Severe chronic fatigue and numbness in feet making walking difficult, seeking powered mobility aid  -JV (r) patient (t)     Patient/Caregiver Goals Improve mobility; Know what to do to help the symptoms  -JV Improve mobility; Know what to do to help the symptoms  -JV (r) patient (t)     Current Tobacco Use no  -JV --     Patient's Rating of General Health Fair  -JV --     Hand Dominance right-handed  -JV  right-handed  -JV (r) patient (t)     Occupation/sports/leisure activities games  -JV --     Patient seeing anyone else for problem(s)? -- no  -JV (r) patient (t)     What clinical tests have you had for this problem? X-ray; CT scan; MRI; Blood Work; Lumbar Puncture  -JV X-ray; CT scan; MRI; Blood Work; Lumbar Puncture  -JV (r) patient (t)     Are you or can you be pregnant No  -JV No  -JV (r) patient (t)            Pain     Pain Location Chest  -JV --     Pain at Present 4  -JV --            Fall Risk Assessment    Any falls in the past year: Yes  -JV Yes  -JV (r) patient (t)     Number of falls reported in the last 12 months >20  -JV --     Factors that contributed to the fall: Lost balance; Fatigue  -JV Fatigue; Didn't use assistive device; Other (comment)  -JV (r) patient (t)     Other factors that contributed to the fall: syncope  -JV syncope  -JV (r) patient (t)     Does patient have a fear of falling Yes (comment)  -JV --            Services    Prior Rehab/Home Health Experiences No  -JV No  -JV (r) patient (t)     Are you currently receiving Home Health services No  -JV No  -JV (r) patient (t)     Do you plan to receive Home Health services in the near future No  -JV No  -JV (r) patient (t)            Daily Activities    Primary Language English  -JV English  -JV (r) patient (t)     Are you able to read Yes  -JV Yes  -JV (r) patient (t)     Are you able to write Yes  -JV Yes  -JV (r) patient (t)     How does patient learn best? Listening  -JV Listening  -JV (r) patient (t)     Patient is concerned about/has problems with Difficulty with self care (i.e. bathing, dressing, toileting:; Performing home management (household chores, shopping, care of dependents); Standing; Walking  -JV --     Recommended Referrals Physician; Other (comment)  Ortho assessment R knee  -JV --            Safety    Are you being hurt, hit, or frightened by anyone at home or in your life? No  -JV No  -JV (r) patient (t)     Are you  being neglected by a caregiver No  -JV No  -JV (r) patient (t)     Have you had any of the following issues with N/A  -JV N/A  -JV (r) patient (t)           User Key  (r) = Recorded By, (t) = Taken By, (c) = Cosigned By    Initials Name Provider Type    JV Valerie Castro, PT Physical Therapist    patient Sharyn Jones --                     PT Neuro     Row Name 10/19/21 0900             Subjective Comments    Subjective Comments The pt reports she is having a good day today, but many days is unable to get out of bed due to fainting.  -JV              Precautions and Contraindications    Precautions/Limitations fall precautions  -JV              Subjective Pain    Able to rate subjective pain? yes  -JV      Pre-Treatment Pain Level 4  -JV      Post-Treatment Pain Level 4  -JV              Home Living    Current Living Arrangements home/apartment/condo  -JV      Home Accessibility wheelchair accessible  -JV      Home Equipment Other (Comment)  walking stick  -JV              Vision-Basic Assessment    Current Vision Wears glasses all the time  -JV      Patient Visual Report Unable to keep objects in focus; Balance difficulty; Blurring of vision when changing focal distance  -JV              Cognition    Overall Cognitive Status WFL  -JV      Orientation Level Oriented X4  -JV      Deficits Fully aware of deficits  -JV              Sensation    Sensation WNL? WNL  -JV      Light Touch No apparent deficits  -JV              Proprioception    Proprioception moderately decreased  -JV              Posture/Observations    Posture- WNL Posture is WNL  -JV              General ROM    GENERAL ROM COMMENTS Khoa LE ROM is WFL's  -JV              MMT (Manual Muscle Testing)    General MMT Comments Khoa LE's grossly 4-/5  -JV              Bed Mobility    Comment (Bed Mobility) Pt is independent with bed mob  -JV              Transfers    Sit-Stand [place] (Transfers) modified independence  -JV      Stand-Sit [place]  (Transfers) modified independence  -JV      Transfer, Safety Issues balance decreased during turns  -JV              Gait/Stairs (Locomotion)    [place] Level (Gait) modified independence  -JV      Distance in Feet (Gait) 25'  -JV      Pattern (Gait) step-through  -JV      Deviations/Abnormal Patterns (Gait) base of support, narrow; sung decreased; gait speed decreased  -JV      [place] Level (Stairs) not tested  -JV              Balance Skills Training    Sitting-Level of Assistance Independent  -JV      Sitting-Balance Support Feet supported  -JV      Standing-Level of Assistance Distant supervision  -JV      Static Standing Balance Support No upper extremity supported  -JV      Gait Balance-Level of Assistance Distant supervision  -JV      Gait Balance Support No upper extremity supported  -JV            User Key  (r) = Recorded By, (t) = Taken By, (c) = Cosigned By    Initials Name Provider Type    Valerie Chacon, PT Physical Therapist                        Therapy Education  Education Details: Educated pt regarding process for obtaining DME, recommended Ortho appt to assess R knee  Given: Symptoms/condition management  Program: New  How Provided: Verbal  Provided to: Patient  Level of Understanding: Teach back education performed, Verbalized         PT Assessment/Plan     Row Name 10/19/21 1033          PT Assessment    Functional Limitations Decreased safety during functional activities; Impaired gait; Impaired locomotion; Limitation in home management; Limitations in community activities; Limitations in functional capacity and performance; Performance in leisure activities; Performance in self-care ADL  -JV     Impairments Balance; Coordination; Endurance; Gait; Joint integrity; Locomotion; Muscle strength; Pain  -JV     Assessment Comments This pt is 42 y/o F with complicated medical history referred for PT evaluation of mobility. AT specialist from [facility] was present and participating  throughout. The pt's diagnoses include autonomic dysfunction, hx of TIA with swallowing difficulty, Raynaud's syndrome that makes her hands and feet numb, tachycardia with syncope, orthostatic hypotension with syncope, unstable R knee, frequent falls due to fainting and possible MS. Currently, the pt spends several days a week in bed due to safety concerns when walking. The pt is able to walk short distances without an AD, but experiences tachycardia or hypotension in standing which results in fainting approximately twice per week. This makes it unsafe for the pt to use any type of upright AD like a walker or cane. The pt is unable to propel a manual w/c due to numbness in her hands. A power scooter is not safe due to the risk of tipping over and pt being unable to right herself due to LE weakness and numbness in her feet. At this time, the most appropriate DME to meet her needs is a power w/c. A [employer] with a captain's seat  is recommended to help improve her independence with functional mob tasks in her home. The pt can safely transfer to a power chair and remaining in a seated position will decrease her risk of falls from fainting while in an upright standing position.  -JV     Please refer to paper survey for additional self-reported information Yes  -JV            PT Plan    Predicted Duration of Therapy Intervention (PT) Eval only  -JV     PT Plan Comments Eval only for mobility assessment.  -JV           User Key  (r) = Recorded By, (t) = Taken By, (c) = Cosigned By    Initials Name Provider Type    Valerie Chacon, PT Physical Therapist                    OP Exercises     Row Name 10/19/21 0900             Subjective Comments    Subjective Comments The pt reports she is having a good day today, but many days is unable to get out of bed due to fainting.  -JV              Subjective Pain    Able to rate subjective pain? yes  -JV      Pre-Treatment Pain Level 4  -JV      Post-Treatment Pain Level 4   -CONSUELO            User Key  (r) = Recorded By, (t) = Taken By, (c) = Cosigned By    Initials Name Provider Type    Valerie Chacon, PT Physical Therapist                            Outcome Measure Options: Lower Extremity Functional Scale (LEFS)  Lower Extremity Functional Index  Any of your usual work, housework or school activities: Moderate difficulty  Your usual hobbies, recreational or sporting activities: Quite a bit of difficulty  Getting into or out of the bath: Moderate difficulty  Walking between rooms: Moderate difficulty  Putting on your shoes or socks: No difficulty  Squatting: Moderate difficulty  Lifting an object, like a bag of groceries from the floor: Quite a bit of difficulty  Performing light activities around your home: Moderate difficulty  Performing heavy activities around your home: Extreme difficulty or unable to perform activity  Getting into or out of a car: Moderate difficulty  Walking 2 blocks: Extreme difficulty or unable to perform activity  Walking a mile: Extreme difficulty or unable to perform activity  Going up or down 10 stairs (about 1 flight of stairs): Quite a bit of difficulty  Standing for 1 hour: Extreme difficulty or unable to perform activity  Sitting for 1 hour: Moderate difficulty  Running on even ground: Extreme difficulty or unable to perform activity  Running on uneven ground: Extreme difficulty or unable to perform activity  Making sharp turns while running fast: Extreme difficulty or unable to perform activity  Hopping: Quite a bit of difficulty  Rolling over in bed: No difficulty  Total: 26    Time Calculation:   Start Time: 0900  Stop Time: 0945  Time Calculation (min): 45 min   Therapy Charges for Today     Code Description Service Date Service Provider Modifiers Qty    [id number]  PT EVAL LOW COMPLEXITY 3 10/19/2021 Valerie Castro, PT GP 1          PT G-Codes  Outcome Measure Options: Lower Extremity Functional Scale (LEFS)  Total: 26         Valerie Castro  PT  10/19/2021

## 2021-11-22 ENCOUNTER — TELEPHONE (OUTPATIENT)
Dept: GENERAL RADIOLOGY | Facility: HOSPITAL | Age: 41
End: 2021-11-22

## 2021-11-22 ENCOUNTER — TELEPHONE (OUTPATIENT)
Dept: ONCOLOGY | Facility: CLINIC | Age: 41
End: 2021-11-22

## 2021-11-22 NOTE — TELEPHONE ENCOUNTER
----- Message from Elyse Guan RN sent at 11/22/2021  1:27 PM EST -----  Regarding: Lab Only  If I sent this earlier I apologize patient needs a lab only appointment to have a CBC, Ferrititn and Iron drawn. Today if possible.  Thanks

## 2021-11-22 NOTE — TELEPHONE ENCOUNTER
Called patient and informed her will try to schedule her for labs today and based on her results if she is Iron deficient will try to get insurance to approve IV Iron.    Patient v/u

## 2021-11-23 ENCOUNTER — TELEPHONE (OUTPATIENT)
Dept: ONCOLOGY | Facility: CLINIC | Age: 41
End: 2021-11-23

## 2021-11-23 ENCOUNTER — TELEPHONE (OUTPATIENT)
Dept: GENERAL RADIOLOGY | Facility: HOSPITAL | Age: 41
End: 2021-11-23

## 2021-11-23 ENCOUNTER — LAB (OUTPATIENT)
Dept: OTHER | Facility: HOSPITAL | Age: 41
End: 2021-11-23

## 2021-11-23 DIAGNOSIS — E61.1 IRON DEFICIENCY: ICD-10-CM

## 2021-11-23 LAB
BASOPHILS # BLD AUTO: 0.04 10*3/MM3 (ref 0–0.2)
BASOPHILS NFR BLD AUTO: 0.6 % (ref 0–1.5)
DEPRECATED RDW RBC AUTO: 39.9 FL (ref 37–54)
EOSINOPHIL # BLD AUTO: 0.15 10*3/MM3 (ref 0–0.4)
EOSINOPHIL NFR BLD AUTO: 2.2 % (ref 0.3–6.2)
ERYTHROCYTE [DISTWIDTH] IN BLOOD BY AUTOMATED COUNT: 12.3 % (ref 12.3–15.4)
FERRITIN SERPL-MCNC: 56.9 NG/ML (ref 13–150)
HCT VFR BLD AUTO: 35.8 % (ref 34–46.6)
HGB BLD-MCNC: 11.3 G/DL (ref 12–15.9)
IMM GRANULOCYTES # BLD AUTO: 0.01 10*3/MM3 (ref 0–0.05)
IMM GRANULOCYTES NFR BLD AUTO: 0.1 % (ref 0–0.5)
IRON 24H UR-MRATE: 32 MCG/DL (ref 37–145)
IRON SATN MFR SERPL: 11 % (ref 20–50)
LYMPHOCYTES # BLD AUTO: 2.17 10*3/MM3 (ref 0.7–3.1)
LYMPHOCYTES NFR BLD AUTO: 31.7 % (ref 19.6–45.3)
MCH RBC QN AUTO: 27.9 PG (ref 26.6–33)
MCHC RBC AUTO-ENTMCNC: 31.6 G/DL (ref 31.5–35.7)
MCV RBC AUTO: 88.4 FL (ref 79–97)
MONOCYTES # BLD AUTO: 0.41 10*3/MM3 (ref 0.1–0.9)
MONOCYTES NFR BLD AUTO: 6 % (ref 5–12)
NEUTROPHILS NFR BLD AUTO: 4.06 10*3/MM3 (ref 1.7–7)
NEUTROPHILS NFR BLD AUTO: 59.4 % (ref 42.7–76)
NRBC BLD AUTO-RTO: 0 /100 WBC (ref 0–0.2)
PLATELET # BLD AUTO: 291 10*3/MM3 (ref 140–450)
PMV BLD AUTO: 9.4 FL (ref 6–12)
RBC # BLD AUTO: 4.05 10*6/MM3 (ref 3.77–5.28)
TIBC SERPL-MCNC: 299 MCG/DL (ref 298–536)
TRANSFERRIN SERPL-MCNC: 201 MG/DL (ref 200–360)
WBC NRBC COR # BLD: 6.84 10*3/MM3 (ref 3.4–10.8)

## 2021-11-23 PROCEDURE — 84466 ASSAY OF TRANSFERRIN: CPT | Performed by: INTERNAL MEDICINE

## 2021-11-23 PROCEDURE — 83540 ASSAY OF IRON: CPT | Performed by: INTERNAL MEDICINE

## 2021-11-23 PROCEDURE — 36415 COLL VENOUS BLD VENIPUNCTURE: CPT

## 2021-11-23 PROCEDURE — 85025 COMPLETE CBC W/AUTO DIFF WBC: CPT | Performed by: INTERNAL MEDICINE

## 2021-11-23 PROCEDURE — 82728 ASSAY OF FERRITIN: CPT | Performed by: INTERNAL MEDICINE

## 2021-11-23 RX ORDER — ACETAMINOPHEN 325 MG/1
650 TABLET ORAL ONCE
Status: CANCELLED | OUTPATIENT
Start: 2022-06-30

## 2021-11-23 RX ORDER — DIPHENHYDRAMINE HCL 25 MG
25 CAPSULE ORAL ONCE
Status: CANCELLED | OUTPATIENT
Start: 2022-06-09

## 2021-11-23 RX ORDER — SODIUM CHLORIDE 9 MG/ML
250 INJECTION, SOLUTION INTRAVENOUS ONCE
Status: CANCELLED | OUTPATIENT
Start: 2022-06-30

## 2021-11-23 RX ORDER — SODIUM CHLORIDE 9 MG/ML
250 INJECTION, SOLUTION INTRAVENOUS ONCE
Status: CANCELLED | OUTPATIENT
Start: 2022-06-09

## 2021-11-23 RX ORDER — SODIUM CHLORIDE 9 MG/ML
250 INJECTION, SOLUTION INTRAVENOUS ONCE
Status: CANCELLED | OUTPATIENT
Start: 2022-06-16

## 2021-11-23 RX ORDER — ACETAMINOPHEN 325 MG/1
650 TABLET ORAL ONCE
Status: CANCELLED | OUTPATIENT
Start: 2022-06-09

## 2021-11-23 RX ORDER — DIPHENHYDRAMINE HCL 25 MG
25 CAPSULE ORAL ONCE
Status: CANCELLED | OUTPATIENT
Start: 2022-06-16

## 2021-11-23 RX ORDER — DIPHENHYDRAMINE HCL 25 MG
25 CAPSULE ORAL ONCE
Status: CANCELLED | OUTPATIENT
Start: 2022-06-30

## 2021-11-23 RX ORDER — ACETAMINOPHEN 325 MG/1
650 TABLET ORAL ONCE
Status: CANCELLED | OUTPATIENT
Start: 2022-06-16

## 2021-11-23 NOTE — TELEPHONE ENCOUNTER
----- Message from Elyse Guan RN sent at 11/23/2021 10:46 AM EST -----  Regarding: Appointment for Venofer  Patient needs to be scheduled for Venofer 300 mg times 3 treatments as soon as possible. I have spoken with patient and she knows it might not be until next week.  Thanks  ----- Message -----  From: Ale Rose RN  Sent: 11/23/2021  10:17 AM EST  To: Elyse Guan RN    The dose limit is 1000 mg  ----- Message -----  From: Elyse Guan RN  Sent: 11/23/2021   9:42 AM EST  To: Ale Rose RN    Per Dr. Burgos Venofer 300 mg times 5 doses.Thanks  ----- Message -----  From: Ale Rose RN  Sent: 11/23/2021   9:24 AM EST  To: Elyse Guan RN    Will need to be one of the three other medications as she has [place].  ----- Message -----  From: Elyse uGan RN  Sent: 11/23/2021   9:17 AM EST  To: [facility] Pre-Cert Inbasket Pool    Patient needs insurance approval for Injectafer?  Thanks

## 2021-11-23 NOTE — TELEPHONE ENCOUNTER
PT HAS BEEN CALLED ABOUT HER APPTS AND IS AWARE THAT THE DAY SHE WILL SEE DR YANG - SHE WILL HAVE TO COME IN THE NEXT DAY FOR HER VENOFER AS THESE HAVE TO BE 7 DAYS APART

## 2021-11-23 NOTE — TELEPHONE ENCOUNTER
Patient had CBC, Ferritin and Iron profile drawn this morning.   Ferritin - 56.9  Iron - 32  Sat% - 11  Message sent to pre-cert for insurance approval for IV Iron.    Patient v/u

## 2021-12-03 ENCOUNTER — INFUSION (OUTPATIENT)
Dept: ONCOLOGY | Facility: HOSPITAL | Age: 41
End: 2021-12-03

## 2021-12-03 VITALS
BODY MASS INDEX: 23.22 KG/M2 | HEART RATE: 78 BPM | OXYGEN SATURATION: 98 % | WEIGHT: 136 LBS | SYSTOLIC BLOOD PRESSURE: 108 MMHG | TEMPERATURE: 96.8 F | DIASTOLIC BLOOD PRESSURE: 74 MMHG | HEIGHT: 64 IN | RESPIRATION RATE: 18 BRPM

## 2021-12-03 NOTE — NURSING NOTE
"Pt here for Venofer infusion, but very nervous and anxious about receiving a different iron infusion as pt had received Injectafer in the past and tolerated well. She is nervous about a reaction to venofer. Per pt she is sensitive to medications and \"cannot take Benadryl\" and was nervous to even take Tylenol as part of premedications. She had driven herself. Explained her insurance did not approve Injectafer and she could decline premedications as we would monitor her for a reaction regardless. Spoke with Elyse, clinical RN for Dr. Burgos who advised pt could reach out there her insurance company and/or decide if she wanted to restart the oral iron and begin something to help with constipation. Pt stated she would like to contact insurance company herself to state her case that she would prefer Injectafer as she had tolerated in the past. She will do this and reach out to our office with an update or if she would like to restart oral iron. Pt keeping her future appointments as she will bring her  to the next appointment should she have no other option than to have the venofer. We did discuss pt could possibly be given Pepcid in lieu of the benadryl should she decide to proceed with the venofer.   "

## 2021-12-09 ENCOUNTER — TELEPHONE (OUTPATIENT)
Dept: ONCOLOGY | Facility: CLINIC | Age: 41
End: 2021-12-09

## 2021-12-09 NOTE — TELEPHONE ENCOUNTER
Caller: Sharyn Jones    Relationship to patient: Self    Best call back number: [phone]    Chief complaint: PATIENT WANTS TO RESCHEDULE HER INFUSION APPT FOR 12/10/21, AS HER INSURANCE HAS NOT YET APPROVED A SWITCH TO INJECTAFER     Type of visit: INFUSION     Additional notes: PLEASE CONTACT PATIENT TO RESCHEDULE

## 2021-12-10 ENCOUNTER — APPOINTMENT (OUTPATIENT)
Dept: ONCOLOGY | Facility: HOSPITAL | Age: 41
End: 2021-12-10

## 2021-12-16 ENCOUNTER — APPOINTMENT (OUTPATIENT)
Dept: ONCOLOGY | Facility: HOSPITAL | Age: 41
End: 2021-12-16

## 2021-12-17 ENCOUNTER — APPOINTMENT (OUTPATIENT)
Dept: ONCOLOGY | Facility: HOSPITAL | Age: 41
End: 2021-12-17

## 2022-01-17 RX ORDER — APIXABAN 5 MG/1
TABLET, FILM COATED ORAL
Qty: 180 TABLET | Refills: 0 | Status: SHIPPED | OUTPATIENT
Start: 2022-01-17 | End: 2022-03-21

## 2022-01-27 ENCOUNTER — APPOINTMENT (OUTPATIENT)
Dept: ONCOLOGY | Facility: HOSPITAL | Age: 42
End: 2022-01-27

## 2022-03-09 ENCOUNTER — OFFICE VISIT (OUTPATIENT)
Dept: CARDIOLOGY | Facility: CLINIC | Age: 42
End: 2022-03-09

## 2022-03-09 VITALS
DIASTOLIC BLOOD PRESSURE: 68 MMHG | HEART RATE: 76 BPM | SYSTOLIC BLOOD PRESSURE: 100 MMHG | BODY MASS INDEX: 24.24 KG/M2 | WEIGHT: 142 LBS | HEIGHT: 64 IN

## 2022-03-09 DIAGNOSIS — R00.2 PALPITATIONS: Primary | ICD-10-CM

## 2022-03-09 DIAGNOSIS — R55 VASOVAGAL SYMPTOM: ICD-10-CM

## 2022-03-09 DIAGNOSIS — R07.2 PRECORDIAL PAIN: ICD-10-CM

## 2022-03-09 PROCEDURE — 99214 OFFICE O/P EST MOD 30 MIN: CPT | Performed by: INTERNAL MEDICINE

## 2022-03-09 PROCEDURE — 93000 ELECTROCARDIOGRAM COMPLETE: CPT | Performed by: INTERNAL MEDICINE

## 2022-03-09 RX ORDER — HYDROXYCHLOROQUINE SULFATE 200 MG/1
200 TABLET, FILM COATED ORAL DAILY
COMMUNITY
Start: 2022-03-01 | End: 2022-09-01 | Stop reason: SINTOL

## 2022-03-09 NOTE — PROGRESS NOTES
"Date of Office Visit: 2022  Encounter Provider: Deborah Riggins MD  Place of Service: [facility] CARDIOLOGY  Patient Name: Sharyn Jones  :1980    Chief complaint  PFO    History of Present Illness   Patient is a 42-year-old female with history of stroke, autonomic dysfunction, antiphospholipid syndrome (on Eliquis chronically), Raynaud's syndrome, multiple sclerosis, POTS and supraventricular tachycardia.  Supraventricular tachycardia dates back to .  She deferred treatment with calcium channel blockers and beta-blockers in the past.  She also has a history of vasovagal syncope with a positive tilt table study in .  In  Holter showed one episode of supraventricular tachycardia no other arrhythmia.  In 2018 she complained of vision loss with paralysis and was diagnosed with antiphospholipid syndrome body and was started on Eliquis.  Echo showed normal systolic function without valvular heart disease and normal left atrial size.  This was done elsewhere.  On 2019 she was felt to have a TIA with confusion aphasia and weakness.  MRI reportedly showed \"dry strokes\".  There were no acute findings.  He was however felt to have had a TIA.  SHAKILA showed normal systolic function with no atrial thrombi.  A small PFO with right-to-left shunt was noted.  There is no significant valvular heart disease.  24 Holter showed episodes of supraventricular tachycardia that were asymptomatic.  On last seen in office on 2020 she had complained of atypical chest pain paresthesias palpitations.  24-hour Holter showed sinus rhythm with rare PACs and few episodes of supraventricular tachycardia 1 associated with fluttering.  She subsequently saw Dr. Nicole and had a 30-day monitor that was relatively benign with symptomatic PVCs.  Dr Nicole also did not feel patient met criteria for POTS.  He recommended treatment for narcolepsy.  Patient has subsequently been seen by another " cardiology group (Dr. Curran).  He recommended an echocardiogram and deferred additional antiplatelet therapy such as aspirin Plavix to oncology.  He suggested considering Florinef or midodrine in the future with more symptoms.  Repeat stress test was also recommended.  It appears echo and Holter were performed but we do not have results available.  I do not see that a stress test was obtained.  She then had an appointment to be seen by my nurse practitioner on 12/2/2021 but did not keep this appointment.    Patient states that she is active around her home though not regularly exercising.  She believes her chest pain has improved though for many years has had midsternal fullness that can radiate to her back described as a ballooning type sensation it is nonradiating.  It is not exertional occurs once a month she thinks it is slightly more frequent and more pronounced.  It does appear to occur when she is active X walking.  She notes that palpitations have worsened occur daily at times of stress and often with activity.  She is not wearing full support stockings only the light ones.  She notes that blood pressure checked sporadically at home have been low with systolics in the 80s to 90s she has intermittent rapid heartbeats listed on her machine/apple watch of 40s up to 120s at times.  These are all chronic complaints and findings.  She states however more recently Dr. Roque and Dr. Bravo wish to start her on Plaquenil and she is concerned about cardiac complications listed the insert.    Past Medical History:   Diagnosis Date   • Anemia     B12 anemia during pregnancy in 2014   • Antiphospholipid syndrome (HCC)    • Autonomic dysfunction    • Blurry vision, bilateral    • Cervical cancer (HCC) 1965    Stage 0    • H/O HPV in female    • H/O Incomplete RBBB 09/2013   • H/O Raynaud phenomenon     Probable at age 31 during pregnancy   • History of prior pregnancies     x3   • History of syncope    • MS (multiple  sclerosis) ([facility])    • Palpitation    • POTS (postural orthostatic tachycardia syndrome) 2014   • Stroke (cerebrum) ([facility])    • Stroke ([facility])     Blood clot, age 26   • SVT (supraventricular tachycardia) ([facility])    • Tattoos    • TIA (transient ischemic attack) 09/19/2019     Past Surgical History:   Procedure Laterality Date   • CERVICAL BIOPSY  W/ LOOP ELECTRODE EXCISION       Outpatient Medications Prior to Visit   Medication Sig Dispense Refill   • cholecalciferol (VITAMIN D3) 25 MCG (1000 UT) tablet Take 2,000 Units by mouth Daily.     • Eliquis 5 MG tablet tablet TAKE 1 TABLET BY MOUTH EVERY 12 HOURS 180 tablet 0   • hydroxychloroquine (PLAQUENIL) 200 MG tablet Take 200 mg by mouth Daily.     • polysacchar iron-FA-B12 (Ferrex 150 Forte) 150-1-25 MG-MG-MCG capsule capsule Take 1 capsule by mouth Daily With Breakfast. 90 capsule 3   • vitamin B-12 (CYANOCOBALAMIN) 100 MCG tablet Take 50 mcg by mouth Daily.       No facility-administered medications prior to visit.       Allergies as of 03/09/2022 - Reviewed 03/09/2022   Allergen Reaction Noted   • Epinephrine Other (See Comments) 05/13/2020   • Nitroglycerin Other (See Comments) 05/17/2021   • Codeine Rash 03/18/2018     Social History     Socioeconomic History   • Marital status:      Spouse name: Bebo   • Number of children: 3   • Years of education: College   Tobacco Use   • Smoking status: Never Smoker   • Smokeless tobacco: Never Used   Vaping Use   • Vaping Use: Never used   Substance and Sexual Activity   • Alcohol use: No     Comment: No caffeine use   • Drug use: No   • Sexual activity: Yes     Partners: Male     Birth control/protection: None     Family History   Problem Relation Age of Onset   • Other Mother         Heart murmur   • Uterine cancer Mother 38   • ADD / ADHD Brother    • Stroke Other    • Alzheimer's disease Other    • Alzheimer's disease Maternal Aunt      Review of Systems   Constitutional: Positive for malaise/fatigue. Negative for  "chills, fever, weight gain and weight loss.   Cardiovascular: Positive for leg swelling.   Respiratory: Negative for cough, snoring and wheezing.    Hematologic/Lymphatic: Negative for bleeding problem. Does not bruise/bleed easily.   Skin: Negative for color change.   Musculoskeletal: Positive for falls and joint pain. Negative for myalgias.   Gastrointestinal: Negative for melena.   Genitourinary: Negative for hematuria.   Neurological: Negative for excessive daytime sleepiness.   Psychiatric/Behavioral: Negative for depression. The patient is not nervous/anxious.         Objective:     Vitals:    03/09/22 1015   BP: 100/68   Pulse: 76   Weight: 64.4 kg (142 lb)   Height: 162.6 cm (64\")     Body mass index is 24.37 kg/m².    Vitals reviewed.   Constitutional:       Appearance: Well-developed.   Eyes:      General: No scleral icterus.        Right eye: No discharge.      Conjunctiva/sclera: Conjunctivae normal.      Pupils: Pupils are equal, round, and reactive to light.   HENT:      Head: Normocephalic.      Nose: Nose normal.   Neck:      Thyroid: No thyromegaly.      Vascular: No JVD.   Pulmonary:      Effort: Pulmonary effort is normal. No respiratory distress.      Breath sounds: Normal breath sounds. No wheezing. No rales.   Cardiovascular:      Normal rate. Regular rhythm. Normal S1. Normal S2.      Murmurs: There is no murmur.      No gallop.   Pulses:     Intact distal pulses.   Edema:     Peripheral edema absent.   Abdominal:      General: Bowel sounds are normal. There is no distension.      Palpations: Abdomen is soft.      Tenderness: There is no abdominal tenderness. There is no rebound.   Musculoskeletal: Normal range of motion.         General: No tenderness.      Cervical back: Normal range of motion and neck supple. Skin:     General: Skin is warm and dry.      Findings: No erythema or rash.   Neurological:      Mental Status: Alert and oriented to person, place, and time.   Psychiatric:         " Behavior: Behavior normal.         Thought Content: Thought content normal.         Judgment: Judgment normal.       Lab Review:     ECG 12 Lead    Date/Time: 3/9/2022 10:26 AM  Performed by: Deborah Riggins MD  Authorized by: Deborah Riggins MD   Comparison: compared with previous ECG   Similar to previous ECG  Rhythm: sinus rhythm  Conduction: non-specific intraventricular conduction delay  Other findings: non-specific ST-T wave changes    Clinical impression: abnormal EKG          Assessment:       Diagnosis Plan   1. Palpitations  ECG 12 Lead    Holter Monitor - 72 Hour Up To 15 Days   2. Vasovagal symptom  Compression Stockings    Adult Stress Echo W/ Cont or Stress Agent if Necessary Per Protocol   3. Precordial pain  Adult Stress Echo W/ Cont or Stress Agent if Necessary Per Protocol     Plan:       1.  Vasovagal syncope  with questionable POTS.  This is likely contributing to hypotension and palpitations.  I once again reiterated importance of staying hydrated which she states she tries to do.  She will avoid dehydration and heat exposure.  In addition she will will wear the 30 mmHg thigh-high support stockings which I have ordered for her.  She will keep foot 1 week blood pressure log and depending on these readings can consider addition of midodrine.  2.  Palpitations.  With the need to use Plaquenil and increased risk of arrhythmia as well as pressing worsening complaints of palpitations we will check a 2-week Zio patch though her palpitations historically have been chronic.  Suspect are due to volume depletion and decreased preload.  I have asked her to increase hydration and will utilize support stockings as above.  3.  Chest pain.  This remains most atypical though she believes it is worsened.  We will check a stress echocardiogram also check for pulmonary hypertension.  No pericardial effusion on prior CT scan  4.  PFO, on Eliquis therapy  5.  Antipspolpholipid syndrome.  Followed by Dr. Roque and   Shelly.  We will review cardiac status as above with stress echo and monitoring with Ziopatch.  These are normal, would proceed with the use of Plaquenil if this is what her rheumatologist and Dr. Bravo feel is the best option for her in regards to treatment of her autoimmune disease.  6.  Sjogren's, as above.  Sed rate chronically elevated  7.  History of TIA  8.  Weight loss.  She states she has had lost 30 pounds in the past year this has been attributed to autoimmune disease.  9.  Narcolepsy.  Untreated.  She forgot to follow-up with this physician but will contact them regarding this.  Would certainly avoid addition of other proarrhythmic agents if she is to use Plaquenil.    Outside records include echo from 6/2021 with normal systolic function no significant valvular heart disease or pericardial effusion.  72-hour monitor showed sinus rhythm with rare PACs PVCs.      Time Spent: I spent 35 minutes caring for Sharyn on this date of service. This time includes time spent by me in the following activities: preparing for the visit, reviewing tests, obtaining and/or reviewing a separately obtained history, performing a medically appropriate examination and/or evaluation, counseling and educating the patient/family/caregiver, ordering medications, tests, or procedures, documenting information in the medical record and independently interpreting results and communicating that information with the patient/family/caregiver.   I spent 1 minutes on the separately reported service of ECG. This time is not included in the time used to support the E/M service also reported today.        Your medication list          Accurate as of March 9, 2022 11:59 PM. If you have any questions, ask your nurse or doctor.            CONTINUE taking these medications      Instructions Last Dose Given Next Dose Due   cholecalciferol 25 MCG (1000 UT) tablet  Commonly known as: VITAMIN D3      Take 2,000 Units by mouth Daily.       Eliquis 5 MG  tablet tablet  Generic drug: apixaban      TAKE 1 TABLET BY MOUTH EVERY 12 HOURS       Ferrex 150 Forte 150-1-25 MG-MG-MCG capsule capsule  Generic drug: polysacchar iron-FA-B12      Take 1 capsule by mouth Daily With Breakfast.       hydroxychloroquine 200 MG tablet  Commonly known as: PLAQUENIL      Take 200 mg by mouth Daily.       vitamin B-12 100 MCG tablet  Commonly known as: CYANOCOBALAMIN      Take 50 mcg by mouth Daily.              Patient is no longer taking -.  I corrected the med list to reflect this.  I did not stop these medications.      Dictated utilizing Dragon dictation

## 2022-03-16 ENCOUNTER — HOSPITAL ENCOUNTER (OUTPATIENT)
Dept: CARDIOLOGY | Facility: HOSPITAL | Age: 42
End: 2022-03-16

## 2022-03-16 ENCOUNTER — APPOINTMENT (OUTPATIENT)
Dept: CARDIOLOGY | Facility: HOSPITAL | Age: 42
End: 2022-03-16

## 2022-03-21 RX ORDER — APIXABAN 5 MG/1
TABLET, FILM COATED ORAL
Qty: 180 TABLET | Refills: 0 | Status: SHIPPED | OUTPATIENT
Start: 2022-03-21 | End: 2022-09-01 | Stop reason: SDUPTHER

## 2022-03-28 ENCOUNTER — TELEPHONE (OUTPATIENT)
Dept: CARDIOLOGY | Facility: CLINIC | Age: 42
End: 2022-03-28

## 2022-03-28 NOTE — TELEPHONE ENCOUNTER
Faxed compression stockings order to [facility] per pt request.  Info to pt to call in a few order to see if she needs appointment.  (Done)

## 2022-05-16 NOTE — TELEPHONE ENCOUNTER
Caller: VALERIANO    Relationship to patient: SELF    Best call back number: [phone]    Patient is needing: RN TO CALL AND LET INSURANCE KNOW PT HAD ADVERSE REACTION TO VENEOFER ON 10- AND NEEDS TO CHANGE TO INJECTAFER.     This document is complete and the patient is ready for discharge.

## 2022-05-20 ENCOUNTER — TELEPHONE (OUTPATIENT)
Dept: ONCOLOGY | Facility: CLINIC | Age: 42
End: 2022-05-20

## 2022-05-20 NOTE — TELEPHONE ENCOUNTER
Caller: Sharyn Jones    Relationship: Self    Best call back number: [phone]    What was the call regarding: PATIENT NEEDS TO SCHEDULE A FOLLOW UP, NEEDS EARLIER IN THE DAY. HUB UNABLE TO SCHEDULE, PATIENT IN ACTIVE TREATMENT.     Do you require a callback: YES TO ADVISE APPOINTMENT DATE AND TIME

## 2022-05-25 ENCOUNTER — LAB (OUTPATIENT)
Dept: OTHER | Facility: HOSPITAL | Age: 42
End: 2022-05-25

## 2022-05-25 DIAGNOSIS — E61.1 IRON DEFICIENCY: ICD-10-CM

## 2022-05-25 LAB
BASOPHILS # BLD AUTO: 0.04 10*3/MM3 (ref 0–0.2)
BASOPHILS NFR BLD AUTO: 0.7 % (ref 0–1.5)
DEPRECATED RDW RBC AUTO: 38.3 FL (ref 37–54)
EOSINOPHIL # BLD AUTO: 0.07 10*3/MM3 (ref 0–0.4)
EOSINOPHIL NFR BLD AUTO: 1.2 % (ref 0.3–6.2)
ERYTHROCYTE [DISTWIDTH] IN BLOOD BY AUTOMATED COUNT: 12.4 % (ref 12.3–15.4)
FERRITIN SERPL-MCNC: 14.1 NG/ML (ref 13–150)
HCT VFR BLD AUTO: 33.8 % (ref 34–46.6)
HGB BLD-MCNC: 11.2 G/DL (ref 12–15.9)
IMM GRANULOCYTES # BLD AUTO: 0.01 10*3/MM3 (ref 0–0.05)
IMM GRANULOCYTES NFR BLD AUTO: 0.2 % (ref 0–0.5)
IRON 24H UR-MRATE: 24 MCG/DL (ref 37–145)
IRON SATN MFR SERPL: 6 % (ref 20–50)
LYMPHOCYTES # BLD AUTO: 2.26 10*3/MM3 (ref 0.7–3.1)
LYMPHOCYTES NFR BLD AUTO: 38.6 % (ref 19.6–45.3)
MCH RBC QN AUTO: 28.4 PG (ref 26.6–33)
MCHC RBC AUTO-ENTMCNC: 33.1 G/DL (ref 31.5–35.7)
MCV RBC AUTO: 85.6 FL (ref 79–97)
MONOCYTES # BLD AUTO: 0.32 10*3/MM3 (ref 0.1–0.9)
MONOCYTES NFR BLD AUTO: 5.5 % (ref 5–12)
NEUTROPHILS NFR BLD AUTO: 3.16 10*3/MM3 (ref 1.7–7)
NEUTROPHILS NFR BLD AUTO: 53.8 % (ref 42.7–76)
NRBC BLD AUTO-RTO: 0 /100 WBC (ref 0–0.2)
PLATELET # BLD AUTO: 316 10*3/MM3 (ref 140–450)
PMV BLD AUTO: 9.5 FL (ref 6–12)
RBC # BLD AUTO: 3.95 10*6/MM3 (ref 3.77–5.28)
TIBC SERPL-MCNC: 374 MCG/DL (ref 298–536)
TRANSFERRIN SERPL-MCNC: 251 MG/DL (ref 200–360)
WBC NRBC COR # BLD: 5.86 10*3/MM3 (ref 3.4–10.8)

## 2022-05-25 PROCEDURE — 85025 COMPLETE CBC W/AUTO DIFF WBC: CPT | Performed by: INTERNAL MEDICINE

## 2022-05-25 PROCEDURE — 82728 ASSAY OF FERRITIN: CPT | Performed by: INTERNAL MEDICINE

## 2022-05-25 PROCEDURE — 83540 ASSAY OF IRON: CPT | Performed by: INTERNAL MEDICINE

## 2022-05-25 PROCEDURE — 84466 ASSAY OF TRANSFERRIN: CPT | Performed by: INTERNAL MEDICINE

## 2022-05-25 PROCEDURE — 36415 COLL VENOUS BLD VENIPUNCTURE: CPT

## 2022-06-02 ENCOUNTER — APPOINTMENT (OUTPATIENT)
Dept: OTHER | Facility: HOSPITAL | Age: 42
End: 2022-06-02

## 2022-06-02 ENCOUNTER — OFFICE VISIT (OUTPATIENT)
Dept: ONCOLOGY | Facility: CLINIC | Age: 42
End: 2022-06-02

## 2022-06-02 ENCOUNTER — APPOINTMENT (OUTPATIENT)
Dept: ONCOLOGY | Facility: HOSPITAL | Age: 42
End: 2022-06-02

## 2022-06-02 ENCOUNTER — APPOINTMENT (OUTPATIENT)
Dept: LAB | Facility: HOSPITAL | Age: 42
End: 2022-06-02

## 2022-06-02 VITALS
RESPIRATION RATE: 16 BRPM | BODY MASS INDEX: 24.57 KG/M2 | TEMPERATURE: 97.1 F | OXYGEN SATURATION: 98 % | SYSTOLIC BLOOD PRESSURE: 117 MMHG | HEART RATE: 90 BPM | WEIGHT: 143.9 LBS | HEIGHT: 64 IN | DIASTOLIC BLOOD PRESSURE: 76 MMHG

## 2022-06-02 DIAGNOSIS — T45.4X5S ADVERSE EFFECT OF IRON, SEQUELA: ICD-10-CM

## 2022-06-02 DIAGNOSIS — G45.9 TIA (TRANSIENT ISCHEMIC ATTACK): Primary | ICD-10-CM

## 2022-06-02 DIAGNOSIS — D50.9 IRON DEFICIENCY ANEMIA, UNSPECIFIED IRON DEFICIENCY ANEMIA TYPE: ICD-10-CM

## 2022-06-02 DIAGNOSIS — E53.8 B12 DEFICIENCY: ICD-10-CM

## 2022-06-02 DIAGNOSIS — D68.59 HYPERCOAGULATION SYNDROME: ICD-10-CM

## 2022-06-02 DIAGNOSIS — I63.9 CEREBROVASCULAR ACCIDENT (CVA), UNSPECIFIED MECHANISM: ICD-10-CM

## 2022-06-02 PROCEDURE — 99214 OFFICE O/P EST MOD 30 MIN: CPT | Performed by: INTERNAL MEDICINE

## 2022-06-02 RX ORDER — WARFARIN SODIUM 5 MG/1
TABLET ORAL
Qty: 60 TABLET | Refills: 3 | Status: SHIPPED | OUTPATIENT
Start: 2022-06-02 | End: 2022-09-01

## 2022-06-02 NOTE — PROGRESS NOTES
.   REASON FOR FOLLOW UP:     1.  Suspected episodes of strokes.   · Laboratory study on 5/31/2018 reported significantly elevated anti-phosphatidylserine IgM at 57, and marginally elevated anticardiolipin IgM 14.  All others were negative.    · Repeated laboratory study on 9/13/2018 showed persistently significantly elevated anti-phosphatidylserine IgM at 65.    2.  Anemia. Laboratory tests confirmed vitamin B12 deficiency and iron deficiency on 5/31/2018.    · She also has persistent significant iron deficiency and vitamin B12 deficiency on 9/13/2018.    · Patient was not able to tolerate oral iron due to significant constipation, she was given 2 doses Injectafer in end of September 2018, with significant improved iron panel and normalization of hemoglobin in December 2018.    · Patient had recurrent iron deficiency in June 2019 due to heavy menses.  Patient was given Injectafer weekly for 2 doses.  Her vitamin B12 level improved at 404 on 6/24/2019.     · Recurrent iron deficiency anemia, 2 doses of Injectafer in February 2021.    · Patient now taking vitamin B12 supplement with great tolerance.   3.  Vitamin D deficiency confirmed on 6/3/2021.         HISTORY OF PRESENT ILLNESS:  The patient is a 42 y.o. year old female with the above-mentioned history, presented for annual evaluation, after laboratory studies obtained last week on 5/25/2022.    Patient reports there is profound fatigue.  She also not use a wheelchair because she has worsening sensation and strength in her lower extremities.  She was previously on Plaquenil for lupus/multiple sclerosis, however that medicine causes clotting problem with elongation of QT interval, so Plaquenil was discontinued.    Patient also reports intermittent migraine headache, and also lost vision after that.  Patient continues Eliquis anticoagulation with good tolerance denies bleeding.     Patient continues have active menses, most recent time was light.     She was  taking oral iron once a day together with oral vitamin B12 once a day, however not for the past 2 months.  Patient reports oral iron causes abdomen cramping sometimes.      Laboratory studies 5/25/2022 reported recurrent iron deficiency with ferritin 14.1 ng/mL iron saturation 6% with free iron 24 TIBC 374, and also hemoglobin 11.2 with normal platelets 316,000 and a WBC 5860.        HEMATOLOGIC/ONCOLOGIC HISTORY:  The patient is a 38 y.o. year old female who is here for initial evaluation with the above-mentioned history on 5/31/2018.    Patient presented for evaluation reported by her primary care physician Dr. Bravo because of recurrent episodes of blurry visions.  Patient also has abnormal brain MRI examination on multiple occasions.  She was origin of diagnosis of multiple sclerosis, however was told by urologist Dr. Khanna that she did not have multiple sclerosis.  Most recently patient was seen by a different neurologist Dr. Scott that her MRI images did not fit with typical MS changes, instead it may be changes related to thrombotic stroke.  This patient has chronic migraine headache, and was taking Topamax low-dose with good results but with side effects and the medicine was recently changed to Trokendl XR.     This patient also has history of iron deficiency for which she has been taking oral iron for many years.  She also had vitamin B12 deficiency.  Her laboratory study on 11/20/2017 clearly reported iron deficiency with ferritin 8 ng/mL, and she was already mildly anemic with hemoglobin 11.3, and MCV 82.9. She had a normal WBC 6700 including neutrophils 3850 and lymphocytes 2190, monocytes 420.  Her platelets was 323,000.     Laboratory study on 3/23/2018 reported hemoglobin 11.6, MCV 83.1 MCHC 33.6.  Her WBC was 7900 including neutrophils 5240, lymphocytes 1980, monocytes 500.  Her platelets were 341,000.  Her iron studies reported free iron 106 TIBC 365 and iron saturation 29%, vitamin B12 at 242  pg/mL and vitamin D 20 ng/mL.  There was no ferritin level nor folic acid level.      Laboratory studies we obtained on 05/31/2018 showed significantly elevated antiphosphatidylserine antibody IgM subtype at 57, but negative for IgG and IgA subtype.  She is also marginally positive for anticardiolipin IgM at 14, but negative for IgG subtype.  She was tested negative for other hypercoagulable tests including anti-Beta-2 glycoprotein 1 antibodies, negative for lupus anticoagulant and normal antithrombin 103%, normal protein C activity 117%, normal protein S activity 81% with free protein S antigen 90%.  She is also negative for factor II mutation and factor V Leiden mutation.  We also checked her for comprehensive rheumatoid panel which is all negative as well as negative for the rheumatoid factor and direct WANG.  She also had iron deficiency, ferritin 12.5, iron saturation 12% 05/31/2018.     The patient presented on 6/14/2018 for reevaluation and to discuss laboratory results obtained on 05/31/2018 when I saw her for the 1st time for evaluation of possible thrombosis as well as persistent anemia.  The patient reports no changes in her clinical condition the past 6 days.  No recent episodes of blurry vision.  She is on low-dose aspirin 81 mg daily.  The patient also has been on low-dose oral iron 28 mg daily with good compliance.  She was not able to tolerate the large dose of ferrous sulfate at 65 mg tablets because of significant constipation.  The patient is not really compliant with oral vitamin B12 supplementation.    We started patient on Eliquis 5 mg twice a day on 6/14/2018.  This patient is likely having antiphospholipid antibody syndrome.    In September 2018, patient reports she has no recurrence of blurry vision, no headaches and dizziness since she started on Eliquis 5 mg twice a day in middle June 2018.  She feels more fit.  Her  also reports patient is more energetic in the past 3 months.   Patient reports no easy bleeding or bruising.  She does note somewhat increased volume of her menses.    Patient reports she was not able to tolerate even low-dose oral iron with significant constipation.  She also reports vitamin B12 causes palpitation.  So currently she is not taking oral iron or vitamin B-12.      Laboratory results on 9/13/2018 reported persistently elevated anti-phosphatidylserine IgM at 65, however resolution of mildly elevated anticardiolipin IgM antibody.  She has persistent iron deficiency with ferritin 8 ng/mL, iron saturation 7%, both of which actually worse than those levels on 5/31/2018.  Her B12 level also is low at 236 pg/mL.  She has worsening anemia with hemoglobin 11.2 but maintains normal WBC 6500 and platelets 292,000.     Patient had IV Injectafer at the end of September and early October.  She reports improved energy level.      Laboratory studies on 12/13/2018 reported normalized hemoglobin at 12.8, MCV 88.0, MCHC 33.4.  She maintains normal platelets at 262,000 and WBC 7670.  Her iron study showed significantly improved ferritin 206 ng/mL and iron saturation 18%.  Her vitamin B12 level 264 pg/mL.      On 6/24/2019, the patient complained of recent palpitations, and was seen by a cardiologist, who prescribed her metoprolol 25 mg, however she has not started it yet.      Her laboratory study on 6/24/2019 reported normal hemoglobin 12.4, MCV 87.7, normal platelets 296,000 and WBC 6090.  It also reported recurrent iron deficiency with a ferritin 36.8, and iron saturation 10% free iron 34 TIBC 326.  Slightly improved vitamin B12 level at 404 pg/mL.  she has recurrent iron deficiency, most likely due to her heavy menses.  Patient was not able to tolerate oral iron.     Patient was given Injectafer 2 doses in July 2019.     Patient was given 1 dose of Injectafer in October 2020 due to recurrent iron deficiency.    In December 2020, she reports she had a heavy menses which lasted  "approximately 18 days.  She did hold her anticoagulation with no improvement in her menses.  She questions her need for iron.  She has attempted to take oral iron though is intolerant with abdominal discomfort.    She reports she has developed progressive neurologic symptoms with a stutter and generalized weakness.  She does regularly follow-up with neurology.  The patient is quite adamant that her symptoms are related to her antiphospholipid syndrome.  She is requesting referral to rheumatology today.    She does continue on Eliquis 5 mg twice daily with good tolerance.  She reports she is still \"clotting\" with intermittent discomfort in her upper and lower extremities which she attributes to blood clots.  Presently, she has no extremity pain, swelling or erythema.  She does continue on B12 supplementation.    She has vitamin D deficiency on 6/3/2021.  Patient reports that she is currently taking 50 mcg vitamin D equipment 2000 units.  I advised patient to take 6000 units every day, with the equivalent of 42,000 units/week.  She will need this dosage for about 8 weeks.     Venous Doppler study was negative for DVT.  She will continue current Eliquis anticoagulation.    I saw her in June 2021 and she has multiple complaints, including left leg swelling, significant fatigue, cyanosis of fingers and toes, and body aches.  We obtained the laboratory studies for assessment and that she was found to having vitamin D deficiency, despite of taking oral vitamin D 2000 units for about 3 to 4 months.  She also had a negative venous Doppler study of the left leg.  We also obtained lab studies for cryoglobulins and cold agglutinin for assessment of cyanosis.      Her left leg venous Doppler study was negative for DVT.    I spoke with the patient after she received the vitamin D results, and recommended to increase oral vitamin D to 3 tablets a day equivalent to 6000 units a day.  Patient reports this increased dose of vitamin D " "causes chest discomfort and that wake up in the night.  She actually stopped taking vitamin D and feels better with resolution of symptoms.  But anyway she was recently evaluated by cardiology service, and currently carries a cardiac monitor and will finish exam tomorrow with a further follow-up with cardiology.     Lab study reported no evidence of hypothyroidism.  She has completed normal TSH, free T4 and free T3.  Liver study also negative for cold agglutinin and cryoglobulin.           Vitals:    06/02/22 1000   Temp: 97.1 °F (36.2 °C)   TempSrc: Temporal   Weight: 65.3 kg (143 lb 14.4 oz)   Height: 162 cm (63.78\")   PainSc:   8   PainLoc: Generalized  Comment: all over body pain     Current Status 6/2/2022   ECOG score 2     PHYSICAL EXAM:   GENERAL:  Well-developed, well-nourished  female in no acute distress.  Patient is sitting in wheelchair.    SKIN:  Warm, dry without rashes, purpura or petechiae.  HEENT:  Normocephalic.  Wearing mask.   LYMPHATICS:  No cervical, supraclavicular adenopathy.  CHEST: Normal respiratory effort.  Lungs clear to auscultation. Good airflow.  CARDIAC:  Regular rate and rhythm without murmurs. Normal S1,S2.  ABDOMEN:  Soft, nontender with no organomegaly or masses.  Bowel sounds normal.  EXTREMITIES:  No clubbing, cyanosis or edema.  NEUROLOGICAL: CN II-XII grossly intact.    PSYCHIATRIC:  Normal affect and mood.        RECENT LABS:  Lab Results   Component Value Date    WBC 5.86 05/25/2022    HGB 11.2 (L) 05/25/2022    HCT 33.8 (L) 05/25/2022    MCV 85.6 05/25/2022     05/25/2022     Lab Results   Component Value Date    IRON 24 (L) 05/25/2022    TIBC 374 05/25/2022    FERRITIN 14.10 05/25/2022   Iron saturation 6% on 5/25/2022.                       IMAGING STUDY:         Assessment & Plan      1.  Antiphospholipid antibody syndrome, with significantly anti-phosphatidylserine IgM antibody twice more than 3 months apart.    · This patient had symptoms with blurry " vision, bilateral, ? Cerebrovascular accident (CVA).  This patient has multiple major complaints, and was thought related to multiple sclerosis, however she was told by 2 different neurologists that the MRI changes did not fit with multiple sclerosis.  Instead it may be more of stroke.  This patient also has intermittent lower extremity edema although no previous evidence of venous thrombosis.   · May 2018 significantly elevated antiphosphatidylserine antibody IgM at 57.  We repeated laboratory study on 9/13/2018, she had persistently elevated anti-phosphatidylserine antibody IgM 65.  So she has antiphospholipid syndrome.    · She initiated Eliquis 5 mg twice daily mid June 2018.  This resulted in resolution of blurry vision and numbness of her hands  · Eliquis was then dose reduced to 2.5 mg twice daily due to heavy menses and recurrent iron deficiency  · April 2020, she resumed 5 mg twice daily due to chest tightness and intermittent lower extremity swelling.  Patient did not have confirmed progressive thrombosis at that time  · The patient feels quite adamantly presently that her progressive neurologic symptoms are related to antiphospholipid antibody syndrome and request referral to rheumatology.  This was facilitated in March 2021. She will continue on Eliquis 5 mg twice daily  · On 6/3/2021, patient reports she has left leg edema last week.  She feels slightly better today.  Her venous Doppler study was negative for DVT in the left leg on 6/3/2021.  She will continue Eliquis 5 mg twice a day.   · Today on 6/2/2022, patient reports she has persistent intermittent migraine headaches, and vision loss is.  She continues on Eliquis with good compliance.  I agree her symptoms most likely related to ongoing thrombosis.  I discussed with the patient, recommended to switch anticoagulation to Coumadin/warfarin.  We will monitor INR weekly.  This patient and his lifelong anticoagulation.  We will try to get insurance  permission for INR meter so she could measure herself at home.        2.  Iron deficiency anemia, secondary to heavy menses.    · Intolerant to oral iron with significant constipation  · We treated her with 2 doses of intravenous iron with Injectofar in end of September and early October 2018.   · She had IV iron in late June and early July 2019 because recurrent iron deficiency.    · She reports heavy menses.  She did discuss with GYN who recommended ablation, nonhormonal IUD or hysterectomy.  · Recurrent iron deficiency anemia with Hb 11.5, ferritin 16.9 and iron saturation 10% on 10/7/2020.  Patient was given Injectafer 1 dose in October 2020.  · Patient reports in December 2020 she had a heavy menses, one-time it lasted for 18 days despite holding anticoagulation.  Labs 12/29/2020 with recurrent iron deficiency, ferritin of 42, iron saturation of 9%, hemoglobin 10.6.  No IV iron infusion.  · Persistent worsening iron deficiency with ferritin 29, iron saturation 8% on 1/29/2021.  Patient had Injectafer 2 doses in February 2021  · Laboratory studies on 4/23/2021 reported much improved iron study with ferritin 400.7 ng/mL, iron saturation 24%, and improved hemoglobin 13.0.  · On 6/3/2021, patient maintains normal hemoglobin 13.0.  Iron study reported excellent ferritin 375.8 ng/mL and iron saturation 31% with free iron 82 TIBC 262.   · Laboratory study 5/25/2022 reported significant recurrent iron deficiency anemia with ferritin 14.1, iron saturation 6% free iron 24 and TIBC 374.  I recommended to restart intravenous iron therapy.  We will get insurance permission.  Based on insurance policy changes, insurance likely will only approve Venna for.  We will request Venofer 300 mg weekly for 3 doses.      3.  B12 deficiency.    · This patient has history of B12 deficiency and was given B12 injection previously.    · She continues on oral B12 at 1000 mcg daily with a B12 level at 916 pg/mL on 12/29/2020.   · Continue  oral vitamin B12.     4.  Progressive neurologic symptoms  · The patient reports a decline over the past 2 months with unexpected weight loss, lower extremity weakness, development of a stutter  · She is following up with neurology related to her multiple sclerosis, who does not feel her symptoms are related to her MS as her most recent MRI was fairly stable  · Patient question symptomatology related to antiphospholipid syndrome or other autoimmune etiology, she is requesting referral to rheumatology at this time.  She reports her neurologist is also recommended referral to rheumatology.  Patient was seen by rheumatologist and started on Plaquenil however was not able to tolerate it due to prolongation of QT interval.  Plaquenil was subsequently discontinued.    5.  Significant chronic fatigue.  · On 6/3/2021, patient reports she is worsening chronic fatigue, very significant, she has no energy at all, lies on bed or sitting in couch most the time.    · Patient reports she is not depressed at all.  · I recommended to obtain comprehensive thyroid profile for assessment.  Patient had a complete normal thyroid profile on 6/3/2021.   · On 6/2/2022 patient reports profound fatigue.  This is apparently related to her iron deficiency.  We will treat her with intravenous Venofer.    6.  Peripheral cyanosis.  · 6/3/2021, patient reports her fingers and toes will return to blue color oftentimes despite of taking Eliquis for anticoagulation.  She reports no claudication.    · By examination, her finger feels cool to touch.  No signs of ischemia.  · I recommended laboratory studies for cold agglutinin and cryoglobulin for assessment.  Laboratory study on 6/3/2021 reported negative results for both cold agglutinin and cryoglobulin.   · Symptoms also likely related to hypercoagulable condition.      PLAN:    1. We will get the permission for intravenous Venofer 300 mg weekly for 3 doses.    2. Start Coumadin 5 mg nightly.   Monitor INR weekly.  Coumadin dose will be adjusted per INR level.    3. Continue Eliquis 5 mg twice daily until INR becomes therapeutic.    4. Will obtain permission for INR meter for patient to use at home.    5. I will bring patient back for reevaluation in 3 months for reassessment, will check CBC, ferritin iron profile and iron.    6. Follow-up with the cardiology service Dr. Riggins.    7. Keep appointment for rheumatology evaluation.  8. Continue oral vitamin B12 daily.    9. Continue polysaccharide iron once a day.      Reviewed the laboratory results with the patient.  We also discussed that her clinical condition and discussed the management options see the details above.  Patient is agreeable.        Johny Burgos MD PhD   06/02/2022

## 2022-06-09 ENCOUNTER — INFUSION (OUTPATIENT)
Dept: ONCOLOGY | Facility: HOSPITAL | Age: 42
End: 2022-06-09

## 2022-06-09 ENCOUNTER — LAB (OUTPATIENT)
Dept: OTHER | Facility: HOSPITAL | Age: 42
End: 2022-06-09

## 2022-06-09 VITALS
SYSTOLIC BLOOD PRESSURE: 109 MMHG | DIASTOLIC BLOOD PRESSURE: 70 MMHG | RESPIRATION RATE: 18 BRPM | TEMPERATURE: 98.2 F | WEIGHT: 144.8 LBS | HEIGHT: 64 IN | BODY MASS INDEX: 24.72 KG/M2 | HEART RATE: 85 BPM | OXYGEN SATURATION: 100 %

## 2022-06-09 DIAGNOSIS — D68.59 HYPERCOAGULATION SYNDROME: ICD-10-CM

## 2022-06-09 DIAGNOSIS — D50.9 IRON DEFICIENCY ANEMIA, UNSPECIFIED IRON DEFICIENCY ANEMIA TYPE: ICD-10-CM

## 2022-06-09 DIAGNOSIS — T45.4X5A ADVERSE EFFECT OF IRON, INITIAL ENCOUNTER: Primary | ICD-10-CM

## 2022-06-09 DIAGNOSIS — G45.9 TIA (TRANSIENT ISCHEMIC ATTACK): ICD-10-CM

## 2022-06-09 PROCEDURE — 25010000002 IRON SUCROSE PER 1 MG: Performed by: INTERNAL MEDICINE

## 2022-06-09 PROCEDURE — 96365 THER/PROPH/DIAG IV INF INIT: CPT

## 2022-06-09 RX ORDER — SODIUM CHLORIDE 9 MG/ML
250 INJECTION, SOLUTION INTRAVENOUS ONCE
Status: COMPLETED | OUTPATIENT
Start: 2022-06-09 | End: 2022-06-09

## 2022-06-09 RX ORDER — DIPHENHYDRAMINE HCL 25 MG
25 CAPSULE ORAL ONCE
Status: DISCONTINUED | OUTPATIENT
Start: 2022-06-09 | End: 2022-06-09 | Stop reason: HOSPADM

## 2022-06-09 RX ORDER — ACETAMINOPHEN 325 MG/1
650 TABLET ORAL ONCE
Status: DISCONTINUED | OUTPATIENT
Start: 2022-06-09 | End: 2022-06-09 | Stop reason: HOSPADM

## 2022-06-09 RX ADMIN — IRON SUCROSE 300 MG: 20 INJECTION, SOLUTION INTRAVENOUS at 08:21

## 2022-06-09 RX ADMIN — SODIUM CHLORIDE 250 ML: 9 INJECTION, SOLUTION INTRAVENOUS at 08:21

## 2022-06-09 NOTE — NURSING NOTE
Patient here for INR.  INR not checked today because patient states that she has not started her Coumadin.  Patient states that she will start be starting her Coumadin and she is scheduled for INR next week.

## 2022-06-16 ENCOUNTER — LAB (OUTPATIENT)
Dept: OTHER | Facility: HOSPITAL | Age: 42
End: 2022-06-16

## 2022-06-16 ENCOUNTER — INFUSION (OUTPATIENT)
Dept: ONCOLOGY | Facility: HOSPITAL | Age: 42
End: 2022-06-16

## 2022-06-16 VITALS
WEIGHT: 146.4 LBS | RESPIRATION RATE: 18 BRPM | HEIGHT: 64 IN | SYSTOLIC BLOOD PRESSURE: 100 MMHG | OXYGEN SATURATION: 100 % | DIASTOLIC BLOOD PRESSURE: 65 MMHG | HEART RATE: 107 BPM | BODY MASS INDEX: 25 KG/M2 | TEMPERATURE: 96.9 F

## 2022-06-16 DIAGNOSIS — D50.9 IRON DEFICIENCY ANEMIA, UNSPECIFIED IRON DEFICIENCY ANEMIA TYPE: ICD-10-CM

## 2022-06-16 DIAGNOSIS — G45.9 TIA (TRANSIENT ISCHEMIC ATTACK): ICD-10-CM

## 2022-06-16 DIAGNOSIS — T45.4X5A ADVERSE EFFECT OF IRON, INITIAL ENCOUNTER: Primary | ICD-10-CM

## 2022-06-16 DIAGNOSIS — D68.59 HYPERCOAGULATION SYNDROME: ICD-10-CM

## 2022-06-16 LAB
INR PPP: 1
PROTHROMBIN TIME: 12.4 SECONDS (ref 11–15)

## 2022-06-16 PROCEDURE — 85610 PROTHROMBIN TIME: CPT

## 2022-06-16 PROCEDURE — 96365 THER/PROPH/DIAG IV INF INIT: CPT

## 2022-06-16 PROCEDURE — 96366 THER/PROPH/DIAG IV INF ADDON: CPT

## 2022-06-16 PROCEDURE — 25010000002 IRON SUCROSE PER 1 MG: Performed by: INTERNAL MEDICINE

## 2022-06-16 RX ORDER — SODIUM CHLORIDE 9 MG/ML
250 INJECTION, SOLUTION INTRAVENOUS ONCE
Status: COMPLETED | OUTPATIENT
Start: 2022-06-16 | End: 2022-06-16

## 2022-06-16 RX ADMIN — SODIUM CHLORIDE 250 ML: 9 INJECTION, SOLUTION INTRAVENOUS at 08:09

## 2022-06-16 RX ADMIN — IRON SUCROSE 300 MG: 20 INJECTION, SOLUTION INTRAVENOUS at 08:19

## 2022-06-16 NOTE — NURSING NOTE
"Lab Results   Component Value Date    INR 1.00 06/16/2022    INR 1.7 05/17/2021    INR 1.1 09/19/2019    PROTIME 12.4 06/16/2022    PROTIME 19.6 (H) 05/17/2021    PROTIME 13.1 09/19/2019     Results D/W Dr. Burgos as patient reports that she has not started the coumadin but continues with eliquis. States that she is waiting to hear either for approval or denial of home ethel as she is not able to make trips in due to lack of transportation to have labs monitored. Dr. Burgos states \"that is fine\". No adjustments made.    "

## 2022-06-23 ENCOUNTER — APPOINTMENT (OUTPATIENT)
Dept: ONCOLOGY | Facility: HOSPITAL | Age: 42
End: 2022-06-23

## 2022-06-23 ENCOUNTER — TELEPHONE (OUTPATIENT)
Dept: ONCOLOGY | Facility: CLINIC | Age: 42
End: 2022-06-23

## 2022-06-23 NOTE — TELEPHONE ENCOUNTER
Caller: Sharyn Jones    Relationship to patient: Self    Best call back number: [phone]    Chief complaint: CANC./NACHO., DUE TO NOT FEELING WELL!    Type of visit: INFUSION    If rescheduling, when is the original appointment: 6/23/2022    Additional notes: TRIED TO WT WITH NO ANSWER.

## 2022-06-29 ENCOUNTER — TELEPHONE (OUTPATIENT)
Dept: CARDIOLOGY | Facility: CLINIC | Age: 42
End: 2022-06-29

## 2022-06-29 ENCOUNTER — PATIENT MESSAGE (OUTPATIENT)
Dept: CARDIOLOGY | Facility: CLINIC | Age: 42
End: 2022-06-29

## 2022-06-30 ENCOUNTER — INFUSION (OUTPATIENT)
Dept: ONCOLOGY | Facility: HOSPITAL | Age: 42
End: 2022-06-30

## 2022-06-30 VITALS
TEMPERATURE: 97.5 F | HEART RATE: 81 BPM | WEIGHT: 145 LBS | HEIGHT: 64 IN | DIASTOLIC BLOOD PRESSURE: 64 MMHG | OXYGEN SATURATION: 98 % | SYSTOLIC BLOOD PRESSURE: 100 MMHG | BODY MASS INDEX: 24.75 KG/M2 | RESPIRATION RATE: 16 BRPM

## 2022-06-30 DIAGNOSIS — T45.4X5A ADVERSE EFFECT OF IRON, INITIAL ENCOUNTER: Primary | ICD-10-CM

## 2022-06-30 DIAGNOSIS — D50.9 IRON DEFICIENCY ANEMIA, UNSPECIFIED IRON DEFICIENCY ANEMIA TYPE: ICD-10-CM

## 2022-06-30 PROCEDURE — 25010000002 IRON SUCROSE PER 1 MG: Performed by: INTERNAL MEDICINE

## 2022-06-30 PROCEDURE — 96365 THER/PROPH/DIAG IV INF INIT: CPT

## 2022-06-30 PROCEDURE — 96361 HYDRATE IV INFUSION ADD-ON: CPT

## 2022-06-30 RX ORDER — SODIUM CHLORIDE 9 MG/ML
250 INJECTION, SOLUTION INTRAVENOUS ONCE
Status: COMPLETED | OUTPATIENT
Start: 2022-06-30 | End: 2022-06-30

## 2022-06-30 RX ORDER — SODIUM CHLORIDE 9 MG/ML
500 INJECTION, SOLUTION INTRAVENOUS ONCE
Status: COMPLETED | OUTPATIENT
Start: 2022-06-30 | End: 2022-06-30

## 2022-06-30 RX ADMIN — SODIUM CHLORIDE 250 ML: 9 INJECTION, SOLUTION INTRAVENOUS at 08:10

## 2022-06-30 RX ADMIN — SODIUM CHLORIDE 500 ML: 9 INJECTION, SOLUTION INTRAVENOUS at 08:39

## 2022-06-30 RX ADMIN — IRON SUCROSE 300 MG: 20 INJECTION, SOLUTION INTRAVENOUS at 08:39

## 2022-06-30 NOTE — NURSING NOTE
Patient here for Venofer.  Patient states that her BP's have been running in the 90s/60s and after her last infusion, her BP was 80s/40s that evening with dizziness/lightheadedness.  Reviewed symptoms with Alison Stern NP.  Per NP- 500cc NS today.  Reviewed plan with patient.  Advised patient to monitor BP this evening and to go to  if she becomes symptomatic again.  Patient v/u.    Patient declined tylenol and benadryl today prior to infusion.

## 2022-07-14 ENCOUNTER — LAB (OUTPATIENT)
Dept: OTHER | Facility: HOSPITAL | Age: 42
End: 2022-07-14

## 2022-07-14 ENCOUNTER — CLINICAL SUPPORT (OUTPATIENT)
Dept: ONCOLOGY | Facility: HOSPITAL | Age: 42
End: 2022-07-14

## 2022-07-14 DIAGNOSIS — G45.9 TIA (TRANSIENT ISCHEMIC ATTACK): ICD-10-CM

## 2022-07-14 DIAGNOSIS — D68.59 HYPERCOAGULATION SYNDROME: ICD-10-CM

## 2022-07-14 LAB
INR PPP: 1.1
PROTHROMBIN TIME: 13.7 SECONDS (ref 11–15)

## 2022-07-14 PROCEDURE — 85610 PROTHROMBIN TIME: CPT

## 2022-07-14 PROCEDURE — G0463 HOSPITAL OUTPT CLINIC VISIT: HCPCS

## 2022-07-14 NOTE — NURSING NOTE
Patient arrived today and still taking Eliquis at home waiting to see if it is possible to do home testing of PT and INR at home. No voice complaints and will check on insurance status of home checks . Patient virilized understanding..  Lab Results   Component Value Date    INR 1.10 07/14/2022    INR 1.00 06/16/2022    INR 1.7 05/17/2021    PROTIME 13.7 07/14/2022    PROTIME 12.4 06/16/2022    PROTIME 19.6 (H) 05/17/2021

## 2022-07-22 NOTE — TELEPHONE ENCOUNTER
Reviewed note.  Patient has numerous symptoms consistent with autonomic dysfunction.  Think it is best that she be seen in an autonomic dysfunction clinic such as [facility].  The degree of anemia with a hemoglobin of 11.2 should not be enough to cause chest pain.  Many reasons for chest pain other than cardiac etiologies but at this point I do think she needs a stress test.  If she does not wish to do the stress test and I at least recommend she check a resting echocardiogram.  We will place a referral to [facility] as I think it is best she see them given autoimmune component of her history as well as severity of symptoms.  Please see if she would like the referral

## 2022-07-25 ENCOUNTER — TELEPHONE (OUTPATIENT)
Dept: ONCOLOGY | Facility: CLINIC | Age: 42
End: 2022-07-25

## 2022-07-25 NOTE — TELEPHONE ENCOUNTER
Patient returned my call and is reporting for the past couple of days, she has been experiencing chest tightness with pain/tingling in her left arm.  She feels like it is difficult to breath and then she experiences a very sharp pain and then the problem is resolved.  Then the same thing happens again.  This has been repetitious over the weekend.  She does not think the Eliquis is preventing clots, since these are the same symptoms she has experienced in the past.  She does not want to go to the ER.  She is wanting to possibly be started on Coumadin and would like to know where the process to obtaining her a home testing machine for her Inr is at.  Please advise.

## 2022-07-25 NOTE — TELEPHONE ENCOUNTER
Pt thinks that Eliquis is not working well with her.  She thinks she is still having lots.  Wants to know if she has been approved for home monitoring device.

## 2022-07-25 NOTE — TELEPHONE ENCOUNTER
Attempted to call no answer left message.  Encouraged patient to go to ER to have chest pain and left arm pain/tingling checked out

## 2022-08-06 DIAGNOSIS — R55 VASOVAGAL SYMPTOM: Primary | ICD-10-CM

## 2022-08-09 ENCOUNTER — TELEPHONE (OUTPATIENT)
Dept: ONCOLOGY | Facility: CLINIC | Age: 42
End: 2022-08-09

## 2022-08-09 NOTE — TELEPHONE ENCOUNTER
Caller: Sharyn Jones    Relationship to patient: Self    Best call back number: [phone]    Chief complaint: HAS NOT STARTED ON WARFARIN YET WOULD LIKE TO CANCEL THIS APPOINTMENT, DOES NOT WISH TO RESCHEDULE AT THIS TIME     Type of visit: 08/11

## 2022-08-11 ENCOUNTER — APPOINTMENT (OUTPATIENT)
Dept: OTHER | Facility: HOSPITAL | Age: 42
End: 2022-08-11

## 2022-08-11 ENCOUNTER — APPOINTMENT (OUTPATIENT)
Dept: ONCOLOGY | Facility: HOSPITAL | Age: 42
End: 2022-08-11

## 2022-08-29 ENCOUNTER — TELEPHONE (OUTPATIENT)
Dept: CARDIOLOGY | Facility: CLINIC | Age: 42
End: 2022-08-29

## 2022-09-01 ENCOUNTER — LAB (OUTPATIENT)
Dept: OTHER | Facility: HOSPITAL | Age: 42
End: 2022-09-01

## 2022-09-01 ENCOUNTER — OFFICE VISIT (OUTPATIENT)
Dept: ONCOLOGY | Facility: CLINIC | Age: 42
End: 2022-09-01

## 2022-09-01 VITALS
BODY MASS INDEX: 24.41 KG/M2 | HEIGHT: 64 IN | RESPIRATION RATE: 16 BRPM | OXYGEN SATURATION: 99 % | HEART RATE: 82 BPM | WEIGHT: 143 LBS | DIASTOLIC BLOOD PRESSURE: 72 MMHG | SYSTOLIC BLOOD PRESSURE: 108 MMHG | TEMPERATURE: 97.1 F

## 2022-09-01 DIAGNOSIS — T45.4X5A ADVERSE EFFECT OF IRON, INITIAL ENCOUNTER: ICD-10-CM

## 2022-09-01 DIAGNOSIS — T45.4X5S ADVERSE EFFECT OF IRON, SEQUELA: ICD-10-CM

## 2022-09-01 DIAGNOSIS — D50.9 IRON DEFICIENCY ANEMIA, UNSPECIFIED IRON DEFICIENCY ANEMIA TYPE: ICD-10-CM

## 2022-09-01 DIAGNOSIS — D68.59 HYPERCOAGULATION SYNDROME: Primary | ICD-10-CM

## 2022-09-01 DIAGNOSIS — E53.8 B12 DEFICIENCY: ICD-10-CM

## 2022-09-01 DIAGNOSIS — G45.9 TIA (TRANSIENT ISCHEMIC ATTACK): ICD-10-CM

## 2022-09-01 DIAGNOSIS — D68.59 HYPERCOAGULATION SYNDROME: ICD-10-CM

## 2022-09-01 DIAGNOSIS — Z79.01 ANTICOAGULATION ADEQUATE: ICD-10-CM

## 2022-09-01 LAB
BASOPHILS # BLD AUTO: 0.03 10*3/MM3 (ref 0–0.2)
BASOPHILS NFR BLD AUTO: 0.4 % (ref 0–1.5)
DEPRECATED RDW RBC AUTO: 40.4 FL (ref 37–54)
EOSINOPHIL # BLD AUTO: 0.14 10*3/MM3 (ref 0–0.4)
EOSINOPHIL NFR BLD AUTO: 1.9 % (ref 0.3–6.2)
ERYTHROCYTE [DISTWIDTH] IN BLOOD BY AUTOMATED COUNT: 12.2 % (ref 12.3–15.4)
FERRITIN SERPL-MCNC: 90.5 NG/ML (ref 13–150)
HCT VFR BLD AUTO: 36.1 % (ref 34–46.6)
HGB BLD-MCNC: 11.7 G/DL (ref 12–15.9)
IMM GRANULOCYTES # BLD AUTO: 0.03 10*3/MM3 (ref 0–0.05)
IMM GRANULOCYTES NFR BLD AUTO: 0.4 % (ref 0–0.5)
IRON 24H UR-MRATE: 43 MCG/DL (ref 37–145)
IRON SATN MFR SERPL: 14 % (ref 20–50)
LYMPHOCYTES # BLD AUTO: 2.4 10*3/MM3 (ref 0.7–3.1)
LYMPHOCYTES NFR BLD AUTO: 33 % (ref 19.6–45.3)
MCH RBC QN AUTO: 29.3 PG (ref 26.6–33)
MCHC RBC AUTO-ENTMCNC: 32.4 G/DL (ref 31.5–35.7)
MCV RBC AUTO: 90.5 FL (ref 79–97)
MONOCYTES # BLD AUTO: 0.41 10*3/MM3 (ref 0.1–0.9)
MONOCYTES NFR BLD AUTO: 5.6 % (ref 5–12)
NEUTROPHILS NFR BLD AUTO: 4.26 10*3/MM3 (ref 1.7–7)
NEUTROPHILS NFR BLD AUTO: 58.7 % (ref 42.7–76)
NRBC BLD AUTO-RTO: 0 /100 WBC (ref 0–0.2)
PLATELET # BLD AUTO: 275 10*3/MM3 (ref 140–450)
PMV BLD AUTO: 9.3 FL (ref 6–12)
RBC # BLD AUTO: 3.99 10*6/MM3 (ref 3.77–5.28)
TIBC SERPL-MCNC: 301 MCG/DL (ref 298–536)
TRANSFERRIN SERPL-MCNC: 202 MG/DL (ref 200–360)
WBC NRBC COR # BLD: 7.27 10*3/MM3 (ref 3.4–10.8)

## 2022-09-01 PROCEDURE — 36415 COLL VENOUS BLD VENIPUNCTURE: CPT

## 2022-09-01 PROCEDURE — 85025 COMPLETE CBC W/AUTO DIFF WBC: CPT | Performed by: INTERNAL MEDICINE

## 2022-09-01 PROCEDURE — 83540 ASSAY OF IRON: CPT | Performed by: INTERNAL MEDICINE

## 2022-09-01 PROCEDURE — 82728 ASSAY OF FERRITIN: CPT | Performed by: INTERNAL MEDICINE

## 2022-09-01 PROCEDURE — 99215 OFFICE O/P EST HI 40 MIN: CPT | Performed by: INTERNAL MEDICINE

## 2022-09-01 PROCEDURE — 84466 ASSAY OF TRANSFERRIN: CPT | Performed by: INTERNAL MEDICINE

## 2022-09-01 NOTE — PROGRESS NOTES
.   REASON FOR FOLLOW UP:     1.  Suspected episodes of strokes.   · Laboratory study on 5/31/2018 reported significantly elevated anti-phosphatidylserine IgM at 57, and marginally elevated anticardiolipin IgM 14.  All others were negative.    · Repeated laboratory study on 9/13/2018 showed persistently significantly elevated anti-phosphatidylserine IgM at 65.    2.  Anemia. Laboratory tests confirmed vitamin B12 deficiency and iron deficiency on 5/31/2018.    · She also has persistent significant iron deficiency and vitamin B12 deficiency on 9/13/2018.    · Patient was not able to tolerate oral iron due to significant constipation, she was given 2 doses Injectafer in end of September 2018, with significant improved iron panel and normalization of hemoglobin in December 2018.    · Patient had recurrent iron deficiency in June 2019 due to heavy menses.  Patient was given Injectafer weekly for 2 doses.  Her vitamin B12 level improved at 404 on 6/24/2019.     · Recurrent iron deficiency anemia, 2 doses of Injectafer in February 2021.    · Patient now taking vitamin B12 supplement with great tolerance.   3.  Vitamin D deficiency confirmed on 6/3/2021.         HISTORY OF PRESENT ILLNESS:  The patient is a 42 y.o. year old female with the above-mentioned history, presented for 3-month reevaluation evaluation, of her iron deficiency anemia and anticoagulation.    Patient reports that she continues on Eliquis anticoagulation.  Her insurance company will not cover the INR meter until she would come to the clinic for monitoring INR for more than 6 months.  Patient reports she now uses wheelchair, and is not easy for her to come to the clinic frequently.  So she opted to continue Eliquis anticoagulation.     Patient reports she has heavy menses.  She continues full dose anticoagulant during active menses.  She reports previously cutting Eliquis to half dose during the time of her active menses, however she suffers more  problem, she reports distended veins on her forearms, and more significant chest pain.      Patient also complains of intermittent tachycardia, hypotension, and severe left-sided chest pain which for the last for about an hour sometimes several hours.  She reports frequent incident probably couple times a month.  Patient was seen by cardiologist Dr. Melissa Riggins, will refer the patient to [facility] cardiology service.  However patient reports her insurance company is not covering the cost she could not go to [facility] for assessment.      Previously Dr. Riggins thought the Plaquenil might cause the patient's cardiac symptoms, and the patient reports she no longer takes Plaquenil but is still having intermittent tachycardia.  Patient also discussed with her rheumatologist for refer the patient to [facility] for evaluation for rheumatoid disease, however her insurance company also declined that for the same reason.  She is only allowed to see physicians within the [facility].     This patient was found to having iron deficient anemia with ferritin 14.1 ng/mL, iron saturation 6% free iron 24 TIBC 374 and hemoglobin 11.2.  Patient was given 3 doses of Venofer total 900 mg.    Today on 9/1/2022 patient is slightly improved hemoglobin 11.7, normal platelets 275,000 WBC 7270 and neutrophils 4260.  Iron study reported low iron saturation 14% with free iron 43 TIBC 301 and improved ferritin 90.5 ng/mL.      Patient was not able to tolerate oral iron to 20 once a day because of abdomen cramping.  She continues oral vitamin B12 daily.    Laboratory studies 5/25/2022 reported recurrent iron deficiency with ferritin 14.1 ng/mL iron saturation 6% with free iron 24 TIBC 374, and also hemoglobin 11.2 with normal platelets 316,000 and WBC 5860.         HEMATOLOGIC/ONCOLOGIC HISTORY:  The patient is a 38 y.o. year old female who is here for initial evaluation with the above-mentioned  history on 5/31/2018.    Patient presented for evaluation reported by her primary care physician Dr. Bravo because of recurrent episodes of blurry visions.  Patient also has abnormal brain MRI examination on multiple occasions.  She was origin of diagnosis of multiple sclerosis, however was told by urologist Dr. Khanna that she did not have multiple sclerosis.  Most recently patient was seen by a different neurologist Dr. Scott that her MRI images did not fit with typical MS changes, instead it may be changes related to thrombotic stroke.  This patient has chronic migraine headache, and was taking Topamax low-dose with good results but with side effects and the medicine was recently changed to Trokendl XR.     This patient also has history of iron deficiency for which she has been taking oral iron for many years.  She also had vitamin B12 deficiency.  Her laboratory study on 11/20/2017 clearly reported iron deficiency with ferritin 8 ng/mL, and she was already mildly anemic with hemoglobin 11.3, and MCV 82.9. She had a normal WBC 6700 including neutrophils 3850 and lymphocytes 2190, monocytes 420.  Her platelets was 323,000.     Laboratory study on 3/23/2018 reported hemoglobin 11.6, MCV 83.1 MCHC 33.6.  Her WBC was 7900 including neutrophils 5240, lymphocytes 1980, monocytes 500.  Her platelets were 341,000.  Her iron studies reported free iron 106 TIBC 365 and iron saturation 29%, vitamin B12 at 242 pg/mL and vitamin D 20 ng/mL.  There was no ferritin level nor folic acid level.      Laboratory studies we obtained on 05/31/2018 showed significantly elevated antiphosphatidylserine antibody IgM subtype at 57, but negative for IgG and IgA subtype.  She is also marginally positive for anticardiolipin IgM at 14, but negative for IgG subtype.  She was tested negative for other hypercoagulable tests including anti-Beta-2 glycoprotein 1 antibodies, negative for lupus anticoagulant and normal antithrombin 103%, normal  protein C activity 117%, normal protein S activity 81% with free protein S antigen 90%.  She is also negative for factor II mutation and factor V Leiden mutation.  We also checked her for comprehensive rheumatoid panel which is all negative as well as negative for the rheumatoid factor and direct WANG.  She also had iron deficiency, ferritin 12.5, iron saturation 12% 05/31/2018.     The patient presented on 6/14/2018 for reevaluation and to discuss laboratory results obtained on 05/31/2018 when I saw her for the 1st time for evaluation of possible thrombosis as well as persistent anemia.  The patient reports no changes in her clinical condition the past 6 days.  No recent episodes of blurry vision.  She is on low-dose aspirin 81 mg daily.  The patient also has been on low-dose oral iron 28 mg daily with good compliance.  She was not able to tolerate the large dose of ferrous sulfate at 65 mg tablets because of significant constipation.  The patient is not really compliant with oral vitamin B12 supplementation.    We started patient on Eliquis 5 mg twice a day on 6/14/2018.  This patient is likely having antiphospholipid antibody syndrome.    In September 2018, patient reports she has no recurrence of blurry vision, no headaches and dizziness since she started on Eliquis 5 mg twice a day in middle June 2018.  She feels more fit.  Her  also reports patient is more energetic in the past 3 months.  Patient reports no easy bleeding or bruising.  She does note somewhat increased volume of her menses.    Patient reports she was not able to tolerate even low-dose oral iron with significant constipation.  She also reports vitamin B12 causes palpitation.  So currently she is not taking oral iron or vitamin B-12.      Laboratory results on 9/13/2018 reported persistently elevated anti-phosphatidylserine IgM at 65, however resolution of mildly elevated anticardiolipin IgM antibody.  She has persistent iron deficiency with  ferritin 8 ng/mL, iron saturation 7%, both of which actually worse than those levels on 5/31/2018.  Her B12 level also is low at 236 pg/mL.  She has worsening anemia with hemoglobin 11.2 but maintains normal WBC 6500 and platelets 292,000.     Patient had IV Injectafer at the end of September and early October.  She reports improved energy level.      Laboratory studies on 12/13/2018 reported normalized hemoglobin at 12.8, MCV 88.0, MCHC 33.4.  She maintains normal platelets at 262,000 and WBC 7670.  Her iron study showed significantly improved ferritin 206 ng/mL and iron saturation 18%.  Her vitamin B12 level 264 pg/mL.      On 6/24/2019, the patient complained of recent palpitations, and was seen by a cardiologist, who prescribed her metoprolol 25 mg, however she has not started it yet.      Her laboratory study on 6/24/2019 reported normal hemoglobin 12.4, MCV 87.7, normal platelets 296,000 and WBC 6090.  It also reported recurrent iron deficiency with a ferritin 36.8, and iron saturation 10% free iron 34 TIBC 326.  Slightly improved vitamin B12 level at 404 pg/mL.  she has recurrent iron deficiency, most likely due to her heavy menses.  Patient was not able to tolerate oral iron.     Patient was given Injectafer 2 doses in July 2019.     Patient was given 1 dose of Injectafer in October 2020 due to recurrent iron deficiency.    In December 2020, she reports she had a heavy menses which lasted approximately 18 days.  She did hold her anticoagulation with no improvement in her menses.  She questions her need for iron.  She has attempted to take oral iron though is intolerant with abdominal discomfort.    She reports she has developed progressive neurologic symptoms with a stutter and generalized weakness.  She does regularly follow-up with neurology.  The patient is quite adamant that her symptoms are related to her antiphospholipid syndrome.  She is requesting referral to rheumatology today.    She does continue  "on Eliquis 5 mg twice daily with good tolerance.  She reports she is still \"clotting\" with intermittent discomfort in her upper and lower extremities which she attributes to blood clots.  Presently, she has no extremity pain, swelling or erythema.  She does continue on B12 supplementation.    She has vitamin D deficiency on 6/3/2021.  Patient reports that she is currently taking 50 mcg vitamin D equipment 2000 units.  I advised patient to take 6000 units every day, with the equivalent of 42,000 units/week.  She will need this dosage for about 8 weeks.     Venous Doppler study was negative for DVT.  She will continue current Eliquis anticoagulation.    I saw her in June 2021 and she has multiple complaints, including left leg swelling, significant fatigue, cyanosis of fingers and toes, and body aches.  We obtained the laboratory studies for assessment and that she was found to having vitamin D deficiency, despite of taking oral vitamin D 2000 units for about 3 to 4 months.  She also had a negative venous Doppler study of the left leg.  We also obtained lab studies for cryoglobulins and cold agglutinin for assessment of cyanosis.      Her left leg venous Doppler study was negative for DVT.    I spoke with the patient after she received the vitamin D results, and recommended to increase oral vitamin D to 3 tablets a day equivalent to 6000 units a day.  Patient reports this increased dose of vitamin D causes chest discomfort and that wake up in the night.  She actually stopped taking vitamin D and feels better with resolution of symptoms.  But anyway she was recently evaluated by cardiology service, and currently carries a cardiac monitor and will finish exam tomorrow with a further follow-up with cardiology.     Lab study reported no evidence of hypothyroidism.  She has completed normal TSH, free T4 and free T3.  Liver study also negative for cold agglutinin and cryoglobulin.           Vitals:    09/01/22 0817   BP: " "108/72   Pulse: 82   Resp: 16   Temp: 97.1 °F (36.2 °C)   TempSrc: Temporal   SpO2: 99%   Weight: 64.9 kg (143 lb)   Height: 162.6 cm (64.02\")   PainSc:   4   PainLoc: Back  Comment: starts at upper left shoulder     Current Status 9/1/2022   ECOG score 3     PHYSICAL EXAM:   GENERAL:  Well-developed, well-nourished female sitting in wheelchair, in no acute distress.  Orientated to time place and the people.  SKIN:  Warm, dry without rashes, purpura or petechiae.  HEENT:  Normocephalic.  Wearing mask.   LYMPHATICS:  No cervical, supraclavicular adenopathy.  CHEST: Normal respiratory effort.  Lungs clear to auscultation. Good airflow.  CARDIAC:  Regular rate and rhythm without murmurs. Normal S1,S2.  ABDOMEN:  Soft, nontender with no organomegaly or masses.  Bowel sounds normal.  EXTREMITIES:  No clubbing, cyanosis or edema.   PSYCHIATRIC:  Normal affect and mood.      RECENT LABS:     Lab Results   Component Value Date    IRON 43 09/01/2022    TIBC 301 09/01/2022    FERRITIN 90.50 09/01/2022   Iron saturation 14% on 9/1/2022, 6% on 5/25/2022.         IMAGING STUDY:         Assessment & Plan      1.  Antiphospholipid antibody syndrome, with significantly anti-phosphatidylserine IgM antibody twice more than 3 months apart.    · This patient had symptoms with blurry vision, bilateral, ? Cerebrovascular accident (CVA).  This patient has multiple major complaints, and was thought related to multiple sclerosis, however she was told by 2 different neurologists that the MRI changes did not fit with multiple sclerosis.  Instead it may be more of stroke.  This patient also has intermittent lower extremity edema although no previous evidence of venous thrombosis.   · May 2018 significantly elevated antiphosphatidylserine antibody IgM at 57.  We repeated laboratory study on 9/13/2018, she had persistently elevated anti-phosphatidylserine antibody IgM 65.  So she has antiphospholipid syndrome.    · She initiated Eliquis 5 mg " twice daily mid June 2018.  This resulted in resolution of blurry vision and numbness of her hands  · Eliquis was then dose reduced to 2.5 mg twice daily due to heavy menses and recurrent iron deficiency  · April 2020, she resumed 5 mg twice daily due to chest tightness and intermittent lower extremity swelling.  Patient did not have confirmed progressive thrombosis at that time  · The patient feels quite adamantly presently that her progressive neurologic symptoms are related to antiphospholipid antibody syndrome and request referral to rheumatology.  This was facilitated in March 2021. She will continue on Eliquis 5 mg twice daily  · On 6/3/2021, patient reports she has left leg edema last week.  She feels slightly better today.  Her venous Doppler study was negative for DVT in the left leg on 6/3/2021.  She will continue Eliquis 5 mg twice a day.   · On 6/2/2022, patient reports she has persistent intermittent migraine headaches, and vision loss is.  She continues on Eliquis with good compliance.  I agree her symptoms most likely related to ongoing thrombosis.  I discussed with the patient, recommended to switch anticoagulation to Coumadin/warfarin.  We will monitor INR weekly.  This patient and his lifelong anticoagulation.  We will try to get insurance permission for INR meter so she could measure herself at home.    · Patient reports that she continues on Eliquis anticoagulation.  Her insurance company will not cover the INR meter until she would come to the clinic for monitoring INR for more than 6 months.  Patient reports she now uses wheelchair, and is not easy for her to come to the clinic frequently.  So she opted to continue Eliquis anticoagulation.        2.  Iron deficiency anemia, secondary to heavy menses.    · Intolerant to oral iron with significant constipation  · We treated her with 2 doses of intravenous iron with Injectofar in end of September and early October 2018.   · She had IV iron in late  June and early July 2019 because recurrent iron deficiency.    · She reports heavy menses.  She did discuss with GYN who recommended ablation, nonhormonal IUD or hysterectomy.  · Recurrent iron deficiency anemia with Hb 11.5, ferritin 16.9 and iron saturation 10% on 10/7/2020.  Patient was given Injectafer 1 dose in October 2020.  · Patient reports in December 2020 she had a heavy menses, one-time it lasted for 18 days despite holding anticoagulation.  Labs 12/29/2020 with recurrent iron deficiency, ferritin of 42, iron saturation of 9%, hemoglobin 10.6.  No IV iron infusion.  · Persistent worsening iron deficiency with ferritin 29, iron saturation 8% on 1/29/2021.  Patient had Injectafer 2 doses in February 2021  · Laboratory studies on 4/23/2021 reported much improved iron study with ferritin 400.7 ng/mL, iron saturation 24%, and improved hemoglobin 13.0.  · On 6/3/2021, patient maintains normal hemoglobin 13.0.  Iron study reported excellent ferritin 375.8 ng/mL and iron saturation 31% with free iron 82 TIBC 262.   · Laboratory study 5/25/2022 reported significant recurrent iron deficiency anemia with ferritin 14.1, iron saturation 6% free iron 24 and TIBC 374. We will request Venofer 300 mg weekly for 3 doses.   · Today on 9/1/2022 patient is improved ferritin 90.5 ng/mL, however still low iron saturation 14% free iron 43 TIBC 301.  She also has persistent mild anemia with Hb 11.7.  I recommended patient to be given IV iron for 300 mg for 3 doses.      3.  B12 deficiency.    · This patient has history of B12 deficiency and was given B12 injection previously.    · She continues on oral B12 at 1000 mcg daily with B12 level at 916 pg/mL on 12/29/2020.   · Continue oral vitamin B12.     4.  Progressive neurologic symptoms  · The patient reports a decline over the past 2 months with unexpected weight loss, lower extremity weakness, development of a stutter  · She is following up with neurology related to her multiple  sclerosis, who does not feel her symptoms are related to her MS as her most recent MRI was fairly stable  · Patient question symptomatology related to antiphospholipid syndrome or other autoimmune etiology, she is requesting referral to rheumatology at this time.  She reports her neurologist is also recommended referral to rheumatology.  Patient was seen by rheumatologist and started on Plaquenil however was not able to tolerate it due to prolongation of QT interval.  Plaquenil was subsequently discontinued.   · Rheumatologist recommended further evaluation at the [facility].  However her insurance company declined coverage.    5.  Significant chronic fatigue.  · On 6/3/2021, patient reports she is worsening chronic fatigue, very significant, she has no energy at all, lies on bed or sitting in couch most the time.    · Patient reports she is not depressed at all.  · I recommended to obtain comprehensive thyroid profile for assessment.  Patient had a complete normal thyroid profile on 6/3/2021.   · On 6/2/2022 patient reports profound fatigue.  This is apparently related to her iron deficiency.  We will treat her with intravenous Venofer.    6.  Peripheral cyanosis.  · 6/3/2021, patient reports her fingers and toes will return to blue color oftentimes despite of taking Eliquis for anticoagulation.  She reports no claudication.    · By examination, her finger feels cool to touch.  No signs of ischemia.  · I recommended laboratory studies for cold agglutinin and cryoglobulin for assessment.  Laboratory study on 6/3/2021 reported negative results for both cold agglutinin and cryoglobulin.   · Symptoms also likely related to hypercoagulable condition.  No changes of her condition as of 9/1/2022.      PLAN:    1. We will arrange intravenous Venofer 300 mg weekly for 3 doses.    2. Continue Eliquis 5 mg every 12 hours.  .    3. I advised the patient to be checked in the ER if she has recurrent severe left-sided chest  pain.  4. Repeat laboratory studies every 3 months CBC, ferritin iron profile.    5. Follow-up with the cardiology service Dr. Riggins.    6. Follow-up with rheumatology evaluation.  7. Continue oral vitamin B12 daily.    8. Discontinue polysaccharide iron once a day due to poor tolerance.    9. We will bring patient back for follow-up with me in 6 months, will repeat laboratory studies CBC, ferritin and iron profile    Reviewed the laboratory results with the patient.  We also discussed that her clinical condition and discussed the management.  Patient is agreeable.    More than 40 min were used for patient care, over half of that time were for counseling.      Johny Burgos MD PhD   09/01/2022          CC:   Deborah Riggins M.D.    Melchor Roque M.D.

## 2022-09-15 ENCOUNTER — APPOINTMENT (OUTPATIENT)
Dept: ONCOLOGY | Facility: HOSPITAL | Age: 42
End: 2022-09-15

## 2022-09-22 ENCOUNTER — APPOINTMENT (OUTPATIENT)
Dept: ONCOLOGY | Facility: HOSPITAL | Age: 42
End: 2022-09-22

## 2022-09-29 ENCOUNTER — APPOINTMENT (OUTPATIENT)
Dept: ONCOLOGY | Facility: HOSPITAL | Age: 42
End: 2022-09-29

## 2022-11-07 ENCOUNTER — TELEPHONE (OUTPATIENT)
Dept: ONCOLOGY | Facility: CLINIC | Age: 42
End: 2022-11-07

## 2022-11-07 DIAGNOSIS — R07.2 PRECORDIAL PAIN: ICD-10-CM

## 2022-11-07 DIAGNOSIS — D68.59 HYPERCOAGULATION SYNDROME: Primary | ICD-10-CM

## 2022-11-07 NOTE — TELEPHONE ENCOUNTER
Returned call to patient with the information that Dr. Burgos is ordering a CTA for tomorrow and the D dimer will be added to her lab tests.

## 2022-11-07 NOTE — TELEPHONE ENCOUNTER
"Returned call to patient who is reporting that she has been having a flare up that is causing her to \"throw clots\".  She reports that she is experiencing pressure sensation in her left lung that keeps increasing, then resolves and then occurs again.  She has noticed this primarily at night.  She is also reporting some bulging in her neck veins.  She has looked this information up and suggests that a D-Dimer be added on to her lab work for tomorrow.  I reviewed this information with Dr. Burgos and approval to perform D-Dimer with her other lab work tomorrow.  He also is ordering a CTA to be done tomorrow as well, since patient is reporting \"pressure in her lung\".    "

## 2022-11-08 ENCOUNTER — TELEPHONE (OUTPATIENT)
Dept: ONCOLOGY | Facility: CLINIC | Age: 42
End: 2022-11-08

## 2022-11-08 ENCOUNTER — APPOINTMENT (OUTPATIENT)
Dept: OTHER | Facility: HOSPITAL | Age: 42
End: 2022-11-08

## 2022-11-08 ENCOUNTER — INFUSION (OUTPATIENT)
Dept: ONCOLOGY | Facility: HOSPITAL | Age: 42
End: 2022-11-08

## 2022-11-08 ENCOUNTER — APPOINTMENT (OUTPATIENT)
Dept: CT IMAGING | Facility: HOSPITAL | Age: 42
End: 2022-11-08

## 2022-11-08 ENCOUNTER — LAB (OUTPATIENT)
Dept: OTHER | Facility: HOSPITAL | Age: 42
End: 2022-11-08

## 2022-11-08 ENCOUNTER — APPOINTMENT (OUTPATIENT)
Dept: ONCOLOGY | Facility: HOSPITAL | Age: 42
End: 2022-11-08

## 2022-11-08 DIAGNOSIS — D68.59 HYPERCOAGULATION SYNDROME: ICD-10-CM

## 2022-11-08 DIAGNOSIS — D50.9 IRON DEFICIENCY ANEMIA, UNSPECIFIED IRON DEFICIENCY ANEMIA TYPE: ICD-10-CM

## 2022-11-08 DIAGNOSIS — R07.2 PRECORDIAL PAIN: ICD-10-CM

## 2022-11-08 DIAGNOSIS — T45.4X5A ADVERSE EFFECT OF IRON, INITIAL ENCOUNTER: ICD-10-CM

## 2022-11-08 DIAGNOSIS — G45.9 TIA (TRANSIENT ISCHEMIC ATTACK): ICD-10-CM

## 2022-11-08 LAB
BASOPHILS # BLD AUTO: 0.03 10*3/MM3 (ref 0–0.2)
BASOPHILS NFR BLD AUTO: 0.5 % (ref 0–1.5)
D DIMER PPP FEU-MCNC: 0.63 MCGFEU/ML (ref 0–0.49)
DEPRECATED RDW RBC AUTO: 39.1 FL (ref 37–54)
EOSINOPHIL # BLD AUTO: 0.12 10*3/MM3 (ref 0–0.4)
EOSINOPHIL NFR BLD AUTO: 2.1 % (ref 0.3–6.2)
ERYTHROCYTE [DISTWIDTH] IN BLOOD BY AUTOMATED COUNT: 11.9 % (ref 12.3–15.4)
FERRITIN SERPL-MCNC: 27 NG/ML (ref 13–150)
HCT VFR BLD AUTO: 36.6 % (ref 34–46.6)
HGB BLD-MCNC: 11.8 G/DL (ref 12–15.9)
IMM GRANULOCYTES # BLD AUTO: 0.02 10*3/MM3 (ref 0–0.05)
IMM GRANULOCYTES NFR BLD AUTO: 0.4 % (ref 0–0.5)
INR PPP: 1.2
IRON 24H UR-MRATE: 66 MCG/DL (ref 37–145)
IRON SATN MFR SERPL: 19 % (ref 20–50)
LYMPHOCYTES # BLD AUTO: 1.82 10*3/MM3 (ref 0.7–3.1)
LYMPHOCYTES NFR BLD AUTO: 32.3 % (ref 19.6–45.3)
MCH RBC QN AUTO: 28.6 PG (ref 26.6–33)
MCHC RBC AUTO-ENTMCNC: 32.2 G/DL (ref 31.5–35.7)
MCV RBC AUTO: 88.6 FL (ref 79–97)
MONOCYTES # BLD AUTO: 0.32 10*3/MM3 (ref 0.1–0.9)
MONOCYTES NFR BLD AUTO: 5.7 % (ref 5–12)
NEUTROPHILS NFR BLD AUTO: 3.32 10*3/MM3 (ref 1.7–7)
NEUTROPHILS NFR BLD AUTO: 59 % (ref 42.7–76)
NRBC BLD AUTO-RTO: 0 /100 WBC (ref 0–0.2)
PLATELET # BLD AUTO: 300 10*3/MM3 (ref 140–450)
PMV BLD AUTO: 9.1 FL (ref 6–12)
PROTHROMBIN TIME: 14.2 SECONDS (ref 11–15)
RBC # BLD AUTO: 4.13 10*6/MM3 (ref 3.77–5.28)
TIBC SERPL-MCNC: 340 MCG/DL (ref 298–536)
TRANSFERRIN SERPL-MCNC: 228 MG/DL (ref 200–360)
WBC NRBC COR # BLD: 5.63 10*3/MM3 (ref 3.4–10.8)

## 2022-11-08 PROCEDURE — 85379 FIBRIN DEGRADATION QUANT: CPT | Performed by: INTERNAL MEDICINE

## 2022-11-08 PROCEDURE — G0463 HOSPITAL OUTPT CLINIC VISIT: HCPCS

## 2022-11-08 PROCEDURE — 83540 ASSAY OF IRON: CPT | Performed by: INTERNAL MEDICINE

## 2022-11-08 PROCEDURE — 84466 ASSAY OF TRANSFERRIN: CPT | Performed by: INTERNAL MEDICINE

## 2022-11-08 PROCEDURE — 85025 COMPLETE CBC W/AUTO DIFF WBC: CPT | Performed by: INTERNAL MEDICINE

## 2022-11-08 PROCEDURE — 85610 PROTHROMBIN TIME: CPT

## 2022-11-08 PROCEDURE — 36415 COLL VENOUS BLD VENIPUNCTURE: CPT

## 2022-11-08 PROCEDURE — 82728 ASSAY OF FERRITIN: CPT | Performed by: INTERNAL MEDICINE

## 2022-11-08 NOTE — NURSING NOTE
Patient is here for lab review with RN.  CBC, and irons labs reviewed, per protocol pt will qualify for iron infusions. Patient has no complaints states that she is taking iron pills. Spoke with [facility] RN for Dr Burgos and she will place the plan for iron replacement and to schedule pt for iron infusion next week pending approval from insurance. Pt asked to be called with appts for iron infusion. Message sent to scheduling to arrange for infusions to start next week if possible. Copy of labs given to patient and follow up appointment reviewed. Patient is instructed to call the office with any concerns or new symptoms prior to next visit. Patient verbalized understanding and discharged in stable condition.    Lab Results   Component Value Date    WBC 5.63 11/08/2022    HGB 11.8 (L) 11/08/2022    HCT 36.6 11/08/2022    MCV 88.6 11/08/2022     11/08/2022       Lab Results   Component Value Date    IRON 66 11/08/2022    TIBC 340 11/08/2022    FERRITIN 27.00 11/08/2022

## 2022-11-08 NOTE — TELEPHONE ENCOUNTER
Patient called due to she did not show up for CTA. Patient stated she told the RN who reviewed her labs this morning she could not keep appointment. Will speak with Dr Burgos about rescheduling

## 2022-11-15 ENCOUNTER — TELEPHONE (OUTPATIENT)
Dept: ONCOLOGY | Facility: CLINIC | Age: 42
End: 2022-11-15

## 2022-11-15 ENCOUNTER — APPOINTMENT (OUTPATIENT)
Dept: ONCOLOGY | Facility: HOSPITAL | Age: 42
End: 2022-11-15

## 2022-11-15 NOTE — TELEPHONE ENCOUNTER
----- Message from Melissa Gandara sent at 11/15/2022  9:10 AM EST -----  Regarding: PT CANCELLED VENOFER FOR TODAY AND NOT SURE IF SHE CAN DO OTHERS  Good morning, Ms Jones just cancelled her Venofer for today.  She states she cannot do anything earlier than 4 due to her husbands work.  She is asking about going to the hospital for these?  Which they would not do then either but maybe weekends?  [phone]  Thank you Clemencia

## 2022-11-15 NOTE — TELEPHONE ENCOUNTER
Patient called and cancelled today's Venofer appointment. Per patient due to she cannot drive she would need appointments after 4 pm due to her 's work schedule. Called and explained to patient the Venofer appointments cannot be scheduled that late in the [facility] area and patient cannot be scheduled at the hospital for Venofer due to insurance approval and the hospital cannot give Venofer after 4 pm. Informed patient another option would be IV INFED and the scheduling and administration of INFED was explained to patient. Patient stated she would think about the INFED. Patient also stated her insurance was changing and she would call back with new insurance information.

## 2023-02-24 ENCOUNTER — TELEPHONE (OUTPATIENT)
Dept: ONCOLOGY | Facility: CLINIC | Age: 43
End: 2023-02-24
Payer: COMMERCIAL

## 2023-03-01 ENCOUNTER — TELEPHONE (OUTPATIENT)
Dept: ONCOLOGY | Facility: CLINIC | Age: 43
End: 2023-03-01
Payer: COMMERCIAL

## 2023-03-02 ENCOUNTER — OFFICE VISIT (OUTPATIENT)
Dept: ONCOLOGY | Facility: CLINIC | Age: 43
End: 2023-03-02
Payer: COMMERCIAL

## 2023-03-02 ENCOUNTER — LAB (OUTPATIENT)
Dept: OTHER | Facility: HOSPITAL | Age: 43
End: 2023-03-02
Payer: COMMERCIAL

## 2023-03-02 VITALS
DIASTOLIC BLOOD PRESSURE: 75 MMHG | TEMPERATURE: 98.4 F | BODY MASS INDEX: 26.46 KG/M2 | WEIGHT: 155 LBS | RESPIRATION RATE: 16 BRPM | SYSTOLIC BLOOD PRESSURE: 106 MMHG | HEIGHT: 64 IN | HEART RATE: 87 BPM | OXYGEN SATURATION: 100 %

## 2023-03-02 DIAGNOSIS — T45.4X5A ADVERSE EFFECT OF IRON, INITIAL ENCOUNTER: ICD-10-CM

## 2023-03-02 DIAGNOSIS — Z79.01 ANTICOAGULATION ADEQUATE: ICD-10-CM

## 2023-03-02 DIAGNOSIS — D50.9 IRON DEFICIENCY ANEMIA, UNSPECIFIED IRON DEFICIENCY ANEMIA TYPE: ICD-10-CM

## 2023-03-02 DIAGNOSIS — D68.59 HYPERCOAGULATION SYNDROME: ICD-10-CM

## 2023-03-02 DIAGNOSIS — D50.9 IRON DEFICIENCY ANEMIA, UNSPECIFIED IRON DEFICIENCY ANEMIA TYPE: Primary | ICD-10-CM

## 2023-03-02 DIAGNOSIS — G45.9 TIA (TRANSIENT ISCHEMIC ATTACK): ICD-10-CM

## 2023-03-02 DIAGNOSIS — E53.8 B12 DEFICIENCY: ICD-10-CM

## 2023-03-02 LAB
BASOPHILS # BLD AUTO: 0.04 10*3/MM3 (ref 0–0.2)
BASOPHILS NFR BLD AUTO: 0.6 % (ref 0–1.5)
DEPRECATED RDW RBC AUTO: 40 FL (ref 37–54)
EOSINOPHIL # BLD AUTO: 0.17 10*3/MM3 (ref 0–0.4)
EOSINOPHIL NFR BLD AUTO: 2.6 % (ref 0.3–6.2)
ERYTHROCYTE [DISTWIDTH] IN BLOOD BY AUTOMATED COUNT: 12.6 % (ref 12.3–15.4)
FERRITIN SERPL-MCNC: 6.9 NG/ML (ref 13–150)
HCT VFR BLD AUTO: 34 % (ref 34–46.6)
HGB BLD-MCNC: 10.6 G/DL (ref 12–15.9)
IMM GRANULOCYTES # BLD AUTO: 0.01 10*3/MM3 (ref 0–0.05)
IMM GRANULOCYTES NFR BLD AUTO: 0.2 % (ref 0–0.5)
IRON 24H UR-MRATE: 24 MCG/DL (ref 37–145)
IRON SATN MFR SERPL: 6 % (ref 20–50)
LYMPHOCYTES # BLD AUTO: 2.14 10*3/MM3 (ref 0.7–3.1)
LYMPHOCYTES NFR BLD AUTO: 32.9 % (ref 19.6–45.3)
MCH RBC QN AUTO: 27 PG (ref 26.6–33)
MCHC RBC AUTO-ENTMCNC: 31.2 G/DL (ref 31.5–35.7)
MCV RBC AUTO: 86.7 FL (ref 79–97)
MONOCYTES # BLD AUTO: 0.39 10*3/MM3 (ref 0.1–0.9)
MONOCYTES NFR BLD AUTO: 6 % (ref 5–12)
NEUTROPHILS NFR BLD AUTO: 3.76 10*3/MM3 (ref 1.7–7)
NEUTROPHILS NFR BLD AUTO: 57.7 % (ref 42.7–76)
NRBC BLD AUTO-RTO: 0 /100 WBC (ref 0–0.2)
PLATELET # BLD AUTO: 311 10*3/MM3 (ref 140–450)
PMV BLD AUTO: 8.6 FL (ref 6–12)
RBC # BLD AUTO: 3.92 10*6/MM3 (ref 3.77–5.28)
TIBC SERPL-MCNC: 419 MCG/DL (ref 298–536)
TRANSFERRIN SERPL-MCNC: 281 MG/DL (ref 200–360)
WBC NRBC COR # BLD: 6.51 10*3/MM3 (ref 3.4–10.8)

## 2023-03-02 PROCEDURE — 83540 ASSAY OF IRON: CPT | Performed by: INTERNAL MEDICINE

## 2023-03-02 PROCEDURE — 82728 ASSAY OF FERRITIN: CPT | Performed by: INTERNAL MEDICINE

## 2023-03-02 PROCEDURE — 84466 ASSAY OF TRANSFERRIN: CPT | Performed by: INTERNAL MEDICINE

## 2023-03-02 PROCEDURE — 85025 COMPLETE CBC W/AUTO DIFF WBC: CPT | Performed by: INTERNAL MEDICINE

## 2023-03-02 PROCEDURE — 99214 OFFICE O/P EST MOD 30 MIN: CPT | Performed by: INTERNAL MEDICINE

## 2023-03-02 PROCEDURE — 36415 COLL VENOUS BLD VENIPUNCTURE: CPT

## 2023-03-02 NOTE — PROGRESS NOTES
"    .   REASON FOR FOLLOW UP:     1.  Suspected episodes of strokes.   · Laboratory study on 5/31/2018 reported significantly elevated anti-phosphatidylserine IgM at 57, and marginally elevated anticardiolipin IgM 14.  All others were negative.    · Repeated laboratory study on 9/13/2018 showed persistently significantly elevated anti-phosphatidylserine IgM at 65.    2.  Anemia. Laboratory tests confirmed vitamin B12 deficiency and iron deficiency on 5/31/2018.    · She also has persistent significant iron deficiency and vitamin B12 deficiency on 9/13/2018.    · Patient was not able to tolerate oral iron due to significant constipation, she was given 2 doses Injectafer in end of September 2018, with significant improved iron panel and normalization of hemoglobin in December 2018.    · Patient had recurrent iron deficiency in June 2019 due to heavy menses.  Patient was given Injectafer weekly for 2 doses.  Her vitamin B12 level improved at 404 on 6/24/2019.     · Recurrent iron deficiency anemia, 2 doses of Injectafer in February 2021.    · Patient now taking vitamin B12 supplement with great tolerance.   3.  Vitamin D deficiency confirmed on 6/3/2021.         HISTORY OF PRESENT ILLNESS:  The patient is a 43 y.o. year old female with the above-mentioned history, who presented today on 3/2/2023 for a 6-month follow-up evaluation.     During the past 12 months, she only received three doses of Venofer total 900 mg in 06/2023. She had recurrent iron deficiency in early 09/2023 and had a further deteriorating lab study in 11/2022. We arranged for IV iron therapy, however, she did not receive that due to loss of insurance.     The patient reports that the left side of her body was weak. When she stood up, she ended up throwing herself on the ground. She hit her face on the floor and hurt her jaw. She does not like to use a cane because she can not hang onto anything. Her hands \"go weak.\" She is taking iron pills once a " day. She is also taking fiber and stool softeners. The patient recalls because her  changed his job, so she lost the insurance for a couple of months, so that is why she did not receive the IV iron.    The patient reports she has dark colored stools secondary to oral iron. She has been having constipation even taking oral iron once a day. She does use stool softener, which helps and moves bowels once every 2 to 3 days. She complains of cramping in the legs and not sleeping well.    Today on 3/2/2023, patient has deteriorating hemoglobin 10.6 g/dL, MCV 86.7, normal WBC 6510, and platelets 311,000.  She has deteriorating iron studies with ferritin 6.9 and iron saturation 6%, free iron 24 TIBC 419.       HEMATOLOGIC/ONCOLOGIC HISTORY:  The patient is a 38 y.o. year old female who is here for initial evaluation with the above-mentioned history on 5/31/2018.    Patient presented for evaluation reported by her primary care physician Dr. Bravo because of recurrent episodes of blurry visions.  Patient also has abnormal brain MRI examination on multiple occasions.  She was origin of diagnosis of multiple sclerosis, however was told by urologist Dr. Khanna that she did not have multiple sclerosis.  Most recently patient was seen by a different neurologist Dr. Scott that her MRI images did not fit with typical MS changes, instead it may be changes related to thrombotic stroke.  This patient has chronic migraine headache, and was taking Topamax low-dose with good results but with side effects and the medicine was recently changed to Trokendl XR.     This patient also has history of iron deficiency for which she has been taking oral iron for many years.  She also had vitamin B12 deficiency.  Her laboratory study on 11/20/2017 clearly reported iron deficiency with ferritin 8 ng/mL, and she was already mildly anemic with hemoglobin 11.3, and MCV 82.9. She had a normal WBC 6700 including neutrophils 3850 and lymphocytes  2190, monocytes 420.  Her platelets was 323,000.     Laboratory study on 3/23/2018 reported hemoglobin 11.6, MCV 83.1 MCHC 33.6.  Her WBC was 7900 including neutrophils 5240, lymphocytes 1980, monocytes 500.  Her platelets were 341,000.  Her iron studies reported free iron 106 TIBC 365 and iron saturation 29%, vitamin B12 at 242 pg/mL and vitamin D 20 ng/mL.  There was no ferritin level nor folic acid level.      Laboratory studies we obtained on 05/31/2018 showed significantly elevated antiphosphatidylserine antibody IgM subtype at 57, but negative for IgG and IgA subtype.  She is also marginally positive for anticardiolipin IgM at 14, but negative for IgG subtype.  She was tested negative for other hypercoagulable tests including anti-Beta-2 glycoprotein 1 antibodies, negative for lupus anticoagulant and normal antithrombin 103%, normal protein C activity 117%, normal protein S activity 81% with free protein S antigen 90%.  She is also negative for factor II mutation and factor V Leiden mutation.  We also checked her for comprehensive rheumatoid panel which is all negative as well as negative for the rheumatoid factor and direct WANG.  She also had iron deficiency, ferritin 12.5, iron saturation 12% 05/31/2018.     The patient presented on 6/14/2018 for reevaluation and to discuss laboratory results obtained on 05/31/2018 when I saw her for the 1st time for evaluation of possible thrombosis as well as persistent anemia.  The patient reports no changes in her clinical condition the past 6 days.  No recent episodes of blurry vision.  She is on low-dose aspirin 81 mg daily.  The patient also has been on low-dose oral iron 28 mg daily with good compliance.  She was not able to tolerate the large dose of ferrous sulfate at 65 mg tablets because of significant constipation.  The patient is not really compliant with oral vitamin B12 supplementation.    We started patient on Eliquis 5 mg twice a day on 6/14/2018.  This  patient is likely having antiphospholipid antibody syndrome.    In September 2018, patient reports she has no recurrence of blurry vision, no headaches and dizziness since she started on Eliquis 5 mg twice a day in middle June 2018.  She feels more fit.  Her  also reports patient is more energetic in the past 3 months.  Patient reports no easy bleeding or bruising.  She does note somewhat increased volume of her menses.    Patient reports she was not able to tolerate even low-dose oral iron with significant constipation.  She also reports vitamin B12 causes palpitation.  So currently she is not taking oral iron or vitamin B-12.      Laboratory results on 9/13/2018 reported persistently elevated anti-phosphatidylserine IgM at 65, however resolution of mildly elevated anticardiolipin IgM antibody.  She has persistent iron deficiency with ferritin 8 ng/mL, iron saturation 7%, both of which actually worse than those levels on 5/31/2018.  Her B12 level also is low at 236 pg/mL.  She has worsening anemia with hemoglobin 11.2 but maintains normal WBC 6500 and platelets 292,000.     Patient had IV Injectafer at the end of September and early October.  She reports improved energy level.      Laboratory studies on 12/13/2018 reported normalized hemoglobin at 12.8, MCV 88.0, MCHC 33.4.  She maintains normal platelets at 262,000 and WBC 7670.  Her iron study showed significantly improved ferritin 206 ng/mL and iron saturation 18%.  Her vitamin B12 level 264 pg/mL.      On 6/24/2019, the patient complained of recent palpitations, and was seen by a cardiologist, who prescribed her metoprolol 25 mg, however she has not started it yet.      Her laboratory study on 6/24/2019 reported normal hemoglobin 12.4, MCV 87.7, normal platelets 296,000 and WBC 6090.  It also reported recurrent iron deficiency with a ferritin 36.8, and iron saturation 10% free iron 34 TIBC 326.  Slightly improved vitamin B12 level at 404 pg/mL.  she has  "recurrent iron deficiency, most likely due to her heavy menses.  Patient was not able to tolerate oral iron.     Patient was given Injectafer 2 doses in July 2019.     Patient was given 1 dose of Injectafer in October 2020 due to recurrent iron deficiency.    In December 2020, she reports she had a heavy menses which lasted approximately 18 days.  She did hold her anticoagulation with no improvement in her menses.  She questions her need for iron.  She has attempted to take oral iron though is intolerant with abdominal discomfort.    She reports she has developed progressive neurologic symptoms with a stutter and generalized weakness.  She does regularly follow-up with neurology.  The patient is quite adamant that her symptoms are related to her antiphospholipid syndrome.  She is requesting referral to rheumatology today.    She does continue on Eliquis 5 mg twice daily with good tolerance.  She reports she is still \"clotting\" with intermittent discomfort in her upper and lower extremities which she attributes to blood clots.  Presently, she has no extremity pain, swelling or erythema.  She does continue on B12 supplementation.    She has vitamin D deficiency on 6/3/2021.  Patient reports that she is currently taking 50 mcg vitamin D equipment 2000 units.  I advised patient to take 6000 units every day, with the equivalent of 42,000 units/week.  She will need this dosage for about 8 weeks.     Venous Doppler study was negative for DVT.  She will continue current Eliquis anticoagulation.    I saw her in June 2021 and she has multiple complaints, including left leg swelling, significant fatigue, cyanosis of fingers and toes, and body aches.  We obtained the laboratory studies for assessment and that she was found to having vitamin D deficiency, despite of taking oral vitamin D 2000 units for about 3 to 4 months.  She also had a negative venous Doppler study of the left leg.  We also obtained lab studies for " "cryoglobulins and cold agglutinin for assessment of cyanosis.      Her left leg venous Doppler study was negative for DVT.    I spoke with the patient after she received the vitamin D results, and recommended to increase oral vitamin D to 3 tablets a day equivalent to 6000 units a day.  Patient reports this increased dose of vitamin D causes chest discomfort and that wake up in the night.  She actually stopped taking vitamin D and feels better with resolution of symptoms.  But anyway she was recently evaluated by cardiology service, and currently carries a cardiac monitor and will finish exam tomorrow with a further follow-up with cardiology.     Lab study reported no evidence of hypothyroidism.  She has completed normal TSH, free T4 and free T3.  Liver study also negative for cold agglutinin and cryoglobulin.    This patient was found to having iron deficient anemia with ferritin 14.1 ng/mL, iron saturation 6% free iron 24 TIBC 374 and hemoglobin 11.2.  Patient was given 3 doses of Venofer total 900 mg.    Today on 9/1/2022 patient is slightly improved hemoglobin 11.7, normal platelets 275,000 WBC 7270 and neutrophils 4260.  Iron study reported low iron saturation 14% with free iron 43 TIBC 301 and improved ferritin 90.5 ng/mL.      Patient was not able to tolerate oral iron to 20 once a day because of abdomen cramping.  She continues oral vitamin B12 daily.    Laboratory studies 5/25/2022 reported recurrent iron deficiency with ferritin 14.1 ng/mL iron saturation 6% with free iron 24 TIBC 374, and also hemoglobin 11.2 with normal platelets 316,000 and WBC 5860.           Vitals:    03/02/23 0838   BP: 106/75   Pulse: 87   Resp: 16   Temp: 98.4 °F (36.9 °C)   TempSrc: Temporal   SpO2: 100%   Weight: 70.3 kg (155 lb)   Height: 162.6 cm (64.02\")   PainSc:   4     Current Status 3/2/2023   ECOG score 0     PHYSICAL EXAM:   GENERAL:  Well-developed, well-nourished female sitting in wheelchair, in no acute distress.  " Orientated to time place and the people.  SKIN:  Warm, dry without rashes, purpura or petechiae.  HEENT:  Normocephalic.  Wearing mask.   LYMPHATICS:  No cervical, supraclavicular adenopathy.  CHEST: Normal respiratory effort.  Lungs clear to auscultation. Good airflow.  CARDIAC:  Regular rate and rhythm without murmurs. Normal S1,S2.  ABDOMEN:  Soft, nontender with no organomegaly or masses.  Bowel sounds normal.  EXTREMITIES:  No clubbing, cyanosis or edema.   PSYCHIATRIC:  Normal affect and mood.      RECENT LABS:   Lab Results   Component Value Date    WBC 6.51 03/02/2023    HGB 10.6 (L) 03/02/2023    HCT 34.0 03/02/2023    MCV 86.7 03/02/2023     03/02/2023     Lab Results   Component Value Date    NEUTROABS 3.76 03/02/2023       Lab Results   Component Value Date    IRON 24 (L) 03/02/2023    TIBC 419 03/02/2023    FERRITIN 6.90 (L) 03/02/2023   Iron saturation 6% on 3/2/2023, was 14% on 9/1/2022, 6% on 5/25/2022.     Lab Results   Component Value Date    FOLATE >20.00 05/31/2018     Lab Results   Component Value Date    [id number] 916 12/29/2020                 Assessment & Plan      1.  Antiphospholipid antibody syndrome, with significantly anti-phosphatidylserine IgM antibody twice more than 3 months apart.    · This patient had symptoms with blurry vision, bilateral, ? Cerebrovascular accident (CVA).  This patient has multiple major complaints, and was thought related to multiple sclerosis, however she was told by 2 different neurologists that the MRI changes did not fit with multiple sclerosis.  Instead it may be more of stroke.  This patient also has intermittent lower extremity edema although no previous evidence of venous thrombosis.   · May 2018 significantly elevated antiphosphatidylserine antibody IgM at 57.  We repeated laboratory study on 9/13/2018, she had persistently elevated anti-phosphatidylserine antibody IgM 65.  So she has antiphospholipid syndrome.    · She initiated Eliquis 5 mg  twice daily mid June 2018.  This resulted in resolution of blurry vision and numbness of her hands  · Eliquis was then dose reduced to 2.5 mg twice daily due to heavy menses and recurrent iron deficiency  · April 2020, she resumed 5 mg twice daily due to chest tightness and intermittent lower extremity swelling.  Patient did not have confirmed progressive thrombosis at that time  · The patient feels quite adamantly presently that her progressive neurologic symptoms are related to antiphospholipid antibody syndrome and request referral to rheumatology.  This was facilitated in March 2021. She will continue on Eliquis 5 mg twice daily  · On 6/3/2021, patient reports she has left leg edema last week.  She feels slightly better today.  Her venous Doppler study was negative for DVT in the left leg on 6/3/2021.  She will continue Eliquis 5 mg twice a day.   · On 6/2/2022, patient reports she has persistent intermittent migraine headaches, and vision loss is.  She continues on Eliquis with good compliance.  I agree her symptoms most likely related to ongoing thrombosis.  I discussed with the patient, recommended to switch anticoagulation to Coumadin/warfarin.  We will monitor INR weekly.  This patient and his lifelong anticoagulation.  We will try to get insurance permission for INR meter so she could measure herself at home.    · Patient reports that she continues on Eliquis anticoagulation.  Her insurance company will not cover the INR meter until she would come to the clinic for monitoring INR for more than 6 months.  Patient reports she now uses wheelchair, and is not easy for her to come to the clinic frequently.  So she opted to continue Eliquis anticoagulation.   · The patient reports on 03/02/2024 that she has been tolerating Eliquis anticoagulation. No recurrent stroke symptoms. She does have some weakness at the times not predictable and recently had one episode of falling due to weakness on the left side. No  recurrence recently.       2.  Iron deficiency anemia, secondary to heavy menses.    · Intolerant to oral iron with significant constipation  · We treated her with 2 doses of intravenous iron with Injectofar in end of September and early October 2018.   · She had IV iron in late June and early July 2019 because recurrent iron deficiency.    · She reports heavy menses.  She did discuss with GYN who recommended ablation, nonhormonal IUD or hysterectomy.  · Recurrent iron deficiency anemia with Hb 11.5, ferritin 16.9 and iron saturation 10% on 10/7/2020.  Patient was given Injectafer 1 dose in October 2020.  · Patient reports in December 2020 she had a heavy menses, one-time it lasted for 18 days despite holding anticoagulation.  Labs 12/29/2020 with recurrent iron deficiency, ferritin of 42, iron saturation of 9%, hemoglobin 10.6.  No IV iron infusion.  · Persistent worsening iron deficiency with ferritin 29, iron saturation 8% on 1/29/2021.  Patient had Injectafer 2 doses in February 2021  · Laboratory studies on 4/23/2021 reported much improved iron study with ferritin 400.7 ng/mL, iron saturation 24%, and improved hemoglobin 13.0.  · On 6/3/2021, patient maintains normal hemoglobin 13.0.  Iron study reported excellent ferritin 375.8 ng/mL and iron saturation 31% with free iron 82 TIBC 262.   · Laboratory study 5/25/2022 reported significant recurrent iron deficiency anemia with ferritin 14.1, iron saturation 6% free iron 24 and TIBC 374. We will request Venofer 300 mg weekly for 3 doses.   · Today on 9/1/2022 patient is improved ferritin 90.5 ng/mL, however still low iron saturation 14% free iron 43 TIBC 301.  She also has persistent mild anemia with Hb 11.7.  I recommended patient to be given IV iron for 300 mg for 3 doses.  · The patient did not receive intravenous iron therapy in late part of 2022 due to loss of insurance coverage. Today on 03/02/2023, patient reports she has been taking oral iron once a day,  but it causes constipation. She has worsening hemoglobin 10.6. Iron study report significantly worsened iron condition with ferritin 6.9 ng/mL, iron saturation only 6 percent with a free iron 24, TIBC 419. I recommended to give her intravenous iron therapy, Venofer 300 mg weekly for three doses and monitoring iron studies every 3 months.      3.  B12 deficiency.    · This patient has history of B12 deficiency and was given B12 injection previously.    · She continues on oral B12 at 1000 mcg daily with B12 level at 916 pg/mL on 12/29/2020.   · Continue oral vitamin B12.     4.  Progressive neurologic symptoms  · The patient reports a decline over the past 2 months with unexpected weight loss, lower extremity weakness, development of a stutter  · She is following up with neurology related to her multiple sclerosis, who does not feel her symptoms are related to her MS as her most recent MRI was fairly stable  · Patient question symptomatology related to antiphospholipid syndrome or other autoimmune etiology, she is requesting referral to rheumatology at this time.  She reports her neurologist is also recommended referral to rheumatology.  Patient was seen by rheumatologist and started on Plaquenil however was not able to tolerate it due to prolongation of QT interval.  Plaquenil was subsequently discontinued.   · Rheumatologist recommended further evaluation at the [facility].  However her insurance company declined coverage.    5.  Significant chronic fatigue.  · On 6/3/2021, patient reports she is worsening chronic fatigue, very significant, she has no energy at all, lies on bed or sitting in couch most the time.    · Patient reports she is not depressed at all.  · I recommended to obtain comprehensive thyroid profile for assessment.  Patient had a complete normal thyroid profile on 6/3/2021.   · On 6/2/2022 patient reports profound fatigue.  This is apparently related to her iron deficiency.  We will treat her  "with intravenous Venofer.  · On 03/02/2023, patient reports worsening fatigue. This is likely due to iron deficiency.  ·     6.  Peripheral cyanosis.  · 6/3/2021, patient reports her fingers and toes will return to blue color oftentimes despite of taking Eliquis for anticoagulation.  She reports no claudication.    · By examination, her finger feels cool to touch.  No signs of ischemia.  · I recommended laboratory studies for cold agglutinin and cryoglobulin for assessment.  Laboratory study on 6/3/2021 reported negative results for both cold agglutinin and cryoglobulin.   · Symptoms also likely related to hypercoagulable condition.  No changes of her condition as of 9/1/2022.  · On 03/02/2023, patient reports persistent cyanosis associated with her toes.  ·       PLAN:    1. We will arrange intravenous Venofer 300 mg weekly for 3 doses.    2. Continue Eliquis 5 mg every 12 hours.   3. Continue oral vitamin B12 daily.   4. Patient will continue oral iron once a day.    5. Patient will have laboratory study every 3 months for CBC, ferritin, iron profile. We will also add B12 and folate in 3 months for assessment.  6. Patient will come back to see me in 6 months for reevaluation with laboratory studies.      I discussed with the patient about laboratory results and further management plan.  Patient voiced understanding and agreeable.      Johny Burgos MD PhD   03/02/2023          CC:   JUAN Santiago M.D.              Transcribed from ambient dictation for Johny Burgos MD PhD by Ryanne Abreu.  03/02/23   09:40 EST    [facility] Provider Statement:[id number]::\"Patient or patient representative verbalized consent to the visit recording.\",\"I have personally performed the services described in this document as transcribed by the above individual, and it is both accurate and complete.\"        Addendum:  Patient was given a dose of Venofer on 3/15/2023.  She reports back on 3/17/2023 complaining of " hypotension and malaise the day after her Venofer treatment.  Blood pressure was 90s/40s on 3/16/2023, and 100/55 3/17/2023.  Patient has been hydrating herself with electrolyte replacement drinks and a feeling better afterwards.  She reports she has experienced those symptoms every time she received IV Venofer treatment.  She also had a similar problem after intravenous Injectafer infusion.    Patient receives Venofer without the Benadryl, she was not tolerating Benadryl previously.  I am wondering whether given her Pepcid 20 mg IV as premedication would ease her symptoms.  I will do just that to try.      CRISTIAN YANG M.D., Ph.D.    3/20/2023.

## 2023-03-15 ENCOUNTER — INFUSION (OUTPATIENT)
Dept: ONCOLOGY | Facility: HOSPITAL | Age: 43
End: 2023-03-15
Payer: COMMERCIAL

## 2023-03-15 VITALS
WEIGHT: 154.8 LBS | DIASTOLIC BLOOD PRESSURE: 74 MMHG | RESPIRATION RATE: 16 BRPM | SYSTOLIC BLOOD PRESSURE: 106 MMHG | HEART RATE: 92 BPM | TEMPERATURE: 98 F | BODY MASS INDEX: 26.56 KG/M2 | OXYGEN SATURATION: 100 %

## 2023-03-15 DIAGNOSIS — T45.4X5A ADVERSE EFFECT OF IRON, INITIAL ENCOUNTER: Primary | ICD-10-CM

## 2023-03-15 DIAGNOSIS — D50.9 IRON DEFICIENCY ANEMIA, UNSPECIFIED IRON DEFICIENCY ANEMIA TYPE: ICD-10-CM

## 2023-03-15 PROCEDURE — 96366 THER/PROPH/DIAG IV INF ADDON: CPT

## 2023-03-15 PROCEDURE — 96365 THER/PROPH/DIAG IV INF INIT: CPT

## 2023-03-15 PROCEDURE — 25010000002 IRON SUCROSE PER 1 MG: Performed by: NURSE PRACTITIONER

## 2023-03-15 RX ORDER — ACETAMINOPHEN 325 MG/1
650 TABLET ORAL ONCE
Status: DISCONTINUED | OUTPATIENT
Start: 2023-03-15 | End: 2023-03-15 | Stop reason: HOSPADM

## 2023-03-15 RX ORDER — SODIUM CHLORIDE 9 MG/ML
250 INJECTION, SOLUTION INTRAVENOUS ONCE
Status: COMPLETED | OUTPATIENT
Start: 2023-03-15 | End: 2023-03-15

## 2023-03-15 RX ORDER — DIPHENHYDRAMINE HCL 25 MG
25 CAPSULE ORAL ONCE
Status: DISCONTINUED | OUTPATIENT
Start: 2023-03-15 | End: 2023-03-15 | Stop reason: HOSPADM

## 2023-03-15 RX ADMIN — IRON SUCROSE 300 MG: 20 INJECTION, SOLUTION INTRAVENOUS at 08:09

## 2023-03-15 RX ADMIN — SODIUM CHLORIDE 250 ML: 9 INJECTION, SOLUTION INTRAVENOUS at 07:55

## 2023-03-17 ENCOUNTER — TELEPHONE (OUTPATIENT)
Dept: ONCOLOGY | Facility: CLINIC | Age: 43
End: 2023-03-17
Payer: COMMERCIAL

## 2023-03-17 NOTE — TELEPHONE ENCOUNTER
Patient states that after her Venofer infusion on 3/15 she had some low blood pressure readings and malaise the following day. Her blood pressure was 90/40s on 3/16. Today she is 100/55 and hydrating with electrolyte replacement drinks and is feeling better overall. The patient states she experiences these symptoms after every IV iron infusion with Injectafer or Venofer. She has not mentioned it because she thought these symptoms were normal. I spoke with SUE Murrell regarding the patients symptoms and she agreed to r/s her Monday Venofer appointment to Tuesday, since Dr. Burgos and his clinic RN are out of the office today. A message will be sent to both detailing the patients symptoms. The patient voiced understanding  to go to the ER if her symptoms worsen and will monitor her BP and hydrate. A message has been sent to scheduling to r/s her Venofer.

## 2023-03-17 NOTE — TELEPHONE ENCOUNTER
----- Message from Bing Oliveros RN sent at 3/17/2023  9:55 AM EDT -----  Hello, I am covering for Elyse today. I am not sure if Daksha is here, but if she is not could someone take care of this patient today for me? She is needing her Venofer to be r/s to 3/21 at 0730 at . Please call her when you have her new appt.     Thank you all!

## 2023-03-24 ENCOUNTER — INFUSION (OUTPATIENT)
Dept: ONCOLOGY | Facility: HOSPITAL | Age: 43
End: 2023-03-24
Payer: COMMERCIAL

## 2023-03-24 VITALS
DIASTOLIC BLOOD PRESSURE: 72 MMHG | TEMPERATURE: 98.3 F | OXYGEN SATURATION: 97 % | BODY MASS INDEX: 26.32 KG/M2 | HEART RATE: 90 BPM | WEIGHT: 153.4 LBS | SYSTOLIC BLOOD PRESSURE: 104 MMHG | RESPIRATION RATE: 16 BRPM

## 2023-03-24 DIAGNOSIS — T45.4X5A ADVERSE EFFECT OF IRON, INITIAL ENCOUNTER: Primary | ICD-10-CM

## 2023-03-24 DIAGNOSIS — D50.9 IRON DEFICIENCY ANEMIA, UNSPECIFIED IRON DEFICIENCY ANEMIA TYPE: ICD-10-CM

## 2023-03-24 PROCEDURE — 25010000002 IRON SUCROSE PER 1 MG: Performed by: INTERNAL MEDICINE

## 2023-03-24 PROCEDURE — 96365 THER/PROPH/DIAG IV INF INIT: CPT

## 2023-03-24 PROCEDURE — 96366 THER/PROPH/DIAG IV INF ADDON: CPT

## 2023-03-24 RX ADMIN — IRON SUCROSE 300 MG: 20 INJECTION, SOLUTION INTRAVENOUS at 08:26

## 2023-03-24 NOTE — NURSING NOTE
Pt to infusion room for venofer infusion.   Pt reports side effects the day after her iron infusions.  Discussed premedications with patient (tylenol, benadryl and pepcid) to prevent side effects previously experienced.  Progress note from Dr Burgos on 03/02/03 noted.  Pt refuses benadryl and pepcid.   States her symptoms do not occur until day after infusion.   Discussed with patient in length purpose of meds to help prevent side effects from occurring.   Pt reports she took tylenol already this morning., patient states she will take tylenol and pepcid in the morning when she gets up.

## 2023-03-28 ENCOUNTER — INFUSION (OUTPATIENT)
Dept: ONCOLOGY | Facility: HOSPITAL | Age: 43
End: 2023-03-28
Payer: COMMERCIAL

## 2023-03-28 VITALS
OXYGEN SATURATION: 100 % | SYSTOLIC BLOOD PRESSURE: 95 MMHG | DIASTOLIC BLOOD PRESSURE: 60 MMHG | WEIGHT: 153.8 LBS | BODY MASS INDEX: 26.39 KG/M2 | TEMPERATURE: 98.6 F | HEART RATE: 82 BPM

## 2023-03-28 DIAGNOSIS — D50.9 IRON DEFICIENCY ANEMIA, UNSPECIFIED IRON DEFICIENCY ANEMIA TYPE: ICD-10-CM

## 2023-03-28 DIAGNOSIS — T45.4X5A ADVERSE EFFECT OF IRON, INITIAL ENCOUNTER: Primary | ICD-10-CM

## 2023-03-28 PROCEDURE — 96366 THER/PROPH/DIAG IV INF ADDON: CPT

## 2023-03-28 PROCEDURE — 25010000002 IRON SUCROSE PER 1 MG: Performed by: NURSE PRACTITIONER

## 2023-03-28 PROCEDURE — 96365 THER/PROPH/DIAG IV INF INIT: CPT

## 2023-03-28 RX ORDER — SODIUM CHLORIDE 9 MG/ML
250 INJECTION, SOLUTION INTRAVENOUS ONCE
Status: COMPLETED | OUTPATIENT
Start: 2023-03-28 | End: 2023-03-28

## 2023-03-28 RX ADMIN — IRON SUCROSE 300 MG: 20 INJECTION, SOLUTION INTRAVENOUS at 07:59

## 2023-03-28 RX ADMIN — SODIUM CHLORIDE 250 ML: 9 INJECTION, SOLUTION INTRAVENOUS at 07:59

## 2023-05-12 RX ORDER — APIXABAN 5 MG/1
TABLET, FILM COATED ORAL
Qty: 180 TABLET | Refills: 0 | Status: SHIPPED | OUTPATIENT
Start: 2023-05-12

## 2023-05-22 DIAGNOSIS — D50.0 IRON DEFICIENCY ANEMIA DUE TO CHRONIC BLOOD LOSS: ICD-10-CM

## 2023-05-22 RX ORDER — IRON PS COMPLEX/B12/FOLIC ACID 150-25-1
1 CAPSULE ORAL
Qty: 90 CAPSULE | Refills: 1 | Status: SHIPPED | OUTPATIENT
Start: 2023-05-22

## 2023-05-23 ENCOUNTER — TELEPHONE (OUTPATIENT)
Dept: ONCOLOGY | Facility: CLINIC | Age: 43
End: 2023-05-23
Payer: COMMERCIAL

## 2023-05-23 ENCOUNTER — APPOINTMENT (OUTPATIENT)
Dept: ONCOLOGY | Facility: HOSPITAL | Age: 43
End: 2023-05-23
Payer: COMMERCIAL

## 2023-05-23 ENCOUNTER — LAB (OUTPATIENT)
Dept: OTHER | Facility: HOSPITAL | Age: 43
End: 2023-05-23
Payer: COMMERCIAL

## 2023-05-23 DIAGNOSIS — D50.9 IRON DEFICIENCY ANEMIA, UNSPECIFIED IRON DEFICIENCY ANEMIA TYPE: ICD-10-CM

## 2023-05-23 DIAGNOSIS — T45.4X5A ADVERSE EFFECT OF IRON, INITIAL ENCOUNTER: ICD-10-CM

## 2023-05-23 LAB
BASOPHILS # BLD AUTO: 0.03 10*3/MM3 (ref 0–0.2)
BASOPHILS NFR BLD AUTO: 0.5 % (ref 0–1.5)
DEPRECATED RDW RBC AUTO: 42.5 FL (ref 37–54)
EOSINOPHIL # BLD AUTO: 0.15 10*3/MM3 (ref 0–0.4)
EOSINOPHIL NFR BLD AUTO: 2.3 % (ref 0.3–6.2)
ERYTHROCYTE [DISTWIDTH] IN BLOOD BY AUTOMATED COUNT: 13.1 % (ref 12.3–15.4)
FERRITIN SERPL-MCNC: 86.9 NG/ML (ref 13–150)
HCT VFR BLD AUTO: 35.9 % (ref 34–46.6)
HGB BLD-MCNC: 11.8 G/DL (ref 12–15.9)
IMM GRANULOCYTES # BLD AUTO: 0.02 10*3/MM3 (ref 0–0.05)
IMM GRANULOCYTES NFR BLD AUTO: 0.3 % (ref 0–0.5)
IRON 24H UR-MRATE: 58 MCG/DL (ref 37–145)
IRON SATN MFR SERPL: 18 % (ref 20–50)
LYMPHOCYTES # BLD AUTO: 2.04 10*3/MM3 (ref 0.7–3.1)
LYMPHOCYTES NFR BLD AUTO: 31 % (ref 19.6–45.3)
MCH RBC QN AUTO: 29.1 PG (ref 26.6–33)
MCHC RBC AUTO-ENTMCNC: 32.9 G/DL (ref 31.5–35.7)
MCV RBC AUTO: 88.6 FL (ref 79–97)
MONOCYTES # BLD AUTO: 0.28 10*3/MM3 (ref 0.1–0.9)
MONOCYTES NFR BLD AUTO: 4.2 % (ref 5–12)
NEUTROPHILS NFR BLD AUTO: 4.07 10*3/MM3 (ref 1.7–7)
NEUTROPHILS NFR BLD AUTO: 61.7 % (ref 42.7–76)
NRBC BLD AUTO-RTO: 0 /100 WBC (ref 0–0.2)
PLATELET # BLD AUTO: 314 10*3/MM3 (ref 140–450)
PMV BLD AUTO: 9.1 FL (ref 6–12)
RBC # BLD AUTO: 4.05 10*6/MM3 (ref 3.77–5.28)
TIBC SERPL-MCNC: 314 MCG/DL (ref 298–536)
TRANSFERRIN SERPL-MCNC: 211 MG/DL (ref 200–360)
WBC NRBC COR # BLD: 6.59 10*3/MM3 (ref 3.4–10.8)

## 2023-05-23 PROCEDURE — 84466 ASSAY OF TRANSFERRIN: CPT | Performed by: INTERNAL MEDICINE

## 2023-05-23 PROCEDURE — 36415 COLL VENOUS BLD VENIPUNCTURE: CPT

## 2023-05-23 PROCEDURE — 83540 ASSAY OF IRON: CPT | Performed by: INTERNAL MEDICINE

## 2023-05-23 PROCEDURE — 85025 COMPLETE CBC W/AUTO DIFF WBC: CPT | Performed by: INTERNAL MEDICINE

## 2023-05-23 PROCEDURE — 82728 ASSAY OF FERRITIN: CPT | Performed by: INTERNAL MEDICINE

## 2023-05-23 NOTE — TELEPHONE ENCOUNTER
Caller: [facility] - [place], [place] - [address] - [phone] [facility] [phone] FX    Relationship: Pharmacy    Best call back number: [phone]    What is the best time to reach you: ASAP    Who are you requesting to speak with (clinical staff, provider,  specific staff member): CLINICAL        What was the call regarding: JACQUELINE FROM [facility] WANTS TO VERIFY THAT WE RECEIVED THE ELIQUIS 5MG TABLET REFILL REQUEST THAT WAS FAXED TO THE OFFICE FAX.  SHE SAYS THE INITIAL REQUEST CAME FROM [facility] ON THE 21ST BUT PT WANTS TO GET THIS FROM [facility] NOW, THEY DID NOT SENT A TRANSFER, SO [facility] HAS FAXED US THIS REQUEST NOW.    Do you require a callback: YES PLEASE CALL TO VERIFY IT WAS RECEIVED AND THE STATUS.

## 2023-05-23 NOTE — TELEPHONE ENCOUNTER
Refill request for Eliquis was not received. Refill for Elquis sent to [facility].

## 2023-05-23 NOTE — TELEPHONE ENCOUNTER
Attempted to call no answer left message to return call.  Patient called to review labs from this morning. Ferritin 86.9 and Iron Sat 18. Patient would qualify for IV Venofer

## 2023-05-25 ENCOUNTER — TELEPHONE (OUTPATIENT)
Dept: ONCOLOGY | Facility: CLINIC | Age: 43
End: 2023-05-25
Payer: COMMERCIAL

## 2023-05-25 NOTE — TELEPHONE ENCOUNTER
Patient called and labs review: Ferritin 86.9 and Iron Sat 18. Patient stated she will continue with oral iron supplements at time. Patient's next follow up is 8/3/2023. Encouraged patient to call if she needs labs checked sooner.  Patient v/u

## 2023-07-12 ENCOUNTER — OFFICE VISIT (OUTPATIENT)
Dept: OBSTETRICS AND GYNECOLOGY | Facility: CLINIC | Age: 43
End: 2023-07-12
Payer: COMMERCIAL

## 2023-07-12 VITALS
WEIGHT: 156 LBS | DIASTOLIC BLOOD PRESSURE: 68 MMHG | HEIGHT: 64 IN | BODY MASS INDEX: 26.63 KG/M2 | SYSTOLIC BLOOD PRESSURE: 106 MMHG

## 2023-07-12 DIAGNOSIS — N94.6 DYSMENORRHEA: ICD-10-CM

## 2023-07-12 DIAGNOSIS — Z01.419 ROUTINE GYNECOLOGICAL EXAMINATION: ICD-10-CM

## 2023-07-12 DIAGNOSIS — Z11.3 SCREENING FOR STDS (SEXUALLY TRANSMITTED DISEASES): ICD-10-CM

## 2023-07-12 DIAGNOSIS — Z01.419 PAP SMEAR, LOW-RISK: Primary | ICD-10-CM

## 2023-07-12 DIAGNOSIS — N85.2 ENLARGED UTERUS: ICD-10-CM

## 2023-07-12 DIAGNOSIS — N93.9 ABNORMAL UTERINE BLEEDING (AUB): ICD-10-CM

## 2023-07-12 DIAGNOSIS — Z11.51 SCREENING FOR HUMAN PAPILLOMAVIRUS: ICD-10-CM

## 2023-07-12 DIAGNOSIS — Z12.31 ENCOUNTER FOR SCREENING MAMMOGRAM FOR MALIGNANT NEOPLASM OF BREAST: ICD-10-CM

## 2023-07-12 DIAGNOSIS — Z71.85 HPV VACCINE COUNSELING: ICD-10-CM

## 2023-07-12 LAB
B-HCG UR QL: NEGATIVE
BILIRUB BLD-MCNC: NEGATIVE MG/DL
CLARITY, POC: CLEAR
COLOR UR: YELLOW
EXPIRATION DATE: NORMAL
GLUCOSE UR STRIP-MCNC: NEGATIVE MG/DL
INTERNAL NEGATIVE CONTROL: NORMAL
INTERNAL POSITIVE CONTROL: NORMAL
KETONES UR QL: NEGATIVE
LEUKOCYTE EST, POC: NEGATIVE
Lab: NORMAL
NITRITE UR-MCNC: NEGATIVE MG/ML
PH UR: 5 [PH] (ref 5–8)
PROT UR STRIP-MCNC: NEGATIVE MG/DL
RBC # UR STRIP: NEGATIVE /UL
SP GR UR: 1 (ref 1–1.03)
UROBILINOGEN UR QL: NORMAL

## 2023-07-12 RX ORDER — SODIUM FLUORIDE 6 MG/ML
PASTE, DENTIFRICE DENTAL
COMMUNITY
Start: 2023-03-24
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
## 2023-07-13 LAB
HBV SURFACE AG SERPL QL IA: NEGATIVE
HCV IGG SERPL QL IA: NON REACTIVE
HIV 1+2 AB+HIV1 P24 AG SERPL QL IA: NON REACTIVE
HSV1 IGG SER IA-ACNC: <0.91 INDEX (ref 0–0.9)
HSV2 IGG SER IA-ACNC: 5.34 INDEX (ref 0–0.9)
RPR SER QL: NON REACTIVE
T3 SERPL-MCNC: 150 NG/DL (ref 71–180)
T4 FREE SERPL-MCNC: 1.09 NG/DL (ref 0.82–1.77)
THYROPEROXIDASE AB SERPL-ACNC: <9 IU/ML (ref 0–34)
TSH SERPL DL<=0.005 MIU/L-ACNC: 2.81 UIU/ML (ref 0.45–4.5)

## 2023-07-13 NOTE — PROGRESS NOTES
PIP= thyroid testing is normal. Blood portion of STD panel shows previous exposure to the genital herpes virus

## 2023-07-14 LAB
C TRACH RRNA CVX QL NAA+PROBE: NEGATIVE
CYTOLOGIST CVX/VAG CYTO: NORMAL
CYTOLOGY CVX/VAG DOC CYTO: NORMAL
CYTOLOGY CVX/VAG DOC THIN PREP: NORMAL
DX ICD CODE: NORMAL
HIV 1 & 2 AB SER-IMP: NORMAL
HPV GENOTYPE REFLEX: NORMAL
HPV I/H RISK 4 DNA CVX QL PROBE+SIG AMP: NEGATIVE
N GONORRHOEA RRNA CVX QL NAA+PROBE: NEGATIVE
OTHER STN SPEC: NORMAL
STAT OF ADQ CVX/VAG CYTO-IMP: NORMAL
T VAGINALIS RRNA SPEC QL NAA+PROBE: NEGATIVE

## 2023-08-04 RX ORDER — APIXABAN 5 MG/1
TABLET, FILM COATED ORAL
Qty: 180 TABLET | Refills: 0 | Status: SHIPPED | OUTPATIENT
Start: 2023-08-04

## 2023-08-08 ENCOUNTER — HOSPITAL ENCOUNTER (OUTPATIENT)
Dept: MAMMOGRAPHY | Facility: HOSPITAL | Age: 43
Discharge: HOME | End: 2023-08-08
Admitting: OBSTETRICS & GYNECOLOGY
Payer: COMMERCIAL

## 2023-08-08 DIAGNOSIS — Z12.31 ENCOUNTER FOR SCREENING MAMMOGRAM FOR MALIGNANT NEOPLASM OF BREAST: ICD-10-CM

## 2023-08-08 PROCEDURE — 77067 SCR MAMMO BI INCL CAD: CPT

## 2023-08-08 PROCEDURE — 77063 BREAST TOMOSYNTHESIS BI: CPT

## 2023-08-09 DIAGNOSIS — D50.0 IRON DEFICIENCY ANEMIA DUE TO CHRONIC BLOOD LOSS: ICD-10-CM

## 2023-08-09 RX ORDER — IRON PS COMPLEX/B12/FOLIC ACID 150-25-1
1 CAPSULE ORAL
Qty: 90 CAPSULE | Refills: 0 | Status: SHIPPED | OUTPATIENT
Start: 2023-08-09

## 2023-08-10 RX ORDER — WARFARIN SODIUM 5 MG/1
TABLET ORAL
Qty: 60 TABLET | Refills: 0 | OUTPATIENT
Start: 2023-08-10

## 2023-08-10 NOTE — PROGRESS NOTES
[employer] 14 Day Sensor Miscellaneous  Last Written Prescription Date:   11/22/2021  Last Fill Quantity: 2,   # refills: 11  Last Office Visit :  8/16/2022  Future Office visit:   1/16/2023  2 each, 11 Refills sent to phil Kumar RN  Central Triage Red Flags/Med Refills     PIP= normal MMG

## 2023-08-11 RX ORDER — WARFARIN SODIUM 5 MG/1
TABLET ORAL
Qty: 60 TABLET | Refills: 0 | OUTPATIENT
Start: 2023-08-11

## 2023-08-16 ENCOUNTER — PROCEDURE VISIT (OUTPATIENT)
Dept: OBSTETRICS AND GYNECOLOGY | Facility: CLINIC | Age: 43
End: 2023-08-16
Payer: COMMERCIAL

## 2023-08-16 VITALS
DIASTOLIC BLOOD PRESSURE: 62 MMHG | SYSTOLIC BLOOD PRESSURE: 108 MMHG | BODY MASS INDEX: 27.18 KG/M2 | HEIGHT: 64 IN | WEIGHT: 159.2 LBS

## 2023-08-16 DIAGNOSIS — N85.2 ENLARGED UTERUS: ICD-10-CM

## 2023-08-16 DIAGNOSIS — Z01.818 PREOPERATIVE EXAM FOR GYNECOLOGIC SURGERY: ICD-10-CM

## 2023-08-16 DIAGNOSIS — R93.89 THICKENED ENDOMETRIUM: ICD-10-CM

## 2023-08-16 DIAGNOSIS — N93.9 ABNORMAL UTERINE BLEEDING (AUB): Primary | ICD-10-CM

## 2023-08-16 RX ORDER — PENCICLOVIR 10 MG/G
CREAM TOPICAL
COMMUNITY
Start: 2023-07-20
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
## 2023-08-17 ENCOUNTER — LAB (OUTPATIENT)
Dept: OTHER | Facility: HOSPITAL | Age: 43
End: 2023-08-17
Payer: COMMERCIAL

## 2023-08-17 ENCOUNTER — OFFICE VISIT (OUTPATIENT)
Dept: ONCOLOGY | Facility: CLINIC | Age: 43
End: 2023-08-17
Payer: COMMERCIAL

## 2023-08-17 VITALS
BODY MASS INDEX: 26.5 KG/M2 | OXYGEN SATURATION: 100 % | DIASTOLIC BLOOD PRESSURE: 66 MMHG | SYSTOLIC BLOOD PRESSURE: 103 MMHG | HEART RATE: 84 BPM | HEIGHT: 64 IN | RESPIRATION RATE: 16 BRPM | TEMPERATURE: 98.2 F | WEIGHT: 155.2 LBS

## 2023-08-17 DIAGNOSIS — D50.0 IRON DEFICIENCY ANEMIA DUE TO CHRONIC BLOOD LOSS: ICD-10-CM

## 2023-08-17 DIAGNOSIS — T45.4X5A ADVERSE EFFECT OF IRON, INITIAL ENCOUNTER: ICD-10-CM

## 2023-08-17 DIAGNOSIS — M35.00 SJOGREN'S SYNDROME, WITH UNSPECIFIED ORGAN INVOLVEMENT: ICD-10-CM

## 2023-08-17 DIAGNOSIS — D68.59 HYPERCOAGULATION SYNDROME: Primary | ICD-10-CM

## 2023-08-17 DIAGNOSIS — D50.9 IRON DEFICIENCY ANEMIA, UNSPECIFIED IRON DEFICIENCY ANEMIA TYPE: ICD-10-CM

## 2023-08-17 LAB
BASOPHILS # BLD AUTO: 0.03 10*3/MM3 (ref 0–0.2)
BASOPHILS NFR BLD AUTO: 0.5 % (ref 0–1.5)
DEPRECATED RDW RBC AUTO: 39 FL (ref 37–54)
EOSINOPHIL # BLD AUTO: 0.1 10*3/MM3 (ref 0–0.4)
EOSINOPHIL NFR BLD AUTO: 1.7 % (ref 0.3–6.2)
ERYTHROCYTE [DISTWIDTH] IN BLOOD BY AUTOMATED COUNT: 12.1 % (ref 12.3–15.4)
FERRITIN SERPL-MCNC: 38.2 NG/ML (ref 13–150)
HCT VFR BLD AUTO: 34.9 % (ref 34–46.6)
HGB BLD-MCNC: 11.4 G/DL (ref 12–15.9)
IMM GRANULOCYTES # BLD AUTO: 0.02 10*3/MM3 (ref 0–0.05)
IMM GRANULOCYTES NFR BLD AUTO: 0.3 % (ref 0–0.5)
IRON 24H UR-MRATE: 78 MCG/DL (ref 37–145)
IRON SATN MFR SERPL: 22 % (ref 20–50)
LYMPHOCYTES # BLD AUTO: 2.08 10*3/MM3 (ref 0.7–3.1)
LYMPHOCYTES NFR BLD AUTO: 34.8 % (ref 19.6–45.3)
MCH RBC QN AUTO: 28.8 PG (ref 26.6–33)
MCHC RBC AUTO-ENTMCNC: 32.7 G/DL (ref 31.5–35.7)
MCV RBC AUTO: 88.1 FL (ref 79–97)
MONOCYTES # BLD AUTO: 0.32 10*3/MM3 (ref 0.1–0.9)
MONOCYTES NFR BLD AUTO: 5.4 % (ref 5–12)
NEUTROPHILS NFR BLD AUTO: 3.42 10*3/MM3 (ref 1.7–7)
NEUTROPHILS NFR BLD AUTO: 57.3 % (ref 42.7–76)
NRBC BLD AUTO-RTO: 0 /100 WBC (ref 0–0.2)
PLATELET # BLD AUTO: 339 10*3/MM3 (ref 140–450)
PMV BLD AUTO: 9.1 FL (ref 6–12)
RBC # BLD AUTO: 3.96 10*6/MM3 (ref 3.77–5.28)
TIBC SERPL-MCNC: 359 MCG/DL (ref 298–536)
TRANSFERRIN SERPL-MCNC: 241 MG/DL (ref 200–360)
WBC NRBC COR # BLD: 5.97 10*3/MM3 (ref 3.4–10.8)

## 2023-08-17 PROCEDURE — 36415 COLL VENOUS BLD VENIPUNCTURE: CPT

## 2023-08-17 PROCEDURE — 83540 ASSAY OF IRON: CPT | Performed by: INTERNAL MEDICINE

## 2023-08-17 PROCEDURE — 84466 ASSAY OF TRANSFERRIN: CPT | Performed by: INTERNAL MEDICINE

## 2023-08-17 PROCEDURE — 85025 COMPLETE CBC W/AUTO DIFF WBC: CPT | Performed by: INTERNAL MEDICINE

## 2023-08-17 PROCEDURE — 82728 ASSAY OF FERRITIN: CPT | Performed by: INTERNAL MEDICINE

## 2023-08-17 RX ORDER — CLOPIDOGREL BISULFATE 75 MG/1
75 TABLET ORAL DAILY
Qty: 30 TABLET | Refills: 5 | Status: SHIPPED | OUTPATIENT
Start: 2023-08-17

## 2023-08-17 RX ORDER — ENOXAPARIN SODIUM 100 MG/ML
70 INJECTION SUBCUTANEOUS EVERY 12 HOURS SCHEDULED
Qty: 8 ML | Refills: 0 | Status: SHIPPED | OUTPATIENT
Start: 2023-08-17

## 2023-08-17 RX ORDER — CAMPHOR, MENTHOL, METHYL SALICYLATE 21.56; 41.73; 69.55 MG/1; MG/1; MG/1
PATCH PERCUTANEOUS; TOPICAL; TRANSDERMAL
COMMUNITY

## 2023-08-17 NOTE — PROGRESS NOTES
"    .   REASON FOR FOLLOW UP:     1.  Suspected episodes of strokes.   Laboratory study on 5/31/2018 reported significantly elevated anti-phosphatidylserine IgM at 57, and marginally elevated anticardiolipin IgM 14.  All others were negative.    Repeated laboratory study on 9/13/2018 showed persistently significantly elevated anti-phosphatidylserine IgM at 65.    2.  Anemia. Laboratory tests confirmed vitamin B12 deficiency and iron deficiency on 5/31/2018.    She also has persistent significant iron deficiency and vitamin B12 deficiency on 9/13/2018.    Patient was not able to tolerate oral iron due to significant constipation, she was given 2 doses Injectafer in end of September 2018, with significant improved iron panel and normalization of hemoglobin in December 2018.    Patient had recurrent iron deficiency in June 2019 due to heavy menses.  Patient was given Injectafer weekly for 2 doses.  Her vitamin B12 level improved at 404 on 6/24/2019.     Recurrent iron deficiency anemia, 2 doses of Injectafer in February 2021.    Patient now taking vitamin B12 supplement with great tolerance.   3.  Vitamin D deficiency confirmed on 6/3/2021.         HISTORY OF PRESENT ILLNESS:  The patient is a 43 y.o. year old female with the above-mentioned history, who presented today on 08/17/2023 for a 6-month follow-up evaluation.    This patient did receive the last dose of IV Venofer treatment on 03/28/2023.    The patient reports that the second day after the IV Venofer infusion, she experienced stabbing pains in her chest.  She also develops skin rashes and hives after she left her clinic.  She did not take Pepcid because it causes her tachycardia when she was taking it previously. She believes Benadryl causes her tachycardia also. She took Tylenol. She reports having a \"prickly\" rash with itching. The patient reports feeling slightly better with her energy level after the IV Venofer infusion.      She took a break from the " oral iron after her infusion, but has now resumed taking it once a day.  The patient reports she continues taking oral iron with Ferrex 150 once a day.    The patient reports she is still having a lot of clotting issues. She takes her medication twice a day.  She has been taking Eliquis twice a day.       The patient reports that about a week ago, she had a distended peripheral vein on her left forearm, feels hard and painful. That has resolved. The patient reports no chest pain, dyspnea. She also reports strange phenomenon that if she eats vegetables, she feels like her blood is thickening up and having lightheadedness and migraine headache.  However she also sometimes craves vegetables. The patient also reports she has dry eyes without any pains. She has gross dry eyes and blurry vision sometimes. She reports no pains in her eyes.    Upon questioning, patient reports she has heavy menses, and was diagnosed of thickened endometrium. She is scheduled to have D&C on 08/29/2023 by Dr. Pierce. She is asking for periprocedural management of anticoagulation.     Laboratory studies obtained on 08/17/2023 reported stable mild anemia with hemoglobin 11.4, MCV 88.1, platelets 339,000, WBC 5970.  Iron study reported ferritin 38.2, free iron 78 TIBC 359 and iron saturation 22%.         HEMATOLOGIC/ONCOLOGIC HISTORY:  The patient is a 38 y.o. year old female who is here for initial evaluation with the above-mentioned history on 5/31/2018.    Patient presented for evaluation reported by her primary care physician Dr. Bravo because of recurrent episodes of blurry visions.  Patient also has abnormal brain MRI examination on multiple occasions.  She was origin of diagnosis of multiple sclerosis, however was told by urologist Dr. Khanna that she did not have multiple sclerosis.  Most recently patient was seen by a different neurologist Dr. Scott that her MRI images did not fit with typical MS changes, instead it may be changes  related to thrombotic stroke.  This patient has chronic migraine headache, and was taking Topamax low-dose with good results but with side effects and the medicine was recently changed to Trokendl XR.     This patient also has history of iron deficiency for which she has been taking oral iron for many years.  She also had vitamin B12 deficiency.  Her laboratory study on 11/20/2017 clearly reported iron deficiency with ferritin 8 ng/mL, and she was already mildly anemic with hemoglobin 11.3, and MCV 82.9. She had a normal WBC 6700 including neutrophils 3850 and lymphocytes 2190, monocytes 420.  Her platelets was 323,000.     Laboratory study on 3/23/2018 reported hemoglobin 11.6, MCV 83.1 MCHC 33.6.  Her WBC was 7900 including neutrophils 5240, lymphocytes 1980, monocytes 500.  Her platelets were 341,000.  Her iron studies reported free iron 106 TIBC 365 and iron saturation 29%, vitamin B12 at 242 pg/mL and vitamin D 20 ng/mL.  There was no ferritin level nor folic acid level.      Laboratory studies we obtained on 05/31/2018 showed significantly elevated antiphosphatidylserine antibody IgM subtype at 57, but negative for IgG and IgA subtype.  She is also marginally positive for anticardiolipin IgM at 14, but negative for IgG subtype.  She was tested negative for other hypercoagulable tests including anti-Beta-2 glycoprotein 1 antibodies, negative for lupus anticoagulant and normal antithrombin 103%, normal protein C activity 117%, normal protein S activity 81% with free protein S antigen 90%.  She is also negative for factor II mutation and factor V Leiden mutation.  We also checked her for comprehensive rheumatoid panel which is all negative as well as negative for the rheumatoid factor and direct WANG.  She also had iron deficiency, ferritin 12.5, iron saturation 12% 05/31/2018.     The patient presented on 6/14/2018 for reevaluation and to discuss laboratory results obtained on 05/31/2018 when I saw her for the  1st time for evaluation of possible thrombosis as well as persistent anemia.  The patient reports no changes in her clinical condition the past 6 days.  No recent episodes of blurry vision.  She is on low-dose aspirin 81 mg daily.  The patient also has been on low-dose oral iron 28 mg daily with good compliance.  She was not able to tolerate the large dose of ferrous sulfate at 65 mg tablets because of significant constipation.  The patient is not really compliant with oral vitamin B12 supplementation.    We started patient on Eliquis 5 mg twice a day on 6/14/2018.  This patient is likely having antiphospholipid antibody syndrome.    In September 2018, patient reports she has no recurrence of blurry vision, no headaches and dizziness since she started on Eliquis 5 mg twice a day in middle June 2018.  She feels more fit.  Her  also reports patient is more energetic in the past 3 months.  Patient reports no easy bleeding or bruising.  She does note somewhat increased volume of her menses.    Patient reports she was not able to tolerate even low-dose oral iron with significant constipation.  She also reports vitamin B12 causes palpitation.  So currently she is not taking oral iron or vitamin B-12.      Laboratory results on 9/13/2018 reported persistently elevated anti-phosphatidylserine IgM at 65, however resolution of mildly elevated anticardiolipin IgM antibody.  She has persistent iron deficiency with ferritin 8 ng/mL, iron saturation 7%, both of which actually worse than those levels on 5/31/2018.  Her B12 level also is low at 236 pg/mL.  She has worsening anemia with hemoglobin 11.2 but maintains normal WBC 6500 and platelets 292,000.     Patient had IV Injectafer at the end of September and early October.  She reports improved energy level.      Laboratory studies on 12/13/2018 reported normalized hemoglobin at 12.8, MCV 88.0, MCHC 33.4.  She maintains normal platelets at 262,000 and WBC 7670.  Her iron  "study showed significantly improved ferritin 206 ng/mL and iron saturation 18%.  Her vitamin B12 level 264 pg/mL.      On 6/24/2019, the patient complained of recent palpitations, and was seen by a cardiologist, who prescribed her metoprolol 25 mg, however she has not started it yet.      Her laboratory study on 6/24/2019 reported normal hemoglobin 12.4, MCV 87.7, normal platelets 296,000 and WBC 6090.  It also reported recurrent iron deficiency with a ferritin 36.8, and iron saturation 10% free iron 34 TIBC 326.  Slightly improved vitamin B12 level at 404 pg/mL.  she has recurrent iron deficiency, most likely due to her heavy menses.  Patient was not able to tolerate oral iron.     Patient was given Injectafer 2 doses in July 2019.     Patient was given 1 dose of Injectafer in October 2020 due to recurrent iron deficiency.    In December 2020, she reports she had a heavy menses which lasted approximately 18 days.  She did hold her anticoagulation with no improvement in her menses.  She questions her need for iron.  She has attempted to take oral iron though is intolerant with abdominal discomfort.    She reports she has developed progressive neurologic symptoms with a stutter and generalized weakness.  She does regularly follow-up with neurology.  The patient is quite adamant that her symptoms are related to her antiphospholipid syndrome.  She is requesting referral to rheumatology today.    She does continue on Eliquis 5 mg twice daily with good tolerance.  She reports she is still \"clotting\" with intermittent discomfort in her upper and lower extremities which she attributes to blood clots.  Presently, she has no extremity pain, swelling or erythema.  She does continue on B12 supplementation.    She has vitamin D deficiency on 6/3/2021.  Patient reports that she is currently taking 50 mcg vitamin D equipment 2000 units.  I advised patient to take 6000 units every day, with the equivalent of 42,000 units/week.  " She will need this dosage for about 8 weeks.     Venous Doppler study was negative for DVT.  She will continue current Eliquis anticoagulation.    I saw her in June 2021 and she has multiple complaints, including left leg swelling, significant fatigue, cyanosis of fingers and toes, and body aches.  We obtained the laboratory studies for assessment and that she was found to having vitamin D deficiency, despite of taking oral vitamin D 2000 units for about 3 to 4 months.  She also had a negative venous Doppler study of the left leg.  We also obtained lab studies for cryoglobulins and cold agglutinin for assessment of cyanosis.      Her left leg venous Doppler study was negative for DVT.    I spoke with the patient after she received the vitamin D results, and recommended to increase oral vitamin D to 3 tablets a day equivalent to 6000 units a day.  Patient reports this increased dose of vitamin D causes chest discomfort and that wake up in the night.  She actually stopped taking vitamin D and feels better with resolution of symptoms.  But anyway she was recently evaluated by cardiology service, and currently carries a cardiac monitor and will finish exam tomorrow with a further follow-up with cardiology.     Lab study reported no evidence of hypothyroidism.  She has completed normal TSH, free T4 and free T3.  Liver study also negative for cold agglutinin and cryoglobulin.    This patient was found to having iron deficient anemia with ferritin 14.1 ng/mL, iron saturation 6% free iron 24 TIBC 374 and hemoglobin 11.2.  Patient was given 3 doses of Venofer total 900 mg.    Today on 9/1/2022 patient is slightly improved hemoglobin 11.7, normal platelets 275,000 WBC 7270 and neutrophils 4260.  Iron study reported low iron saturation 14% with free iron 43 TIBC 301 and improved ferritin 90.5 ng/mL.      Patient was not able to tolerate oral iron to 20 once a day because of abdomen cramping.  She continues oral vitamin B12  "daily.    Laboratory studies 5/25/2022 reported recurrent iron deficiency with ferritin 14.1 ng/mL iron saturation 6% with free iron 24 TIBC 374, and also hemoglobin 11.2 with normal platelets 316,000 and WBC 5860.    On 3/2/2023, patient has deteriorating hemoglobin 10.6 g/dL, MCV 86.7, normal WBC 6510, and platelets 311,000.  She has deteriorating iron studies with ferritin 6.9 and iron saturation 6%, free iron 24 TIBC 419.     Patient was given a dose of Venofer on 3/15/2023.  She reports back on 3/17/2023 complaining of hypotension and malaise the day after her Venofer treatment.  Blood pressure was 90s/40s on 3/16/2023, and 100/55 3/17/2023.  Patient has been hydrating herself with electrolyte replacement drinks and a feeling better afterwards.  She reports she has experienced those symptoms every time she received IV Venofer treatment.  She also had a similar problem after intravenous Injectafer infusion.    Patient receives Venofer without the Benadryl, she was not tolerating Benadryl previously.  I am wondering whether given her Pepcid 20 mg IV as premedication would ease her symptoms.  I will do just that to try.             Vitals:    08/17/23 1424   BP: 103/66   Pulse: 84   Resp: 16   Temp: 98.2 øF (36.8 øC)   TempSrc: Temporal   SpO2: 100%   Weight: 70.4 kg (155 lb 3.2 oz)   Height: 162 cm (63.78\")   PainSc:   3   PainLoc: Chest           8/17/2023     2:29 PM   Current Status   ECOG score 0     PHYSICAL EXAM:   GENERAL:  Well-developed, well-nourished female, in no acute distress.  Orientated to time place and the people.  SKIN:  Warm, dry without rashes, purpura or petechiae.  HEENT:  Normocephalic.   LYMPHATICS:  No cervical, supraclavicular adenopathy.  CHEST: Normal respiratory effort.  Lungs clear to auscultation. Good airflow.  CARDIAC:  Regular rate and rhythm without murmurs. Normal S1,S2.  ABDOMEN:  Soft, nontender with no organomegaly or masses.  Bowel sounds normal.  EXTREMITIES:  No lower " extremity edema.    RECENT LABS:   Lab Results   Component Value Date    WBC 5.97 08/17/2023    HGB 11.4 (L) 08/17/2023    HCT 34.9 08/17/2023    MCV 88.1 08/17/2023     08/17/2023     Lab Results   Component Value Date    NEUTROABS 3.42 08/17/2023       Lab Results   Component Value Date    IRON 78 08/17/2023    TIBC 359 08/17/2023    FERRITIN 38.20 08/17/2023   Iron saturation 22% 8/17/2023. was 6% on 3/2/2023.     Lab Results   Component Value Date    FOLATE >20.00 05/31/2018     Lab Results   Component Value Date    [id number] 916 12/29/2020             Assessment & Plan      1.  Antiphospholipid antibody syndrome, with significantly anti-phosphatidylserine IgM antibody twice more than 3 months apart.    This patient had symptoms with blurry vision, bilateral, ? Cerebrovascular accident (CVA).  This patient has multiple major complaints, and was thought related to multiple sclerosis, however she was told by 2 different neurologists that the MRI changes did not fit with multiple sclerosis.  Instead it may be more of stroke.  This patient also has intermittent lower extremity edema although no previous evidence of venous thrombosis.   May 2018 significantly elevated antiphosphatidylserine antibody IgM at 57.  We repeated laboratory study on 9/13/2018, she had persistently elevated anti-phosphatidylserine antibody IgM 65.  So she has antiphospholipid syndrome.    She initiated Eliquis 5 mg twice daily mid June 2018.  This resulted in resolution of blurry vision and numbness of her hands  Eliquis was then dose reduced to 2.5 mg twice daily due to heavy menses and recurrent iron deficiency  April 2020, she resumed 5 mg twice daily due to chest tightness and intermittent lower extremity swelling.  Patient did not have confirmed progressive thrombosis at that time  The patient feels quite adamantly presently that her progressive neurologic symptoms are related to antiphospholipid antibody syndrome and request  referral to rheumatology.  This was facilitated in March 2021. She will continue on Eliquis 5 mg twice daily  On 6/3/2021, patient reports she has left leg edema last week.  She feels slightly better today.  Her venous Doppler study was negative for DVT in the left leg on 6/3/2021.  She will continue Eliquis 5 mg twice a day.   On 6/2/2022, patient reports she has persistent intermittent migraine headaches, and vision loss is.  She continues on Eliquis with good compliance.  I agree her symptoms most likely related to ongoing thrombosis.  I discussed with the patient, recommended to switch anticoagulation to Coumadin/warfarin.  We will monitor INR weekly.  This patient and his lifelong anticoagulation.  We will try to get insurance permission for INR meter so she could measure herself at home.    Patient reports that she continues on Eliquis anticoagulation.  Her insurance company will not cover the INR meter until she would come to the clinic for monitoring INR for more than 6 months.  Patient reports she now uses wheelchair, and is not easy for her to come to the clinic frequently.  So she opted to continue Eliquis anticoagulation.   The patient reports on 03/02/2023 that she has been tolerating Eliquis anticoagulation. No recurrent stroke symptoms. She does have some weakness at the times not predictable and recently had one episode of falling due to weakness on the left side. No recurrence recently.  The patient presented today on 08/17/2023 for evaluation. She recently had episode of left upper forearm distended vein, was painful, but it had resolved without problem. She reports compliant with Eliquis every 12 hours typically on the 9 AM and at 9 PM. The patient was asking whether she could have benefited from Plavix. She also asked whether she should be treated with Rituxan since it is autoimmune disease for APS. I discussed with the patient, that I do think we could potentially try Plavix 75 mg daily. We will  hold Rituxan for now until she has thorough evaluation by rheumatologist for possible Sjogren's disease.       2.  Iron deficiency anemia, secondary to heavy menses.    Intolerant to oral iron with significant constipation  We treated her with 2 doses of intravenous iron with Injectofar in end of September and early October 2018.   She had IV iron in late June and early July 2019 because recurrent iron deficiency.    She reports heavy menses.  She did discuss with GYN who recommended ablation, nonhormonal IUD or hysterectomy.  Recurrent iron deficiency anemia with Hb 11.5, ferritin 16.9 and iron saturation 10% on 10/7/2020.  Patient was given Injectafer 1 dose in October 2020.  Patient reports in December 2020 she had a heavy menses, one-time it lasted for 18 days despite holding anticoagulation.  Labs 12/29/2020 with recurrent iron deficiency, ferritin of 42, iron saturation of 9%, hemoglobin 10.6.  No IV iron infusion.  Persistent worsening iron deficiency with ferritin 29, iron saturation 8% on 1/29/2021.  Patient had Injectafer 2 doses in February 2021  Laboratory studies on 4/23/2021 reported much improved iron study with ferritin 400.7 ng/mL, iron saturation 24%, and improved hemoglobin 13.0.  On 6/3/2021, patient maintains normal hemoglobin 13.0.  Iron study reported excellent ferritin 375.8 ng/mL and iron saturation 31% with free iron 82 TIBC 262.   Laboratory study 5/25/2022 reported significant recurrent iron deficiency anemia with ferritin 14.1, iron saturation 6% free iron 24 and TIBC 374. We will request Venofer 300 mg weekly for 3 doses.   Today on 9/1/2022 patient is improved ferritin 90.5 ng/mL, however still low iron saturation 14% free iron 43 TIBC 301.  She also has persistent mild anemia with Hb 11.7.  I recommended patient to be given IV iron for 300 mg for 3 doses.  The patient did not receive intravenous iron therapy in late part of 2022 due to loss of insurance coverage. Today on 03/02/2023,  patient reports she has been taking oral iron once a day, but it causes constipation. She has worsening hemoglobin 10.6. Iron study report significantly worsened iron condition with ferritin 6.9 ng/mL, iron saturation only 6 percent with a free iron 24, TIBC 419. I recommended to give her intravenous iron therapy, Venofer 300 mg weekly for three doses and monitoring iron studies every 3 months.  The patient received the three doses of Venofer in 03/2023. The patient reports she typically has some reaction to the second day, with tingling, pain sensation in the left lower chest wall. She also has some skin itching, but no other symptoms. The patient reports she could not tolerate Benadryl and Pepcid, both of them made or made her tachycardic. She subsequently did well with the reaction and resolved without any problem.   On 08/17/2023, iron studies showed deteriorating ferritin 38.2, however, normal iron saturation 22% with a free iron 78. She is not a candidate for IV iron therapy, however, expecting to happen in probably 4 weeks since significant dropping of ferritin level.  Also because the skin rashes hives after the Venofer infusion, and also chest tightness, she is very concerned for receiving Venofer in the future.       3.  B12 deficiency.    This patient has history of B12 deficiency and was given B12 injection previously.    She continues on oral B12 at 1000 mcg daily with B12 level at 916 pg/mL on 12/29/2020.   Continue oral vitamin B12.     4.  Progressive neurologic symptoms  The patient reports a decline over the past 2 months with unexpected weight loss, lower extremity weakness, development of a stutter  She is following up with neurology related to her multiple sclerosis, who does not feel her symptoms are related to her MS as her most recent MRI was fairly stable  Patient question symptomatology related to antiphospholipid syndrome or other autoimmune etiology, she is requesting referral to  rheumatology at this time.  She reports her neurologist is also recommended referral to rheumatology.  Patient was seen by rheumatologist and started on Plaquenil however was not able to tolerate it due to prolongation of QT interval.  Plaquenil was subsequently discontinued.   Rheumatologist recommended further evaluation at the [facility].  However her insurance company declined coverage.  Today on 08/17/2023, the patient reports stable condition.    5.  Significant chronic fatigue.  On 6/3/2021, patient reports she is worsening chronic fatigue, very significant, she has no energy at all, lies on bed or sitting in couch most the time.    Patient reports she is not depressed at all.  I recommended to obtain comprehensive thyroid profile for assessment.  Patient had a complete normal thyroid profile on 6/3/2021.   On 6/2/2022 patient reports profound fatigue.  This is apparently related to her iron deficiency.  We will treat her with intravenous Venofer.  On 03/02/2023, patient reports worsening fatigue. This is likely due to iron deficiency.  The patient today reports chronical fatigue.    6.  Peripheral cyanosis.  6/3/2021, patient reports her fingers and toes will return to blue color oftentimes despite of taking Eliquis for anticoagulation.  She reports no claudication.    By examination, her finger feels cool to touch.  No signs of ischemia.  I recommended laboratory studies for cold agglutinin and cryoglobulin for assessment.  Laboratory study on 6/3/2021 reported negative results for both cold agglutinin and cryoglobulin.   Symptoms also likely related to hypercoagulable condition.  No changes of her condition as of 9/1/2022.  On 03/02/2023, patient reports persistent cyanosis associated with her toes.  Dry eye syndrome is reported on 08/17/2023. Discussed with the patient, we will refer her to rheumatologist for evaluation. She was previously seen at [facility], however, she is not happy with  the service they provided.      7. The patient is having thickened endometrium and is waiting for D and C on 08/29/2023. The patient asking for preop management of anticoagulation. I recommended to hold the Eliquis 48 hours prior to the procedure. However, due to her history, I recommended bridging with the weight-based Lovenox 1 mg/kg every 12 hours for two doses and then she stop any anticoagulation the day before procedure. Postop, the patient needed to double check with Dr. Pierce to see when to start back on anticoagulation. I do recommend she start back on Lovenox first for a couple of days and if there is no evidence for bleeding, then she can resume Eliquis anticoagulation.      PLAN:    Start Plavix 75 mg daily, after the D&C procedure for endometrium hyperplasia.   Continue Eliquis 5 mg twice a day.   Patient will keep appointment with Dr. Pierce for D&C.  Perioperative anticoagulation as stated above section #7.  I E scribed the Lovenox to her pharmacy.  Patient will continue oral iron once a day.      Repeat iron studies in 4 weeks for ferritin, iron profile, likely will need IV iron infusion at that time. If that happens, we will request permission from insurance company to try to get the permission for Injectafer 750 mg each time for 2 doses.   Continue oral vitamin B12 daily.   We will arrange for the patient to come back to see me in 6 months for reassessment. We will repeat iron studies, CBC.    I spent 42 minutes caring for Sharyn on this date of service. This time includes time spent by me in the following activities: preparing for the visit, reviewing tests, obtaining and/or reviewing a separately obtained history, performing a medically appropriate examination and/or evaluation, counseling and educating the patient/family/caregiver, ordering medications, tests, or procedures, referring and communicating with other health care professionals, documenting information in the medical record,  independently interpreting results and communicating that information with the patient/family/caregiver and care coordination       Johny Burgos MD PhD   08/17/2023          CC:   Deborah Riggins M.D.    Melchor Roque M.D.   Marii Pierce M.D.                  Addendum:          Transcribed from ambient dictation for Johny Burgos MD PhD by Zara Ewing.  08/17/23   16:29 EDT    Patient or patient representative verbalized consent to the visit recording.    I have personally performed the services described in this document as transcribed by the above individual, and it is both accurate and complete.    Johny Burgos MD PhD

## 2023-08-20 ENCOUNTER — PREP FOR SURGERY (OUTPATIENT)
Dept: OTHER | Facility: HOSPITAL | Age: 43
End: 2023-08-20
Payer: COMMERCIAL

## 2023-08-20 DIAGNOSIS — N93.9 ABNORMAL UTERINE BLEEDING (AUB): ICD-10-CM

## 2023-08-20 DIAGNOSIS — D50.9 IRON DEFICIENCY ANEMIA, UNSPECIFIED IRON DEFICIENCY ANEMIA TYPE: Primary | ICD-10-CM

## 2023-08-20 DIAGNOSIS — R93.89 THICKENED ENDOMETRIUM: ICD-10-CM

## 2023-08-20 DIAGNOSIS — Z80.49 FAMILY HISTORY OF UTERINE CANCER: ICD-10-CM

## 2023-08-20 RX ORDER — SODIUM CHLORIDE 9 MG/ML
40 INJECTION, SOLUTION INTRAVENOUS AS NEEDED
OUTPATIENT
Start: 2023-08-20

## 2023-08-20 RX ORDER — SODIUM CHLORIDE 0.9 % (FLUSH) 0.9 %
10 SYRINGE (ML) INJECTION AS NEEDED
OUTPATIENT
Start: 2023-08-20

## 2023-08-20 RX ORDER — SODIUM CHLORIDE 0.9 % (FLUSH) 0.9 %
10 SYRINGE (ML) INJECTION EVERY 12 HOURS SCHEDULED
OUTPATIENT
Start: 2023-08-20

## 2023-08-21 ENCOUNTER — TELEPHONE (OUTPATIENT)
Dept: OBSTETRICS AND GYNECOLOGY | Facility: CLINIC | Age: 43
End: 2023-08-21
Payer: COMMERCIAL

## 2023-08-21 PROBLEM — Z80.49 FAMILY HISTORY OF UTERINE CANCER: Status: ACTIVE | Noted: 2023-08-21

## 2023-08-21 PROBLEM — R93.89 THICKENED ENDOMETRIUM: Status: ACTIVE | Noted: 2023-08-21

## 2023-08-21 PROBLEM — N93.9 ABNORMAL UTERINE BLEEDING (AUB): Status: ACTIVE | Noted: 2023-08-21

## 2023-08-21 NOTE — TELEPHONE ENCOUNTER
Patient called in stating she was scheduled for a H/S D&C on 8/29/23. When she was in the office last you had mentioned not doing the EMBX due to her lining be thick and not knowing where she was at in her cycle. Patient stated she started her period the day after and wants to know if you can do the EMBX instead of the D&C due to $$$. Please advise

## 2023-08-24 ENCOUNTER — TELEPHONE (OUTPATIENT)
Dept: OBSTETRICS AND GYNECOLOGY | Facility: CLINIC | Age: 43
End: 2023-08-24
Payer: COMMERCIAL

## 2023-08-24 NOTE — TELEPHONE ENCOUNTER
I called pt and we discussed the differences b/t endo bx and D&C. She is a better D&C candidate due to the thickness of her lining and family history of uterine cancer, however, the procedure is too expensive and she would like to try an endo bx in the office first. Can you please cancel her case and get her scheduled for an endo bx in the office in the next 2 weeks? Thanks ROMINA

## 2023-09-14 ENCOUNTER — LAB (OUTPATIENT)
Dept: OTHER | Facility: HOSPITAL | Age: 43
End: 2023-09-14
Payer: COMMERCIAL

## 2023-09-14 ENCOUNTER — CLINICAL SUPPORT (OUTPATIENT)
Dept: ONCOLOGY | Facility: HOSPITAL | Age: 43
End: 2023-09-14
Payer: COMMERCIAL

## 2023-09-14 DIAGNOSIS — D50.0 IRON DEFICIENCY ANEMIA DUE TO CHRONIC BLOOD LOSS: ICD-10-CM

## 2023-09-14 DIAGNOSIS — T45.4X5A ADVERSE EFFECT OF IRON, INITIAL ENCOUNTER: ICD-10-CM

## 2023-09-14 LAB
BASOPHILS # BLD AUTO: 0.04 10*3/MM3 (ref 0–0.2)
BASOPHILS NFR BLD AUTO: 0.4 % (ref 0–1.5)
DEPRECATED RDW RBC AUTO: 39.5 FL (ref 37–54)
EOSINOPHIL # BLD AUTO: 0.13 10*3/MM3 (ref 0–0.4)
EOSINOPHIL NFR BLD AUTO: 1.4 % (ref 0.3–6.2)
ERYTHROCYTE [DISTWIDTH] IN BLOOD BY AUTOMATED COUNT: 12.4 % (ref 12.3–15.4)
FERRITIN SERPL-MCNC: 16.6 NG/ML (ref 13–150)
HCT VFR BLD AUTO: 35.8 % (ref 34–46.6)
HGB BLD-MCNC: 11.5 G/DL (ref 12–15.9)
IMM GRANULOCYTES # BLD AUTO: 0.02 10*3/MM3 (ref 0–0.05)
IMM GRANULOCYTES NFR BLD AUTO: 0.2 % (ref 0–0.5)
IRON 24H UR-MRATE: 43 MCG/DL (ref 37–145)
IRON SATN MFR SERPL: 12 % (ref 20–50)
LYMPHOCYTES # BLD AUTO: 2.44 10*3/MM3 (ref 0.7–3.1)
LYMPHOCYTES NFR BLD AUTO: 25.7 % (ref 19.6–45.3)
MCH RBC QN AUTO: 28 PG (ref 26.6–33)
MCHC RBC AUTO-ENTMCNC: 32.1 G/DL (ref 31.5–35.7)
MCV RBC AUTO: 87.3 FL (ref 79–97)
MONOCYTES # BLD AUTO: 0.53 10*3/MM3 (ref 0.1–0.9)
MONOCYTES NFR BLD AUTO: 5.6 % (ref 5–12)
NEUTROPHILS NFR BLD AUTO: 6.32 10*3/MM3 (ref 1.7–7)
NEUTROPHILS NFR BLD AUTO: 66.7 % (ref 42.7–76)
NRBC BLD AUTO-RTO: 0 /100 WBC (ref 0–0.2)
PLATELET # BLD AUTO: 349 10*3/MM3 (ref 140–450)
PMV BLD AUTO: 9.3 FL (ref 6–12)
RBC # BLD AUTO: 4.1 10*6/MM3 (ref 3.77–5.28)
TIBC SERPL-MCNC: 359 MCG/DL (ref 298–536)
TRANSFERRIN SERPL-MCNC: 241 MG/DL (ref 200–360)
WBC NRBC COR # BLD: 9.48 10*3/MM3 (ref 3.4–10.8)

## 2023-09-14 PROCEDURE — 36415 COLL VENOUS BLD VENIPUNCTURE: CPT

## 2023-09-14 PROCEDURE — G0463 HOSPITAL OUTPT CLINIC VISIT: HCPCS

## 2023-09-14 PROCEDURE — 82728 ASSAY OF FERRITIN: CPT | Performed by: INTERNAL MEDICINE

## 2023-09-14 PROCEDURE — 84466 ASSAY OF TRANSFERRIN: CPT | Performed by: INTERNAL MEDICINE

## 2023-09-14 PROCEDURE — 85025 COMPLETE CBC W/AUTO DIFF WBC: CPT | Performed by: INTERNAL MEDICINE

## 2023-09-14 PROCEDURE — 83540 ASSAY OF IRON: CPT | Performed by: INTERNAL MEDICINE

## 2023-09-14 NOTE — NURSING NOTE
Patient is here for lab review with RN.  CBC reviewed, counts are stable for this patient at this time. Patient has no complaints. Copy of labs given to patient and follow up appointment reviewed. Patient is instructed to call the office with any concerns or new symptoms prior to next visit. Patient verbalized understanding and discharged in stable condition.  Lab Results   Component Value Date    WBC 9.48 09/14/2023    HGB 11.5 (L) 09/14/2023    HCT 35.8 09/14/2023    MCV 87.3 09/14/2023     09/14/2023     Lab Results   Component Value Date    NEUTROABS 6.32 09/14/2023     Pt did not remain for results of iron labs. Message sent to Dr. Burgos and clinic RN, Elyse for further instruction to be given to pt if warranted.   Lab Results   Component Value Date    IRON 43 09/14/2023    TIBC 359 09/14/2023    FERRITIN 16.60 09/14/2023     Iron saturation 12%

## 2023-09-15 ENCOUNTER — TELEPHONE (OUTPATIENT)
Dept: CARDIOLOGY | Facility: CLINIC | Age: 43
End: 2023-09-15
Payer: COMMERCIAL

## 2023-09-15 DIAGNOSIS — R55 VASOVAGAL SYMPTOM: Primary | ICD-10-CM

## 2023-09-15 DIAGNOSIS — R00.2 PALPITATIONS: ICD-10-CM

## 2023-09-15 NOTE — TELEPHONE ENCOUNTER
Pt left a VM stating MK[facility] orders her compression stockings every year for orthostatic tachycardia and she is need of another prescription.

## 2023-09-18 ENCOUNTER — TELEPHONE (OUTPATIENT)
Dept: ONCOLOGY | Facility: CLINIC | Age: 43
End: 2023-09-18
Payer: COMMERCIAL

## 2023-09-18 NOTE — TELEPHONE ENCOUNTER
Per request from Dr Burgos patient called and reviewed Ferritin 16.6 and Iron Sat 12 results. Patient states she knows she needs to receive IV Venofer but patient verbalizes having a rash/hives post treatment. Informed patient will speak with Dr Burgos about possibly requesting insurance approval for Injectafer due to Venofer causing rash/hives and will call patient with update.    Patient v/u

## 2023-09-18 NOTE — TELEPHONE ENCOUNTER
She has not been seen in over a year (last 3/2022), and canceled her last appointment without rescheduling. I entered order for her compression stockings. She needs to be seen. I believe [facility].

## 2023-09-18 NOTE — TELEPHONE ENCOUNTER
----- Message from Johny Burgos MD PhD sent at 9/15/2023  4:25 PM EDT -----  Regarding: RE: Iron labs  Thank you very much Valerie!    Elyse, would you please call her Monday to see if she wants to have IV iron? Thank you !    Aubrey      ----- Message -----  From: Valerie Galicia, KAVON  Sent: 9/14/2023   3:43 PM EDT  To: Johny Burgos MD PhD, Elyse Guan, RN  Subject: Iron labs                                        Hi, Dr. Burgos,    Ms. Jones was in today for labs with review. She did not stay for the results of her iron labs. It looks like her iron sat is 12%, ferritin 16.6. I told her we would reach out with any further instruction. Her next appointment is Dec 7th with labs and RN Review.    Elyse, I am off tomorrow in the event we do need to reach out to her.    Thank you,    Valerie

## 2023-09-19 ENCOUNTER — TELEPHONE (OUTPATIENT)
Dept: ONCOLOGY | Facility: CLINIC | Age: 43
End: 2023-09-19
Payer: COMMERCIAL

## 2023-09-19 ENCOUNTER — TELEPHONE (OUTPATIENT)
Dept: ONCOLOGY | Facility: CLINIC | Age: 43
End: 2023-09-19

## 2023-09-19 PROBLEM — T45.4X5A ADVERSE REACTION TO COMPOUND IRON PREPARATION, INITIAL ENCOUNTER: Status: ACTIVE | Noted: 2018-09-20

## 2023-09-19 NOTE — TELEPHONE ENCOUNTER
Per Dr Burgos will try to have IV Injectafer times 2 treatments due to IV Venofer causing rash/hives. Message sent to the appointment desk to schedule.  Patient v/u

## 2023-09-19 NOTE — TELEPHONE ENCOUNTER
----- Message from Elyse Guan RN sent at 9/19/2023  9:46 AM EDT -----  Regarding: IV Injectafer  Per Dr Burgos patient needs to be scheduled for IV Injectafer times 2. Thanks

## 2023-09-19 NOTE — TELEPHONE ENCOUNTER
Caller: Sharyn Jones    Relationship to patient: Self    Best call back number: [phone]    Type of visit: INFUSION    Requested date: PLEASE CALL TO RESCHEDULE.     If rescheduling, when is the original appointment: 10/03

## 2023-09-22 ENCOUNTER — TELEPHONE (OUTPATIENT)
Dept: ONCOLOGY | Facility: CLINIC | Age: 43
End: 2023-09-22
Payer: COMMERCIAL

## 2023-09-22 NOTE — TELEPHONE ENCOUNTER
Attempted to call no answer left message.  Patient's insurance has approved Injectafer times 2 treatments.  
DISPLAY PLAN FREE TEXT

## 2023-09-26 ENCOUNTER — INFUSION (OUTPATIENT)
Dept: ONCOLOGY | Facility: HOSPITAL | Age: 43
End: 2023-09-26
Payer: COMMERCIAL

## 2023-09-26 VITALS
HEIGHT: 64 IN | WEIGHT: 158 LBS | SYSTOLIC BLOOD PRESSURE: 99 MMHG | RESPIRATION RATE: 16 BRPM | DIASTOLIC BLOOD PRESSURE: 63 MMHG | HEART RATE: 76 BPM | BODY MASS INDEX: 26.98 KG/M2 | OXYGEN SATURATION: 100 % | TEMPERATURE: 97.6 F

## 2023-09-26 DIAGNOSIS — D50.9 IRON DEFICIENCY ANEMIA, UNSPECIFIED IRON DEFICIENCY ANEMIA TYPE: ICD-10-CM

## 2023-09-26 DIAGNOSIS — T45.4X5A ADVERSE REACTION TO COMPOUND IRON PREPARATION, INITIAL ENCOUNTER: Primary | ICD-10-CM

## 2023-09-26 PROCEDURE — 25010000002 FERRIC CARBOXYMALTOSE 750 MG/15ML SOLUTION: Performed by: NURSE PRACTITIONER

## 2023-09-26 PROCEDURE — 96374 THER/PROPH/DIAG INJ IV PUSH: CPT

## 2023-09-26 RX ORDER — SODIUM CHLORIDE 9 MG/ML
250 INJECTION, SOLUTION INTRAVENOUS ONCE
Status: COMPLETED | OUTPATIENT
Start: 2023-09-26 | End: 2023-09-26

## 2023-09-26 RX ADMIN — SODIUM CHLORIDE 250 ML: 9 INJECTION, SOLUTION INTRAVENOUS at 08:13

## 2023-09-26 RX ADMIN — FERRIC CARBOXYMALTOSE INJECTION 750 MG: 50 INJECTION, SOLUTION INTRAVENOUS at 08:13

## 2023-09-26 NOTE — NURSING NOTE
Pt arrived for Injectafer infusion and states she took tylenol at home prior to arrival. Pt declines pre med compazine; explained importance and rationale of compazine but pt still declines. Instructed pt to call provider if she experiences any concerning symptoms and to seek medical attention for any immediate needs after discharge. Pt verbalized understanding. Tolerated today's infusion well, observed for 30 minutes post infusion and discharged in stable condition.

## 2023-09-29 ENCOUNTER — TELEPHONE (OUTPATIENT)
Dept: ONCOLOGY | Facility: CLINIC | Age: 43
End: 2023-09-29
Payer: COMMERCIAL

## 2023-09-29 RX ORDER — METHYLPREDNISOLONE 4 MG/1
TABLET ORAL
Qty: 21 TABLET | Refills: 0 | Status: SHIPPED | OUTPATIENT
Start: 2023-09-29

## 2023-09-29 NOTE — TELEPHONE ENCOUNTER
Provider: Aubrey  Caller: patient  Relationship to Patient: self  Call Back Phone Number: [phone]  Reason for Call: Pt has been feeling nauseous and flu like symptoms Also having headaches as well.Pt has been taking Tylenol but not helping. She had an iron infusion recently .

## 2023-09-29 NOTE — TELEPHONE ENCOUNTER
Called the patient after talking with Dr. Burgos about her call in and explaining I was not able to talk with her. He wanted a medrol dose cornell ordered and this was sent and I left a v/m explaining this.

## 2023-10-04 ENCOUNTER — OFFICE VISIT (OUTPATIENT)
Dept: OBSTETRICS AND GYNECOLOGY | Facility: CLINIC | Age: 43
End: 2023-10-04
Payer: COMMERCIAL

## 2023-10-04 VITALS
WEIGHT: 157.2 LBS | BODY MASS INDEX: 26.84 KG/M2 | SYSTOLIC BLOOD PRESSURE: 118 MMHG | DIASTOLIC BLOOD PRESSURE: 72 MMHG | HEIGHT: 64 IN

## 2023-10-04 DIAGNOSIS — Z13.89 SCREENING FOR GENITOURINARY CONDITION: Primary | ICD-10-CM

## 2023-10-04 DIAGNOSIS — D68.59 HYPERCOAGULATION SYNDROME: ICD-10-CM

## 2023-10-04 DIAGNOSIS — R93.89 THICKENED ENDOMETRIUM: ICD-10-CM

## 2023-10-04 DIAGNOSIS — Z80.49 FAMILY HISTORY OF UTERINE CANCER: ICD-10-CM

## 2023-10-04 DIAGNOSIS — N93.9 ABNORMAL UTERINE BLEEDING (AUB): ICD-10-CM

## 2023-10-04 LAB
B-HCG UR QL: NEGATIVE
BILIRUB BLD-MCNC: NEGATIVE MG/DL
CLARITY, POC: CLEAR
COLOR UR: YELLOW
EXPIRATION DATE: NORMAL
GLUCOSE UR STRIP-MCNC: NEGATIVE MG/DL
INTERNAL NEGATIVE CONTROL: NORMAL
INTERNAL POSITIVE CONTROL: NORMAL
KETONES UR QL: NEGATIVE
LEUKOCYTE EST, POC: NEGATIVE
Lab: NORMAL
NITRITE UR-MCNC: NEGATIVE MG/ML
PH UR: 5 [PH] (ref 5–8)
PROT UR STRIP-MCNC: NEGATIVE MG/DL
RBC # UR STRIP: ABNORMAL /UL
SP GR UR: 1 (ref 1–1.03)
UROBILINOGEN UR QL: NORMAL
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
## 2023-10-05 ENCOUNTER — INFUSION (OUTPATIENT)
Dept: ONCOLOGY | Facility: HOSPITAL | Age: 43
End: 2023-10-05
Payer: COMMERCIAL

## 2023-10-05 VITALS
WEIGHT: 158.4 LBS | SYSTOLIC BLOOD PRESSURE: 110 MMHG | OXYGEN SATURATION: 100 % | HEIGHT: 64 IN | DIASTOLIC BLOOD PRESSURE: 72 MMHG | RESPIRATION RATE: 16 BRPM | HEART RATE: 79 BPM | BODY MASS INDEX: 27.04 KG/M2 | TEMPERATURE: 98.6 F

## 2023-10-05 DIAGNOSIS — T45.4X5A ADVERSE REACTION TO COMPOUND IRON PREPARATION, INITIAL ENCOUNTER: Primary | ICD-10-CM

## 2023-10-05 DIAGNOSIS — D50.9 IRON DEFICIENCY ANEMIA, UNSPECIFIED IRON DEFICIENCY ANEMIA TYPE: ICD-10-CM

## 2023-10-05 PROCEDURE — 25810000003 SODIUM CHLORIDE 0.9 % SOLUTION: Performed by: NURSE PRACTITIONER

## 2023-10-05 PROCEDURE — 25010000002 FERRIC CARBOXYMALTOSE 750 MG/15ML SOLUTION 15 ML VIAL: Performed by: NURSE PRACTITIONER

## 2023-10-05 PROCEDURE — 96374 THER/PROPH/DIAG INJ IV PUSH: CPT

## 2023-10-05 RX ORDER — SODIUM CHLORIDE 9 MG/ML
250 INJECTION, SOLUTION INTRAVENOUS ONCE
Status: COMPLETED | OUTPATIENT
Start: 2023-10-05 | End: 2023-10-05

## 2023-10-05 RX ADMIN — FERRIC CARBOXYMALTOSE INJECTION 750 MG: 50 INJECTION, SOLUTION INTRAVENOUS at 08:12

## 2023-10-05 RX ADMIN — SODIUM CHLORIDE 250 ML: 9 INJECTION, SOLUTION INTRAVENOUS at 08:12

## 2023-10-05 NOTE — NURSING NOTE
Pt reports feeling flu like symptoms at home after receiving her last injectafer infusion but states symptoms resolved after taking one dose of steroid.  Pt continues to decline compazine pre med but states she took tylenol prior to arrival. Tolerated today's infusion without complaints and discharged in stable condition.

## 2023-10-09 LAB
DX ICD CODE: NORMAL
PATH REPORT.FINAL DX SPEC: NORMAL
PATH REPORT.GROSS SPEC: NORMAL
PATH REPORT.SITE OF ORIGIN SPEC: NORMAL
PATHOLOGIST NAME: NORMAL
PAYMENT PROCEDURE: NORMAL

## 2023-11-13 RX ORDER — APIXABAN 5 MG/1
TABLET, FILM COATED ORAL
Qty: 180 TABLET | Refills: 0 | Status: SHIPPED | OUTPATIENT
Start: 2023-11-13

## 2023-12-07 ENCOUNTER — CLINICAL SUPPORT (OUTPATIENT)
Dept: ONCOLOGY | Facility: HOSPITAL | Age: 43
End: 2023-12-07
Payer: COMMERCIAL

## 2023-12-07 ENCOUNTER — LAB (OUTPATIENT)
Dept: OTHER | Facility: HOSPITAL | Age: 43
End: 2023-12-07
Payer: COMMERCIAL

## 2023-12-07 DIAGNOSIS — T45.4X5A ADVERSE EFFECT OF IRON, INITIAL ENCOUNTER: ICD-10-CM

## 2023-12-07 DIAGNOSIS — D50.0 IRON DEFICIENCY ANEMIA DUE TO CHRONIC BLOOD LOSS: ICD-10-CM

## 2023-12-07 LAB
BASOPHILS # BLD AUTO: 0.04 10*3/MM3 (ref 0–0.2)
BASOPHILS NFR BLD AUTO: 0.5 % (ref 0–1.5)
DEPRECATED RDW RBC AUTO: 41 FL (ref 37–54)
EOSINOPHIL # BLD AUTO: 0.13 10*3/MM3 (ref 0–0.4)
EOSINOPHIL NFR BLD AUTO: 1.7 % (ref 0.3–6.2)
ERYTHROCYTE [DISTWIDTH] IN BLOOD BY AUTOMATED COUNT: 12.5 % (ref 12.3–15.4)
FERRITIN SERPL-MCNC: 302.7 NG/ML (ref 13–150)
HCT VFR BLD AUTO: 38.1 % (ref 34–46.6)
HGB BLD-MCNC: 12.3 G/DL (ref 12–15.9)
IMM GRANULOCYTES # BLD AUTO: 0.02 10*3/MM3 (ref 0–0.05)
IMM GRANULOCYTES NFR BLD AUTO: 0.3 % (ref 0–0.5)
IRON 24H UR-MRATE: 84 MCG/DL (ref 37–145)
IRON SATN MFR SERPL: 31 % (ref 20–50)
LYMPHOCYTES # BLD AUTO: 2.25 10*3/MM3 (ref 0.7–3.1)
LYMPHOCYTES NFR BLD AUTO: 30.3 % (ref 19.6–45.3)
MCH RBC QN AUTO: 29.4 PG (ref 26.6–33)
MCHC RBC AUTO-ENTMCNC: 32.3 G/DL (ref 31.5–35.7)
MCV RBC AUTO: 90.9 FL (ref 79–97)
MONOCYTES # BLD AUTO: 0.37 10*3/MM3 (ref 0.1–0.9)
MONOCYTES NFR BLD AUTO: 5 % (ref 5–12)
NEUTROPHILS NFR BLD AUTO: 4.62 10*3/MM3 (ref 1.7–7)
NEUTROPHILS NFR BLD AUTO: 62.2 % (ref 42.7–76)
NRBC BLD AUTO-RTO: 0 /100 WBC (ref 0–0.2)
PLATELET # BLD AUTO: 332 10*3/MM3 (ref 140–450)
PMV BLD AUTO: 9 FL (ref 6–12)
RBC # BLD AUTO: 4.19 10*6/MM3 (ref 3.77–5.28)
TIBC SERPL-MCNC: 268 MCG/DL (ref 298–536)
TRANSFERRIN SERPL-MCNC: 180 MG/DL (ref 200–360)
WBC NRBC COR # BLD AUTO: 7.43 10*3/MM3 (ref 3.4–10.8)

## 2023-12-07 PROCEDURE — 83540 ASSAY OF IRON: CPT | Performed by: INTERNAL MEDICINE

## 2023-12-07 PROCEDURE — 82728 ASSAY OF FERRITIN: CPT | Performed by: INTERNAL MEDICINE

## 2023-12-07 PROCEDURE — 85025 COMPLETE CBC W/AUTO DIFF WBC: CPT | Performed by: INTERNAL MEDICINE

## 2023-12-07 PROCEDURE — 36415 COLL VENOUS BLD VENIPUNCTURE: CPT

## 2023-12-07 PROCEDURE — 84466 ASSAY OF TRANSFERRIN: CPT | Performed by: INTERNAL MEDICINE

## 2023-12-07 NOTE — NURSING NOTE
Patient discharged after lab draw, prior to rn reviewing labs with patient. CBC reviewed counts are stable for this patient at this time. Called patient and reviewed labs. Patient has no complaints. Follow up appointment reviewed. Patient is instructed to call the office with any concerns or new symptoms prior to next visit. Patient verbalized understanding.

## 2024-02-27 NOTE — PROGRESS NOTES
.   REASON FOR FOLLOW UP:     1.  Suspected episodes of strokes.   Laboratory study on 5/31/2018 reported significantly elevated anti-phosphatidylserine IgM at 57, and marginally elevated anticardiolipin IgM 14.  All others were negative.    Repeated laboratory study on 9/13/2018 showed persistently significantly elevated anti-phosphatidylserine IgM at 65.    2.  Anemia. Laboratory tests confirmed vitamin B12 deficiency and iron deficiency on 5/31/2018.    She also has persistent significant iron deficiency and vitamin B12 deficiency on 9/13/2018.    Patient was not able to tolerate oral iron due to significant constipation, she was given 2 doses Injectafer in end of September 2018, with significant improved iron panel and normalization of hemoglobin in December 2018.    Patient had recurrent iron deficiency in June 2019 due to heavy menses.  Patient was given Injectafer weekly for 2 doses.  Her vitamin B12 level improved at 404 on 6/24/2019.     Recurrent iron deficiency anemia, 2 doses of Injectafer in February 2021.    Patient now taking vitamin B12 supplement with great tolerance.   3.  Vitamin D deficiency confirmed on 6/3/2021.         HISTORY OF PRESENT ILLNESS:  The patient is a 44 y.o. female with the above-mentioned history, who returns today for lab review and follow-up.  She last received IV iron October 2023.  She had previous skin rash/reaction with Venofer, so she received Injectafer in October 2023 in the form of iron replacement.  She did experience mild flulike symptoms following her iron infusion, was prescribed a Medrol Dosepak and symptoms resolved after 1 dose of steroids.  Today she reports that she also experienced swelling to the bilateral legs and arms along with her face after her iron infusion in October 2023 (Injectafer).  Swelling improved on its own over time, but did last several weeks.  She denies any new or worsening shortness of breath or difficulty breathing during that  time.    She does admit that she canceled her procedure for D&C, and has not yet rescheduled this.  She also decided not to start Plavix, does continue on her Eliquis twice daily and is compliant with her Eliquis.  She continues with fatigue, dizzy/lightheadedness, and shortness of breath which she reports are her baseline and have remained stable.  She also discontinued her oral iron.  She denies any signs or symptoms of bleeding.  She has no new concerns today.      HEMATOLOGIC/ONCOLOGIC HISTORY:  The patient is a 38 y.o. year old female who is here for initial evaluation with the above-mentioned history on 5/31/2018.    Patient presented for evaluation reported by her primary care physician Dr. Bravo because of recurrent episodes of blurry visions.  Patient also has abnormal brain MRI examination on multiple occasions.  She was origin of diagnosis of multiple sclerosis, however was told by urologist Dr. Khanna that she did not have multiple sclerosis.  Most recently patient was seen by a different neurologist Dr. Scott that her MRI images did not fit with typical MS changes, instead it may be changes related to thrombotic stroke.  This patient has chronic migraine headache, and was taking Topamax low-dose with good results but with side effects and the medicine was recently changed to Trokendl XR.     This patient also has history of iron deficiency for which she has been taking oral iron for many years.  She also had vitamin B12 deficiency.  Her laboratory study on 11/20/2017 clearly reported iron deficiency with ferritin 8 ng/mL, and she was already mildly anemic with hemoglobin 11.3, and MCV 82.9. She had a normal WBC 6700 including neutrophils 3850 and lymphocytes 2190, monocytes 420.  Her platelets was 323,000.     Laboratory study on 3/23/2018 reported hemoglobin 11.6, MCV 83.1 MCHC 33.6.  Her WBC was 7900 including neutrophils 5240, lymphocytes 1980, monocytes 500.  Her platelets were 341,000.  Her iron  studies reported free iron 106 TIBC 365 and iron saturation 29%, vitamin B12 at 242 pg/mL and vitamin D 20 ng/mL.  There was no ferritin level nor folic acid level.      Laboratory studies we obtained on 05/31/2018 showed significantly elevated antiphosphatidylserine antibody IgM subtype at 57, but negative for IgG and IgA subtype.  She is also marginally positive for anticardiolipin IgM at 14, but negative for IgG subtype.  She was tested negative for other hypercoagulable tests including anti-Beta-2 glycoprotein 1 antibodies, negative for lupus anticoagulant and normal antithrombin 103%, normal protein C activity 117%, normal protein S activity 81% with free protein S antigen 90%.  She is also negative for factor II mutation and factor V Leiden mutation.  We also checked her for comprehensive rheumatoid panel which is all negative as well as negative for the rheumatoid factor and direct WANG.  She also had iron deficiency, ferritin 12.5, iron saturation 12% 05/31/2018.     The patient presented on 6/14/2018 for reevaluation and to discuss laboratory results obtained on 05/31/2018 when I saw her for the 1st time for evaluation of possible thrombosis as well as persistent anemia.  The patient reports no changes in her clinical condition the past 6 days.  No recent episodes of blurry vision.  She is on low-dose aspirin 81 mg daily.  The patient also has been on low-dose oral iron 28 mg daily with good compliance.  She was not able to tolerate the large dose of ferrous sulfate at 65 mg tablets because of significant constipation.  The patient is not really compliant with oral vitamin B12 supplementation.    We started patient on Eliquis 5 mg twice a day on 6/14/2018.  This patient is likely having antiphospholipid antibody syndrome.    In September 2018, patient reports she has no recurrence of blurry vision, no headaches and dizziness since she started on Eliquis 5 mg twice a day in middle June 2018.  She feels more  fit.  Her  also reports patient is more energetic in the past 3 months.  Patient reports no easy bleeding or bruising.  She does note somewhat increased volume of her menses.    Patient reports she was not able to tolerate even low-dose oral iron with significant constipation.  She also reports vitamin B12 causes palpitation.  So currently she is not taking oral iron or vitamin B-12.      Laboratory results on 9/13/2018 reported persistently elevated anti-phosphatidylserine IgM at 65, however resolution of mildly elevated anticardiolipin IgM antibody.  She has persistent iron deficiency with ferritin 8 ng/mL, iron saturation 7%, both of which actually worse than those levels on 5/31/2018.  Her B12 level also is low at 236 pg/mL.  She has worsening anemia with hemoglobin 11.2 but maintains normal WBC 6500 and platelets 292,000.     Patient had IV Injectafer at the end of September and early October.  She reports improved energy level.      Laboratory studies on 12/13/2018 reported normalized hemoglobin at 12.8, MCV 88.0, MCHC 33.4.  She maintains normal platelets at 262,000 and WBC 7670.  Her iron study showed significantly improved ferritin 206 ng/mL and iron saturation 18%.  Her vitamin B12 level 264 pg/mL.      On 6/24/2019, the patient complained of recent palpitations, and was seen by a cardiologist, who prescribed her metoprolol 25 mg, however she has not started it yet.      Her laboratory study on 6/24/2019 reported normal hemoglobin 12.4, MCV 87.7, normal platelets 296,000 and WBC 6090.  It also reported recurrent iron deficiency with a ferritin 36.8, and iron saturation 10% free iron 34 TIBC 326.  Slightly improved vitamin B12 level at 404 pg/mL.  she has recurrent iron deficiency, most likely due to her heavy menses.  Patient was not able to tolerate oral iron.     Patient was given Injectafer 2 doses in July 2019.     Patient was given 1 dose of Injectafer in October 2020 due to recurrent iron  "deficiency.    In December 2020, she reports she had a heavy menses which lasted approximately 18 days.  She did hold her anticoagulation with no improvement in her menses.  She questions her need for iron.  She has attempted to take oral iron though is intolerant with abdominal discomfort.    She reports she has developed progressive neurologic symptoms with a stutter and generalized weakness.  She does regularly follow-up with neurology.  The patient is quite adamant that her symptoms are related to her antiphospholipid syndrome.  She is requesting referral to rheumatology today.    She does continue on Eliquis 5 mg twice daily with good tolerance.  She reports she is still \"clotting\" with intermittent discomfort in her upper and lower extremities which she attributes to blood clots.  Presently, she has no extremity pain, swelling or erythema.  She does continue on B12 supplementation.    She has vitamin D deficiency on 6/3/2021.  Patient reports that she is currently taking 50 mcg vitamin D equipment 2000 units.  I advised patient to take 6000 units every day, with the equivalent of 42,000 units/week.  She will need this dosage for about 8 weeks.     Venous Doppler study was negative for DVT.  She will continue current Eliquis anticoagulation.    I saw her in June 2021 and she has multiple complaints, including left leg swelling, significant fatigue, cyanosis of fingers and toes, and body aches.  We obtained the laboratory studies for assessment and that she was found to having vitamin D deficiency, despite of taking oral vitamin D 2000 units for about 3 to 4 months.  She also had a negative venous Doppler study of the left leg.  We also obtained lab studies for cryoglobulins and cold agglutinin for assessment of cyanosis.      Her left leg venous Doppler study was negative for DVT.    I spoke with the patient after she received the vitamin D results, and recommended to increase oral vitamin D to 3 tablets a day " equivalent to 6000 units a day.  Patient reports this increased dose of vitamin D causes chest discomfort and that wake up in the night.  She actually stopped taking vitamin D and feels better with resolution of symptoms.  But anyway she was recently evaluated by cardiology service, and currently carries a cardiac monitor and will finish exam tomorrow with a further follow-up with cardiology.     Lab study reported no evidence of hypothyroidism.  She has completed normal TSH, free T4 and free T3.  Liver study also negative for cold agglutinin and cryoglobulin.    This patient was found to having iron deficient anemia with ferritin 14.1 ng/mL, iron saturation 6% free iron 24 TIBC 374 and hemoglobin 11.2.  Patient was given 3 doses of Venofer total 900 mg.    Today on 9/1/2022 patient is slightly improved hemoglobin 11.7, normal platelets 275,000 WBC 7270 and neutrophils 4260.  Iron study reported low iron saturation 14% with free iron 43 TIBC 301 and improved ferritin 90.5 ng/mL.      Patient was not able to tolerate oral iron to 20 once a day because of abdomen cramping.  She continues oral vitamin B12 daily.    Laboratory studies 5/25/2022 reported recurrent iron deficiency with ferritin 14.1 ng/mL iron saturation 6% with free iron 24 TIBC 374, and also hemoglobin 11.2 with normal platelets 316,000 and WBC 5860.    On 3/2/2023, patient has deteriorating hemoglobin 10.6 g/dL, MCV 86.7, normal WBC 6510, and platelets 311,000.  She has deteriorating iron studies with ferritin 6.9 and iron saturation 6%, free iron 24 TIBC 419.     Patient was given a dose of Venofer on 3/15/2023.  She reports back on 3/17/2023 complaining of hypotension and malaise the day after her Venofer treatment.  Blood pressure was 90s/40s on 3/16/2023, and 100/55 3/17/2023.  Patient has been hydrating herself with electrolyte replacement drinks and a feeling better afterwards.  She reports she has experienced those symptoms every time she  "received IV Venofer treatment.  She also had a similar problem after intravenous Injectafer infusion.    Patient receives Venofer without the Benadryl, she was not tolerating Benadryl previously.  I am wondering whether given her Pepcid 20 mg IV as premedication would ease her symptoms.  I will do just that to try.             Vitals:    02/29/24 1434   BP: 111/74   Pulse: 83   Resp: 16   Temp: 97.3 °F (36.3 °C)   TempSrc: Temporal   SpO2: 99%   Weight: 73.9 kg (163 lb)   Height: 162 cm (63.78\")   PainSc: 0-No pain             2/29/2024     2:36 PM   Current Status   ECOG score 1     PHYSICAL EXAM:   GENERAL:  Well-developed, well-nourished female, in no acute distress.  Orientated to time place and the people.  SKIN:  Warm, dry without rashes, purpura or petechiae.  HEENT:  Normocephalic.   LYMPHATICS:  No cervical, supraclavicular adenopathy.  CHEST: Normal respiratory effort.  Lungs clear to auscultation. Good airflow.  CARDIAC:  Regular rate and rhythm without murmurs. Normal S1,S2.  ABDOMEN:  Soft, nontender with no organomegaly or masses.  Bowel sounds normal.  EXTREMITIES:  No lower extremity edema.    RECENT LABS:   Lab Results   Component Value Date    WBC 7.82 02/29/2024    HGB 12.0 02/29/2024    HCT 37.2 02/29/2024    MCV 88.2 02/29/2024     02/29/2024     Lab Results   Component Value Date    NEUTROABS 4.85 02/29/2024       Lab Results   Component Value Date    IRON 84 12/07/2023    TIBC 268 (L) 12/07/2023    FERRITIN 302.70 (H) 12/07/2023   Iron saturation 22% 8/17/2023. was 6% on 3/2/2023.     Lab Results   Component Value Date    FOLATE >20.00 05/31/2018     Lab Results   Component Value Date    [id number] 916 12/29/2020             Assessment & Plan      1.  Antiphospholipid antibody syndrome, with significantly anti-phosphatidylserine IgM antibody twice more than 3 months apart.    This patient had symptoms with blurry vision, bilateral, ? Cerebrovascular accident (CVA).  This patient has " multiple major complaints, and was thought related to multiple sclerosis, however she was told by 2 different neurologists that the MRI changes did not fit with multiple sclerosis.  Instead it may be more of stroke.  This patient also has intermittent lower extremity edema although no previous evidence of venous thrombosis.   May 2018 significantly elevated antiphosphatidylserine antibody IgM at 57.  We repeated laboratory study on 9/13/2018, she had persistently elevated anti-phosphatidylserine antibody IgM 65.  So she has antiphospholipid syndrome.    She initiated Eliquis 5 mg twice daily mid June 2018.  This resulted in resolution of blurry vision and numbness of her hands  Eliquis was then dose reduced to 2.5 mg twice daily due to heavy menses and recurrent iron deficiency  April 2020, she resumed 5 mg twice daily due to chest tightness and intermittent lower extremity swelling.  Patient did not have confirmed progressive thrombosis at that time  The patient feels quite adamantly presently that her progressive neurologic symptoms are related to antiphospholipid antibody syndrome and request referral to rheumatology.  This was facilitated in March 2021. She will continue on Eliquis 5 mg twice daily  On 6/3/2021, patient reports she has left leg edema last week.  She feels slightly better today.  Her venous Doppler study was negative for DVT in the left leg on 6/3/2021.  She will continue Eliquis 5 mg twice a day.   On 6/2/2022, patient reports she has persistent intermittent migraine headaches, and vision loss is.  She continues on Eliquis with good compliance.  I agree her symptoms most likely related to ongoing thrombosis.  I discussed with the patient, recommended to switch anticoagulation to Coumadin/warfarin.  We will monitor INR weekly.  This patient and his lifelong anticoagulation.  We will try to get insurance permission for INR meter so she could measure herself at home.    Patient reports that she  continues on Eliquis anticoagulation.  Her insurance company will not cover the INR meter until she would come to the clinic for monitoring INR for more than 6 months.  Patient reports she now uses wheelchair, and is not easy for her to come to the clinic frequently.  So she opted to continue Eliquis anticoagulation.   The patient reports on 03/02/2023 that she has been tolerating Eliquis anticoagulation. No recurrent stroke symptoms. She does have some weakness at the times not predictable and recently had one episode of falling due to weakness on the left side. No recurrence recently.  08/17/2023  She recently had episode of left upper forearm distended vein, was painful, but it had resolved without problem. She reports compliant with Eliquis every 12 hours typically on the 9 AM and at 9 PM. The patient was asking whether she could have benefited from Plavix. She also asked whether she should be treated with Rituxan since it is autoimmune disease for APS. Discussed with the patient, that we could potentially try Plavix 75 mg daily. We will hold Rituxan for now until she has thorough evaluation by rheumatologist for possible Sjogren's disease.  2/29/2024: Patient decided not to start Plavix.  She does continue on Eliquis 5 mg twice daily, does report being compliant with this.  Tolerating Eliquis without any signs or symptoms of bleeding.       2.  Iron deficiency anemia, secondary to heavy menses.    Intolerant to oral iron with significant constipation  We treated her with 2 doses of intravenous iron with Injectofar in end of September and early October 2018.   She had IV iron in late June and early July 2019 because recurrent iron deficiency.    She reports heavy menses.  She did discuss with GYN who recommended ablation, nonhormonal IUD or hysterectomy.  Recurrent iron deficiency anemia with Hb 11.5, ferritin 16.9 and iron saturation 10% on 10/7/2020.  Patient was given Injectafer 1 dose in October  2020.  Patient reports in December 2020 she had a heavy menses, one-time it lasted for 18 days despite holding anticoagulation.  Labs 12/29/2020 with recurrent iron deficiency, ferritin of 42, iron saturation of 9%, hemoglobin 10.6.  No IV iron infusion.  Persistent worsening iron deficiency with ferritin 29, iron saturation 8% on 1/29/2021.  Patient had Injectafer 2 doses in February 2021  Laboratory studies on 4/23/2021 reported much improved iron study with ferritin 400.7 ng/mL, iron saturation 24%, and improved hemoglobin 13.0.  On 6/3/2021, patient maintains normal hemoglobin 13.0.  Iron study reported excellent ferritin 375.8 ng/mL and iron saturation 31% with free iron 82 TIBC 262.   Laboratory study 5/25/2022 reported significant recurrent iron deficiency anemia with ferritin 14.1, iron saturation 6% free iron 24 and TIBC 374. We will request Venofer 300 mg weekly for 3 doses.   Today on 9/1/2022 patient is improved ferritin 90.5 ng/mL, however still low iron saturation 14% free iron 43 TIBC 301.  She also has persistent mild anemia with Hb 11.7.  I recommended patient to be given IV iron for 300 mg for 3 doses.  The patient did not receive intravenous iron therapy in late part of 2022 due to loss of insurance coverage. Today on 03/02/2023, patient reports she has been taking oral iron once a day, but it causes constipation. She has worsening hemoglobin 10.6. Iron study report significantly worsened iron condition with ferritin 6.9 ng/mL, iron saturation only 6 percent with a free iron 24, TIBC 419. I recommended to give her intravenous iron therapy, Venofer 300 mg weekly for three doses and monitoring iron studies every 3 months.  The patient received the three doses of Venofer in 03/2023. The patient reports she typically has some reaction to the second day, with tingling, pain sensation in the left lower chest wall. She also has some skin itching, but no other symptoms. The patient reports she could not  tolerate Benadryl and Pepcid, both of them made or made her tachycardic. She subsequently did well with the reaction and resolved without any problem.   08/17/2023, iron studies showed deteriorating ferritin 38.2, however, normal iron saturation 22% with a free iron 78. She is not a candidate for IV iron therapy, however, expecting to happen in probably 4 weeks since significant dropping of ferritin level.  Also because the skin rashes hives after the Venofer infusion, and also chest tightness, she is very concerned for receiving Venofer in the future.   October 2023 received 2 doses of Injectafer.  Experienced mild flulike symptoms following first dose of Injectafer.  She was prescribed Medrol Dosepak and symptoms resolved after 1 dose of steroid.  The patient later reports that she experienced swelling in the bilateral arms/legs and her face following Injectafer in October 2023.  Swelling resolved on its own, however lasted several weeks.  2/29/2024: Hemoglobin 12.0, iron saturation 17, ferritin 127.  Currently no need for IV iron replacement.      3.  B12 deficiency.    This patient has history of B12 deficiency and was given B12 injection previously.    She continues on oral B12 at 1000 mcg daily with B12 level at 916 pg/mL on 12/29/2020.   Continue oral vitamin B12.     4.  Progressive neurologic symptoms  The patient reports a decline over the past 2 months with unexpected weight loss, lower extremity weakness, development of a stutter  She is following up with neurology related to her multiple sclerosis, who does not feel her symptoms are related to her MS as her most recent MRI was fairly stable  Patient question symptomatology related to antiphospholipid syndrome or other autoimmune etiology, she is requesting referral to rheumatology at this time.  She reports her neurologist is also recommended referral to rheumatology.  Patient was seen by rheumatologist and started on Plaquenil however was not able to  tolerate it due to prolongation of QT interval.  Plaquenil was subsequently discontinued.   Rheumatologist recommended further evaluation at the [facility].  However her insurance company declined coverage.  Stable.    5.  Significant chronic fatigue.  On 6/3/2021, patient reports she is worsening chronic fatigue, very significant, she has no energy at all, lies on bed or sitting in couch most the time.    Patient reports she is not depressed at all.  I recommended to obtain comprehensive thyroid profile for assessment.  Patient had a complete normal thyroid profile on 6/3/2021.   On 6/2/2022 patient reports profound fatigue.  This is apparently related to her iron deficiency.  We will treat her with intravenous Venofer.  On 03/02/2023, patient reports worsening fatigue. This is likely due to iron deficiency.  The patient today reports chronical fatigue.    6.  Peripheral cyanosis.  6/3/2021, patient reports her fingers and toes will return to blue color oftentimes despite of taking Eliquis for anticoagulation.  She reports no claudication.    By examination, her finger feels cool to touch.  No signs of ischemia.  I recommended laboratory studies for cold agglutinin and cryoglobulin for assessment.  Laboratory study on 6/3/2021 reported negative results for both cold agglutinin and cryoglobulin.   Symptoms also likely related to hypercoagulable condition.  No changes of her condition as of 9/1/2022.  On 03/02/2023, patient reports persistent cyanosis associated with her toes.  Dry eye syndrome is reported on 08/17/2023. Discussed with the patient, we will refer her to rheumatologist for evaluation. She was previously seen at [facility], however, she is not happy with the service they provided.      7. The patient is having thickened endometrium and is waiting for D and C on 08/29/2023.   The patient asking for preop management of anticoagulation. I recommended to hold the Eliquis 48 hours prior to the  procedure. However, due to her history, I recommended bridging with the weight-based Lovenox 1 mg/kg every 12 hours for two doses and then she stop any anticoagulation the day before procedure. Postop, the patient needed to double check with Dr. Pierce to see when to start back on anticoagulation. I do recommend she start back on Lovenox first for a couple of days and if there is no evidence for bleeding, then she can resume Eliquis anticoagulation.  2/29/2024: Patient reports she canceled appointment for D&C on 8/29/2023.  She has not yet rescheduled her appointment.  She admits she has been putting off rescheduling this appointment.  She understands when appointment is rescheduled, she should notify our office so we can discuss recommendations for her anticoagulation.      PLAN:    Currently no need for IV iron replacement.  Patient decided not to start Plavix.  Continue Eliquis 5 mg twice a day.   Continue oral vitamin B12 daily.   3-month CBC, ferritin, iron profile and RN review.   Return in 6 months for CBC, ferritin, iron profile with MD follow-up.  Patient has canceled her D&C that was scheduled 8/2023.  She understands to call our office for instructions in regards to her anticoagulation whenever she does get this rescheduled.  Whenever patient does require iron replacement: she experienced hives with Venofer, so we do not give any further Venofer.  She received Injectafer and did experience mild flulike symptoms that resolved with 1 dose of her Medrol dose pack, however later experienced swelling to the bilateral arms/legs and face that lasted several weeks but resolved on its own.  Further plans for IV iron replacement including which formulation to use will need to be discussed with Dr. Burgos whenever the patient is next in need of iron replacement.        Alison Stern, APRN   02/29/2024          CC:   Deborah Riggins M.D.    Melchor Roque M.D.   Marii Pierce M.D.

## 2024-02-29 ENCOUNTER — OFFICE VISIT (OUTPATIENT)
Dept: ONCOLOGY | Facility: CLINIC | Age: 44
End: 2024-02-29
Payer: COMMERCIAL

## 2024-02-29 ENCOUNTER — LAB (OUTPATIENT)
Dept: OTHER | Facility: HOSPITAL | Age: 44
End: 2024-02-29
Payer: COMMERCIAL

## 2024-02-29 VITALS
HEIGHT: 64 IN | BODY MASS INDEX: 27.83 KG/M2 | SYSTOLIC BLOOD PRESSURE: 111 MMHG | TEMPERATURE: 97.3 F | OXYGEN SATURATION: 99 % | WEIGHT: 163 LBS | RESPIRATION RATE: 16 BRPM | DIASTOLIC BLOOD PRESSURE: 74 MMHG | HEART RATE: 83 BPM

## 2024-02-29 DIAGNOSIS — T45.4X5A ADVERSE EFFECT OF IRON, INITIAL ENCOUNTER: ICD-10-CM

## 2024-02-29 DIAGNOSIS — D50.0 IRON DEFICIENCY ANEMIA DUE TO CHRONIC BLOOD LOSS: ICD-10-CM

## 2024-02-29 DIAGNOSIS — D50.9 IRON DEFICIENCY ANEMIA, UNSPECIFIED IRON DEFICIENCY ANEMIA TYPE: Primary | ICD-10-CM

## 2024-02-29 DIAGNOSIS — D68.59 HYPERCOAGULATION SYNDROME: ICD-10-CM

## 2024-02-29 LAB
BASOPHILS # BLD AUTO: 0.03 10*3/MM3 (ref 0–0.2)
BASOPHILS NFR BLD AUTO: 0.4 % (ref 0–1.5)
DEPRECATED RDW RBC AUTO: 38.8 FL (ref 37–54)
EOSINOPHIL # BLD AUTO: 0.11 10*3/MM3 (ref 0–0.4)
EOSINOPHIL NFR BLD AUTO: 1.4 % (ref 0.3–6.2)
ERYTHROCYTE [DISTWIDTH] IN BLOOD BY AUTOMATED COUNT: 12 % (ref 12.3–15.4)
FERRITIN SERPL-MCNC: 127 NG/ML (ref 13–150)
HCT VFR BLD AUTO: 37.2 % (ref 34–46.6)
HGB BLD-MCNC: 12 G/DL (ref 12–15.9)
IMM GRANULOCYTES # BLD AUTO: 0 10*3/MM3 (ref 0–0.05)
IMM GRANULOCYTES NFR BLD AUTO: 0 % (ref 0–0.5)
IRON 24H UR-MRATE: 52 MCG/DL (ref 37–145)
IRON SATN MFR SERPL: 17 % (ref 20–50)
LYMPHOCYTES # BLD AUTO: 2.39 10*3/MM3 (ref 0.7–3.1)
LYMPHOCYTES NFR BLD AUTO: 30.6 % (ref 19.6–45.3)
MCH RBC QN AUTO: 28.4 PG (ref 26.6–33)
MCHC RBC AUTO-ENTMCNC: 32.3 G/DL (ref 31.5–35.7)
MCV RBC AUTO: 88.2 FL (ref 79–97)
MONOCYTES # BLD AUTO: 0.44 10*3/MM3 (ref 0.1–0.9)
MONOCYTES NFR BLD AUTO: 5.6 % (ref 5–12)
NEUTROPHILS NFR BLD AUTO: 4.85 10*3/MM3 (ref 1.7–7)
NEUTROPHILS NFR BLD AUTO: 62 % (ref 42.7–76)
NRBC BLD AUTO-RTO: 0 /100 WBC (ref 0–0.2)
PLATELET # BLD AUTO: 320 10*3/MM3 (ref 140–450)
PMV BLD AUTO: 8.9 FL (ref 6–12)
RBC # BLD AUTO: 4.22 10*6/MM3 (ref 3.77–5.28)
TIBC SERPL-MCNC: 299 MCG/DL (ref 298–536)
TRANSFERRIN SERPL-MCNC: 201 MG/DL (ref 200–360)
WBC NRBC COR # BLD AUTO: 7.82 10*3/MM3 (ref 3.4–10.8)

## 2024-02-29 PROCEDURE — 83540 ASSAY OF IRON: CPT | Performed by: INTERNAL MEDICINE

## 2024-02-29 PROCEDURE — 36415 COLL VENOUS BLD VENIPUNCTURE: CPT

## 2024-02-29 PROCEDURE — 85025 COMPLETE CBC W/AUTO DIFF WBC: CPT | Performed by: INTERNAL MEDICINE

## 2024-02-29 PROCEDURE — 84466 ASSAY OF TRANSFERRIN: CPT | Performed by: INTERNAL MEDICINE

## 2024-02-29 PROCEDURE — 82728 ASSAY OF FERRITIN: CPT | Performed by: INTERNAL MEDICINE

## 2024-03-01 RX ORDER — APIXABAN 5 MG/1
TABLET, FILM COATED ORAL
Qty: 180 TABLET | Refills: 0 | Status: SHIPPED | OUTPATIENT
Start: 2024-03-01

## 2024-05-17 RX ORDER — APIXABAN 5 MG/1
TABLET, FILM COATED ORAL
Qty: 180 TABLET | Refills: 0 | Status: SHIPPED | OUTPATIENT
Start: 2024-05-17

## 2024-05-23 ENCOUNTER — LAB (OUTPATIENT)
Dept: OTHER | Facility: HOSPITAL | Age: 44
End: 2024-05-23
Payer: COMMERCIAL

## 2024-05-23 ENCOUNTER — CLINICAL SUPPORT (OUTPATIENT)
Dept: ONCOLOGY | Facility: HOSPITAL | Age: 44
End: 2024-05-23
Payer: COMMERCIAL

## 2024-05-23 DIAGNOSIS — D50.9 IRON DEFICIENCY ANEMIA, UNSPECIFIED IRON DEFICIENCY ANEMIA TYPE: ICD-10-CM

## 2024-05-23 DIAGNOSIS — D68.59 HYPERCOAGULATION SYNDROME: ICD-10-CM

## 2024-05-23 LAB
BASOPHILS # BLD AUTO: 0.03 10*3/MM3 (ref 0–0.2)
BASOPHILS NFR BLD AUTO: 0.4 % (ref 0–1.5)
DEPRECATED RDW RBC AUTO: 38.1 FL (ref 37–54)
EOSINOPHIL # BLD AUTO: 0.1 10*3/MM3 (ref 0–0.4)
EOSINOPHIL NFR BLD AUTO: 1.2 % (ref 0.3–6.2)
ERYTHROCYTE [DISTWIDTH] IN BLOOD BY AUTOMATED COUNT: 12.1 % (ref 12.3–15.4)
FERRITIN SERPL-MCNC: 24.6 NG/ML (ref 13–150)
HCT VFR BLD AUTO: 33.5 % (ref 34–46.6)
HGB BLD-MCNC: 10.8 G/DL (ref 12–15.9)
IMM GRANULOCYTES # BLD AUTO: 0.03 10*3/MM3 (ref 0–0.05)
IMM GRANULOCYTES NFR BLD AUTO: 0.4 % (ref 0–0.5)
IRON 24H UR-MRATE: 95 MCG/DL (ref 37–145)
IRON SATN MFR SERPL: 28 % (ref 20–50)
LYMPHOCYTES # BLD AUTO: 2.13 10*3/MM3 (ref 0.7–3.1)
LYMPHOCYTES NFR BLD AUTO: 26.5 % (ref 19.6–45.3)
MCH RBC QN AUTO: 28.1 PG (ref 26.6–33)
MCHC RBC AUTO-ENTMCNC: 32.2 G/DL (ref 31.5–35.7)
MCV RBC AUTO: 87 FL (ref 79–97)
MONOCYTES # BLD AUTO: 0.32 10*3/MM3 (ref 0.1–0.9)
MONOCYTES NFR BLD AUTO: 4 % (ref 5–12)
NEUTROPHILS NFR BLD AUTO: 5.44 10*3/MM3 (ref 1.7–7)
NEUTROPHILS NFR BLD AUTO: 67.5 % (ref 42.7–76)
NRBC BLD AUTO-RTO: 0 /100 WBC (ref 0–0.2)
PLATELET # BLD AUTO: 320 10*3/MM3 (ref 140–450)
PMV BLD AUTO: 8.6 FL (ref 6–12)
RBC # BLD AUTO: 3.85 10*6/MM3 (ref 3.77–5.28)
TIBC SERPL-MCNC: 337 MCG/DL (ref 298–536)
TRANSFERRIN SERPL-MCNC: 226 MG/DL (ref 200–360)
WBC NRBC COR # BLD AUTO: 8.05 10*3/MM3 (ref 3.4–10.8)

## 2024-05-23 PROCEDURE — 85025 COMPLETE CBC W/AUTO DIFF WBC: CPT | Performed by: INTERNAL MEDICINE

## 2024-05-23 PROCEDURE — 36415 COLL VENOUS BLD VENIPUNCTURE: CPT

## 2024-05-23 PROCEDURE — 84466 ASSAY OF TRANSFERRIN: CPT | Performed by: INTERNAL MEDICINE

## 2024-05-23 PROCEDURE — 83540 ASSAY OF IRON: CPT | Performed by: INTERNAL MEDICINE

## 2024-05-23 PROCEDURE — 82728 ASSAY OF FERRITIN: CPT | Performed by: INTERNAL MEDICINE

## 2024-05-23 NOTE — PROGRESS NOTES
Sharyn Jones is a 44 y.o. female with Iron deficiency anemia seen by Hematology/Oncology provider, Dr. Burgos, and is scheduled with Clinical Review offered by [facility]. Patient's visit was held via telephone today.     Therapy plan  Iron preparations as indicated    Allergies  Allergies   Allergen Reactions    Epinephrine Other (See Comments)     Makes her pass out    Nitroglycerin Other (See Comments)     Bradycardia      Venofer [Iron Sucrose] Hives and Rash    Codeine Rash     vomiting        Current Medication List  Medication Sig Start Date End Date Taking? Authorizing Provider   Eliquis 5 MG tablet tablet Take 1 tablet by mouth every 12 hours. 5/17/24   Johny Burgos MD PhD       Relevant Laboratory Values   Latest Reference Range & Units 05/23/24 14:22   Iron 37 - 145 mcg/dL 95   Ferritin 13.00 - 150.00 ng/mL 24.60   Iron Saturation (TSAT) 20 - 50 % 28   Transferrin 200 - 360 mg/dL 226   TIBC 298 - 536 mcg/dL 337      Latest Reference Range & Units 05/23/24 14:22   WBC 3.40 - 10.80 10*3/mm3 8.05   RBC 3.77 - 5.28 10*6/mm3 3.85   Hemoglobin 12.0 - 15.9 g/dL 10.8 (L)   Hematocrit 34.0 - 46.6 % 33.5 (L)   Platelets 140 - 450 10*3/mm3 320   RDW 12.3 - 15.4 % 12.1 (L)   MCV 79.0 - 97.0 fL 87.0   MCH 26.6 - 33.0 pg 28.1   MCHC 31.5 - 35.7 g/dL 32.2   MPV 6.0 - 12.0 fL 8.6   RDW-SD 37.0 - 54.0 fl 38.1   Neutrophil Rel % 42.7 - 76.0 % 67.5   Lymphocyte Rel % 19.6 - 45.3 % 26.5   Monocyte Rel % 5.0 - 12.0 % 4.0 (L)   Eosinophil Rel % 0.3 - 6.2 % 1.2   Basophil Rel % 0.0 - 1.5 % 0.4   Immature Granulocyte Rel % 0.0 - 0.5 % 0.4   Neutrophils Absolute 1.70 - 7.00 10*3/mm3 5.44   Lymphocytes Absolute 0.70 - 3.10 10*3/mm3 2.13   Monocytes Absolute 0.10 - 0.90 10*3/mm3 0.32   Eosinophils Absolute 0.00 - 0.40 10*3/mm3 0.10   Basophils Absolute 0.00 - 0.20 10*3/mm3 0.03   Immature Grans, Absolute 0.00 - 0.05 10*3/mm3 0.03   nRBC 0.0 - 0.2 /100 WBC 0.0       Clinical Review  Patient reports  adherence to Eliquis 5 mg BID. Patient also reports adherence to oral iron.     Patient reports her menstrual cycle has lasted longer and been heavier than most. Patient states her cycle has lasted ~2 weeks. Patient states her OB/GYN is aware of her abnormal uterine bleeding and thickened endometrium, and plans on calling OB/GYN to set up an appointment for evaluation of current menstruation symptoms.     Hemoglobin 10.8  Platelets 320,000    The patient's current therapy plan and above labs have been reviewed.     Plan  Iron studies and CBC reviewed. - see above in Laboratory Results  Will instruct Sharyn Jones to continue as previously instructed and follow up with OB/GYN as discussed.  Follow up in 3 months when scheduled with Dr. Burgos. Next scheduled appointment(s) reviewed with patient.  Patient is instructed to call the office with any concerns or new symptoms prior to next visit.  Verbal information provided. Patient expresses understanding and has no further questions at this time.            Kinga Weaver, PharmD  Clinical Pharmacy Services   [facility]  5/23/2024  15:09 EDT

## 2024-05-28 ENCOUNTER — TELEPHONE (OUTPATIENT)
Dept: ONCOLOGY | Facility: CLINIC | Age: 44
End: 2024-05-28
Payer: COMMERCIAL

## 2024-05-28 RX ORDER — PROCHLORPERAZINE MALEATE 10 MG
10 TABLET ORAL ONCE
Status: CANCELLED | OUTPATIENT
Start: 2024-06-04 | End: 2024-06-04

## 2024-05-28 RX ORDER — SODIUM CHLORIDE 9 MG/ML
20 INJECTION, SOLUTION INTRAVENOUS ONCE
Status: CANCELLED | OUTPATIENT
Start: 2024-06-04

## 2024-05-28 RX ORDER — SODIUM CHLORIDE 9 MG/ML
20 INJECTION, SOLUTION INTRAVENOUS ONCE
Status: CANCELLED | OUTPATIENT
Start: 2024-05-28

## 2024-05-28 RX ORDER — PROCHLORPERAZINE MALEATE 10 MG
10 TABLET ORAL ONCE
Status: CANCELLED | OUTPATIENT
Start: 2024-05-28 | End: 2024-05-28

## 2024-05-28 NOTE — TELEPHONE ENCOUNTER
Attempted to call no answer left message to return call.    Per Dr Burgos patient is iron deficient and he is ordering IV Iron for patient.    Lab Results   Component Value Date    IRON 95 05/23/2024    TIBC 337 05/23/2024    FERRITIN 24.60 05/23/2024        IRON SAT                                                    28.0                                  05/23/2024

## 2024-05-30 ENCOUNTER — TELEPHONE (OUTPATIENT)
Dept: ONCOLOGY | Facility: CLINIC | Age: 44
End: 2024-05-30
Payer: COMMERCIAL

## 2024-05-30 NOTE — TELEPHONE ENCOUNTER
Caller: Sharyn Jones    Relationship: Self    Best call back number: [phone]    What is the best time to reach you: ANYTIME    Who are you requesting to speak with (clinical staff, provider,  specific staff member): CLINICAL    What was the call regarding: PT IS QUESTIONING WHY SHE WAS SET UP FOR INFUSIONS IF HER NUMBERS WERE GOOD.  TRANSFERRED PT OVER TO CENTRAL SCHEDULING TO CANCEL [facility] INFUSIONS DUE TO HER BEING OUT OF TOWN.    PLEASE CALL TO ADVISE IF FUTURE TREATMENTS ARE NEEDED.

## 2024-05-30 NOTE — TELEPHONE ENCOUNTER
Patient calling stated she does not want IV iron at this time. Patient is going out of town and will call office on returning to town to have labs rechecked. Message sent to appointment desk to cancel.

## 2024-08-01 RX ORDER — APIXABAN 5 MG/1
TABLET, FILM COATED ORAL
Qty: 180 TABLET | Refills: 3 | Status: SHIPPED | OUTPATIENT
Start: 2024-08-01